# Patient Record
Sex: MALE | Race: WHITE | NOT HISPANIC OR LATINO | Employment: FULL TIME | URBAN - METROPOLITAN AREA
[De-identification: names, ages, dates, MRNs, and addresses within clinical notes are randomized per-mention and may not be internally consistent; named-entity substitution may affect disease eponyms.]

---

## 2017-07-26 RX ORDER — MESALAMINE 375 MG/1
CAPSULE, EXTENDED RELEASE ORAL
Qty: 120 CAPSULE | Refills: 10 | OUTPATIENT
Start: 2017-07-26

## 2017-08-16 ENCOUNTER — OFFICE VISIT (OUTPATIENT)
Dept: GASTROENTEROLOGY | Facility: CLINIC | Age: 42
End: 2017-08-16

## 2017-08-16 VITALS
BODY MASS INDEX: 33.18 KG/M2 | SYSTOLIC BLOOD PRESSURE: 130 MMHG | HEIGHT: 70 IN | WEIGHT: 231.8 LBS | DIASTOLIC BLOOD PRESSURE: 76 MMHG

## 2017-08-16 DIAGNOSIS — K51.90 ULCERATIVE COLITIS WITHOUT COMPLICATIONS, UNSPECIFIED LOCATION (HCC): Primary | ICD-10-CM

## 2017-08-16 PROCEDURE — 99214 OFFICE O/P EST MOD 30 MIN: CPT | Performed by: INTERNAL MEDICINE

## 2017-08-16 RX ORDER — MESALAMINE 0.38 G/1
1.5 CAPSULE, EXTENDED RELEASE ORAL DAILY
Qty: 120 CAPSULE | Refills: 11 | Status: ON HOLD | OUTPATIENT
Start: 2017-08-16 | End: 2017-10-11

## 2017-08-16 NOTE — PROGRESS NOTES
PATIENT INFORMATION  Arun SINGH [place]       - 1975    CHIEF COMPLAINT  Chief Complaint   Patient presents with   • Ulcerative Colitis       HISTORY OF PRESENT ILLNESS  Ulcerative Colitis     43 yo diagnosed with UC for 13 years maintained on Apriso 2 daily.  BM are 2-3 times daily. No blood in the stool. Weight has been stable. He has been off Apriso for 3 weeks because his previous GI office had ?troubles.  Last CLS was in . He was scheduled for a cls last year but did not get bowel prep.      REVIEW OF SYSTEMS  Review of Systems   All other systems reviewed and are negative.        ACTIVE PROBLEMS  There are no active problems to display for this patient.        PAST MEDICAL HISTORY  Past Medical History:   Diagnosis Date   • Esophagitis    • Gastritis    • GERD (gastroesophageal reflux disease)    • Hiatal hernia    • Hypertension    • Ulcerative colitis          SURGICAL HISTORY  Past Surgical History:   Procedure Laterality Date   • COLONOSCOPY  2013    stool, mild inflammation, mild-moderate chronic colitis w/crypt abscess    • UPPER GASTROINTESTINAL ENDOSCOPY  2013    z line irregular, 38 cm from incisors, HH, gastritis, active esophagitis w/changes suggestive of an adjacent erosion, no h-pylorino Curran's         FAMILY HISTORY  History reviewed. No pertinent family history.      SOCIAL HISTORY  Social History     Occupational History   • Not on file.     Social History Main Topics   • Smoking status: Never Smoker   • Smokeless tobacco: Not on file   • Alcohol use 3.6 oz/week     6 Cans of beer per week      Comment: weekly   • Drug use: No   • Sexual activity: Not on file         CURRENT MEDICATIONS    Current Outpatient Prescriptions:   •  amLODIPine (NORVASC) 5 MG tablet, Take 5 mg by mouth daily., Disp: , Rfl:   •  hydrocortisone (ANUSOL-HC) 2.5 % rectal cream, Insert  into the rectum 2 (two) times a day., Disp: 30 g, Rfl: 5  •  mesalamine (APRISO) 0.375 g 24 hr capsule, Take  "4 capsules by mouth Daily for 120 days., Disp: 120 capsule, Rfl: 11  •  omeprazole (PriLOSEC) 40 MG capsule, Take 40 mg by mouth daily., Disp: , Rfl:     ALLERGIES  Asacol [mesalamine]    VITALS  Vitals:    08/16/17 1518   BP: 130/76   Weight: 231 lb 12.8 oz (105 kg)   Height: 70\" (177.8 cm)       LAST RESULTS   Admission on 07/27/2015, Discharged on 07/27/2015   Component Date Value Ref Range Status   • WBC 07/27/2015 9.99  4.80 - 10.80 K/Cumm Final   • RBC 07/27/2015 5.45  4.70 - 6.10 Million Final   • Hemoglobin 07/27/2015 16.1  14.0 - 18.0 g/dL Final   • Hematocrit 07/27/2015 45.6  42.0 - 52.0 % Final   • MCV 07/27/2015 83.7  80.0 - 94.0 fL Final   • MCH 07/27/2015 29.5  27.0 - 31.0 pg Final   • MCHC 07/27/2015 35.3  31.0 - 37.0 g/dL Final   • RDW-CV 07/27/2015 12.3  11.5 - 14.5 % Final   • Platelets 07/27/2015 283  140 - 500 K/Cumm Final   • MPV 07/27/2015 10.5* 7.4 - 10.4 fL Final   • Neutrophil Rel % 07/27/2015 68  45 - 70 % Final   • Lymphocyte Rel % 07/27/2015 17* 20 - 45 % Final   • Monocyte Rel % 07/27/2015 14* 3 - 8 % Final   • Eosinophil Rel % 07/27/2015 1  0 - 4 % Final   • Basophil Rel % 07/27/2015 1  0 - 2 % Final   • Neutrophils Absolute 07/27/2015 6.77  1.50 - 8.30 K/Cumm Final   • Lymphocytes Absolute 07/27/2015 1.67  0.60 - 4.80 K/Cumm Final   • Monocytes Absolute 07/27/2015 1.44* 0.00 - 1.00 K/Cumm Final   • Eosinophils Absolute 07/27/2015 0.05* 0.10 - 0.30 K/Cumm Final   • Basophils Absolute 07/27/2015 0.06  0.00 - 0.20 K/Cumm Final   • Glucose 07/27/2015 102* 65 - 99 mg/dL Final   • BUN 07/27/2015 15  6 - 20 mg/dL Final   • Creatinine 07/27/2015 1.40* 0.76 - 1.27 mg/dL Final   • Sodium 07/27/2015 133* 136 - 145 mmol/L Final   • Potassium 07/27/2015 3.5  3.5 - 5.2 mmol/L Final   • Chloride 07/27/2015 96* 98 - 107 mmol/L Final   • CO2 07/27/2015 18* 22 - 29 mmol/L Final   • Total Protein 07/27/2015 8.9* 6.0 - 8.5 g/dL Final   • Albumin 07/27/2015 4.8  3.5 - 5.2 g/dL Final   • AST (SGOT) " 07/27/2015 28  5 - 40 U/L Final   • Calcium 07/27/2015 8.9  8.6 - 10.5 mg/dL Final   • Total Bilirubin 07/27/2015 0.5  0.1 - 1.2 mg/dL Final   • Alkaline Phosphatase 07/27/2015 93  40 - 129 U/L Final   • ALT (SGPT) 07/27/2015 34  5 - 41 U/L Final   • eGFR 07/27/2015 56  ml/min/1.732 Final    Comment: DF by IF @ 07/27/2015 15:30  GFR Normal                            >60  Chronic Kidney Disease          <60  Kidney Failure                         <15     • Lipase 07/27/2015 17.00  13.00 - 60.00 Units/L Final   • Amylase 07/27/2015 27* 28 - 100 Units/L Final   • Color, UA 07/27/2015 Yellow  YELLOW Final   • Appearance, UA 07/27/2015 Sl Hazy* CLEAR Final   • Glucose, UA 07/27/2015 Negative  NEGATIVE mg/dL Final   • Bilirubin, UA 07/27/2015 Negative  NEGATIVE Final   • Ketones, UA 07/27/2015 Negative  NEGATIVE mg/dL Final   • Specific Gravity, UA 07/27/2015 1.025  1.003 - 1.030 Final   • pH, UA 07/27/2015 6.0  4.5 - 8.0 Final   • Protein, UA 07/27/2015 30* NEGATIVE,TRACE mg/dL Final   • Urobilinogen, UA 07/27/2015 0.2  0.2 - 1.0 E.U./dL Final   • Nitrite, UA 07/27/2015 Negative  NEGATIVE Final   • Blood, UA 07/27/2015 Small* NEGATIVE Final   • Leukocytes, UA 07/27/2015 Negative  NEGATIVE Final   • WBC, UA 07/27/2015 13-20* NONE SEEN,0-2,3-6 /hpf Final   • RBC, UA 07/27/2015 7-12* NONE SEEN,0-2,3-6 /hpf Final   • Epithelial Cells, UA 07/27/2015 0-2  NONE SEEN,0-2,3-6 /hpf Final   • Bacteria, UA 07/27/2015 1+* NONE SEEN /hpf Final   • Mucus, UA 07/27/2015 Moderate/2+* NONE SEEN /hpf Final   • Casts 07/27/2015 3-6   Final     No results found.    PHYSICAL EXAM  Physical Exam   Constitutional: He is oriented to person, place, and time. He appears well-developed and well-nourished. No distress.   HENT:   Head: Normocephalic and atraumatic.   Eyes: EOM are normal. Pupils are equal, round, and reactive to light.   Neck: Neck supple. No tracheal deviation present.   Cardiovascular: Normal rate, regular rhythm, normal heart  sounds and intact distal pulses.  Exam reveals no gallop and no friction rub.    No murmur heard.  Pulmonary/Chest: Effort normal and breath sounds normal. No respiratory distress. He has no wheezes. He has no rales. He exhibits no tenderness.   Abdominal: Soft. Bowel sounds are normal. He exhibits no distension. There is no tenderness. There is no rebound and no guarding.   Musculoskeletal: He exhibits no edema.   Lymphadenopathy:     He has no cervical adenopathy.   Neurological: He is alert and oriented to person, place, and time.   Skin: Skin is warm. He is not diaphoretic. No erythema.   Psychiatric: He has a normal mood and affect. His behavior is normal. Judgment and thought content normal.   Nursing note and vitals reviewed.      ASSESSMENT  Diagnoses and all orders for this visit:    Ulcerative colitis without complications, unspecified location  -     Case Request; Standing  -     Case Request    Other orders  -     Implement Anesthesia orders day of procedure.; Standing  -     Obtain informed consent; Standing  -     mesalamine (APRISO) 0.375 g 24 hr capsule; Take 4 capsules by mouth Daily for 120 days.          PLAN  No Follow-up on file.      Risks, benefits and alternatives discussed including but not limited to the complications of bleeding, perforation and sedation related problems.      Needs routine 1-2 year cls, discussed this with him. Risk of CRC discussed.

## 2017-10-10 ENCOUNTER — ANESTHESIA EVENT (OUTPATIENT)
Dept: PERIOP | Facility: HOSPITAL | Age: 42
End: 2017-10-10

## 2017-10-11 ENCOUNTER — ANESTHESIA (OUTPATIENT)
Dept: PERIOP | Facility: HOSPITAL | Age: 42
End: 2017-10-11

## 2017-10-11 ENCOUNTER — HOSPITAL ENCOUNTER (OUTPATIENT)
Facility: HOSPITAL | Age: 42
Setting detail: HOSPITAL OUTPATIENT SURGERY
Discharge: HOME | End: 2017-10-11
Attending: INTERNAL MEDICINE | Admitting: INTERNAL MEDICINE

## 2017-10-11 VITALS
HEART RATE: 60 BPM | DIASTOLIC BLOOD PRESSURE: 82 MMHG | WEIGHT: 222.4 LBS | RESPIRATION RATE: 14 BRPM | BODY MASS INDEX: 31.91 KG/M2 | OXYGEN SATURATION: 99 % | TEMPERATURE: 98.8 F | SYSTOLIC BLOOD PRESSURE: 120 MMHG

## 2017-10-11 DIAGNOSIS — K51.90 ULCERATIVE COLITIS WITHOUT COMPLICATIONS, UNSPECIFIED LOCATION (HCC): ICD-10-CM

## 2017-10-11 PROCEDURE — 25010000002 PROPOFOL 10 MG/ML EMULSION: Performed by: NURSE ANESTHETIST, CERTIFIED REGISTERED

## 2017-10-11 PROCEDURE — 45380 COLONOSCOPY AND BIOPSY: CPT | Performed by: INTERNAL MEDICINE

## 2017-10-11 RX ORDER — PROPOFOL 10 MG/ML
VIAL (ML) INTRAVENOUS AS NEEDED
Status: DISCONTINUED | OUTPATIENT
Start: 2017-10-11 | End: 2017-10-11 | Stop reason: SURG

## 2017-10-11 RX ORDER — LIDOCAINE HYDROCHLORIDE 20 MG/ML
INJECTION, SOLUTION INFILTRATION; PERINEURAL AS NEEDED
Status: DISCONTINUED | OUTPATIENT
Start: 2017-10-11 | End: 2017-10-11 | Stop reason: SURG

## 2017-10-11 RX ORDER — SODIUM CHLORIDE 9 MG/ML
40 INJECTION, SOLUTION INTRAVENOUS AS NEEDED
Status: DISCONTINUED | OUTPATIENT
Start: 2017-10-11 | End: 2017-10-11 | Stop reason: HOSPADM

## 2017-10-11 RX ORDER — MESALAMINE 0.38 G/1
375 CAPSULE, EXTENDED RELEASE ORAL 4 TIMES DAILY
Qty: 120 CAPSULE | Refills: 6 | Status: SHIPPED | OUTPATIENT
Start: 2017-10-11 | End: 2018-10-04 | Stop reason: SDUPTHER

## 2017-10-11 RX ORDER — SODIUM CHLORIDE 0.9 % (FLUSH) 0.9 %
1-10 SYRINGE (ML) INJECTION AS NEEDED
Status: DISCONTINUED | OUTPATIENT
Start: 2017-10-11 | End: 2017-10-11 | Stop reason: HOSPADM

## 2017-10-11 RX ORDER — LIDOCAINE HYDROCHLORIDE 10 MG/ML
INJECTION, SOLUTION EPIDURAL; INFILTRATION; INTRACAUDAL; PERINEURAL
Status: COMPLETED
Start: 2017-10-11 | End: 2017-10-11

## 2017-10-11 RX ORDER — SODIUM CHLORIDE, SODIUM LACTATE, POTASSIUM CHLORIDE, CALCIUM CHLORIDE 600; 310; 30; 20 MG/100ML; MG/100ML; MG/100ML; MG/100ML
9 INJECTION, SOLUTION INTRAVENOUS CONTINUOUS
Status: DISCONTINUED | OUTPATIENT
Start: 2017-10-11 | End: 2017-10-11 | Stop reason: HOSPADM

## 2017-10-11 RX ORDER — LIDOCAINE HYDROCHLORIDE 10 MG/ML
0.5 INJECTION, SOLUTION EPIDURAL; INFILTRATION; INTRACAUDAL; PERINEURAL ONCE AS NEEDED
Status: COMPLETED | OUTPATIENT
Start: 2017-10-11 | End: 2017-10-11

## 2017-10-11 RX ORDER — MAGNESIUM HYDROXIDE 1200 MG/15ML
LIQUID ORAL AS NEEDED
Status: DISCONTINUED | OUTPATIENT
Start: 2017-10-11 | End: 2017-10-11 | Stop reason: HOSPADM

## 2017-10-11 RX ADMIN — PROPOFOL 50 MG: 10 INJECTION, EMULSION INTRAVENOUS at 10:56

## 2017-10-11 RX ADMIN — PROPOFOL 100 MG: 10 INJECTION, EMULSION INTRAVENOUS at 10:33

## 2017-10-11 RX ADMIN — LIDOCAINE HYDROCHLORIDE 100 MG: 20 INJECTION, SOLUTION INFILTRATION; PERINEURAL at 10:30

## 2017-10-11 RX ADMIN — PROPOFOL 50 MG: 10 INJECTION, EMULSION INTRAVENOUS at 10:41

## 2017-10-11 RX ADMIN — PROPOFOL 50 MG: 10 INJECTION, EMULSION INTRAVENOUS at 11:04

## 2017-10-11 RX ADMIN — PROPOFOL 50 MG: 10 INJECTION, EMULSION INTRAVENOUS at 10:35

## 2017-10-11 RX ADMIN — PROPOFOL 100 MG: 10 INJECTION, EMULSION INTRAVENOUS at 10:37

## 2017-10-11 RX ADMIN — PROPOFOL 50 MG: 10 INJECTION, EMULSION INTRAVENOUS at 10:53

## 2017-10-11 RX ADMIN — PROPOFOL 50 MG: 10 INJECTION, EMULSION INTRAVENOUS at 10:59

## 2017-10-11 RX ADMIN — LIDOCAINE HYDROCHLORIDE 0.1 ML: 10 INJECTION, SOLUTION EPIDURAL; INFILTRATION; INTRACAUDAL; PERINEURAL at 10:05

## 2017-10-11 RX ADMIN — SODIUM CHLORIDE, POTASSIUM CHLORIDE, SODIUM LACTATE AND CALCIUM CHLORIDE 9 ML/HR: 600; 310; 30; 20 INJECTION, SOLUTION INTRAVENOUS at 10:05

## 2017-10-11 RX ADMIN — PROPOFOL 50 MG: 10 INJECTION, EMULSION INTRAVENOUS at 10:45

## 2017-10-11 RX ADMIN — PROPOFOL 50 MG: 10 INJECTION, EMULSION INTRAVENOUS at 10:49

## 2017-10-11 NOTE — ANESTHESIA PREPROCEDURE EVALUATION
Anesthesia Evaluation     Patient summary reviewed   NPO Solid Status: > 8 hours  NPO Liquid Status: > 4 hours     Airway   Mallampati: II  TM distance: >3 FB  Neck ROM: full  no difficulty expected  Dental - normal exam     Pulmonary - normal exam    breath sounds clear to auscultation  (+) a smoker (occas cigar),   Cardiovascular - normal exam  Exercise tolerance: good (4-7 METS)    Rhythm: regular  Rate: normal    (+) hypertension well controlled,       Neuro/Psych- negative ROS  GI/Hepatic/Renal/Endo    (+)  GERD well controlled,     ROS Comment: H/o ulcerative colitis      Musculoskeletal (-) negative ROS    Abdominal  - normal exam   Substance History - negative use     OB/GYN negative ob/gyn ROS         Other - negative ROS                                     Anesthesia Plan    ASA 2     MAC     intravenous induction   Anesthetic plan and risks discussed with patient.

## 2017-10-11 NOTE — OP NOTE
Colonoscopy Note:    Indication: Long-standing history for ulcerative colitis, surveillance colonoscopy      Consent: Procedure colonoscopy was explained to the patient and detail including but not limited to the, patient of bleeding perforation and possible reactions to sedation.    Sedation: Sedation was provided by anesthesia.    Procedure:  After excellent sedation a digital rectal examinations performed and a flexible colonoscope was inserted into the rectum passed to the cecum.  The cecum was identified by both the ileocecal valve and the appendiceal orifice.  The overall bowel preparation was fair.  Terminal ileum was entered this appeared normal.  Upon withdrawal scope careful examination mucosa was made.  Terminal ileum overall appeared normal.  Random biopsies of the terminal ileum right colon transverse colon left colon and rectum were all obtained.  2 polyps were noted both were about 2-3 mm each sessile removed with forceps.  One was in the sigmoid colon and the other one was in the descending colon.  Both removed with forceps.  There is evidence of mild diffuse colitis extending from the rectum to about 45 cm.  The mucosa proximal to this grossly appeared normal.  Scope was withdrawn to the rectum and retroflex were internal hemorrhoids are noted.  Scope was then straightened and withdrawn out of the patient with no immediate complications and he tolerated the procedure well.    Impression/Plan:  Colitis mild extending up to about 45 cm.  We'll await his biopsy results.  Will increase his pre-so to 4 tablets daily.  Colon polyp removed with forceps  Repeat colon likely in 2 years but based on pathology results.

## 2017-10-11 NOTE — ANESTHESIA POSTPROCEDURE EVALUATION
Patient: Arun Painting    Procedure Summary     Date Anesthesia Start Anesthesia Stop Room / Location    10/11/17 1029 1109 [facility] / [facility]       Procedure Diagnosis Surgeon Provider    COLONOSCOPY  w/ biopsies; polypectomy (N/A ) Ulcerative colitis without complications, unspecified location  (Ulcerative colitis without complications, unspecified location [K51.90]) MD Cherrie Meyer CRNA          Anesthesia Type: MAC  Last vitals  BP   109/81 (10/11/17 1120)    Temp        Pulse   60 (10/11/17 1120)   Resp   16 (10/11/17 1120)    SpO2   99 % (10/11/17 1120)      Post Anesthesia Care and Evaluation    Patient location during evaluation: bedside  Patient participation: complete - patient participated  Level of consciousness: awake and alert  Pain score: 0  Pain management: adequate  Airway patency: patent  Anesthetic complications: No anesthetic complications    Cardiovascular status: acceptable  Respiratory status: acceptable  Hydration status: acceptable

## 2017-10-11 NOTE — PLAN OF CARE
Problem: Patient Care Overview (Adult)  Goal: Plan of Care Review  Outcome: Ongoing (interventions implemented as appropriate)    10/11/17 1015   Coping/Psychosocial Response Interventions   Plan Of Care Reviewed With patient;spouse   Patient Care Overview   Progress no change   Outcome Evaluation   Outcome Summary/Follow up Plan VSS, NO C/O, AWAITING ANESTHESIA THEN READY FOR PROCEDURE       Goal: Adult Individualization and Mutuality  Outcome: Ongoing (interventions implemented as appropriate)    Problem: GI Endoscopy (Adult)  Goal: Signs and Symptoms of Listed Potential Problems Will be Absent or Manageable (GI Endoscopy)  Outcome: Ongoing (interventions implemented as appropriate)

## 2017-10-11 NOTE — PLAN OF CARE
Problem: GI Endoscopy (Adult)  Goal: Signs and Symptoms of Listed Potential Problems Will be Absent or Manageable (GI Endoscopy)  Outcome: Ongoing (interventions implemented as appropriate)    10/11/17 1033   GI Endoscopy   Problems Assessed (GI Endoscopy) all   Problems Present (GI Endoscopy) none

## 2017-10-11 NOTE — H&P
PATIENT INFORMATION  Arun SINGH [place]         - 1975     CHIEF COMPLAINT      Chief Complaint   Patient presents with   • Ulcerative Colitis         HISTORY OF PRESENT ILLNESS  Ulcerative Colitis   43 yo diagnosed with UC for 13 years maintained on Apriso 2 daily.  BM are 2-3 times daily. No blood in the stool. Weight has been stable. He has been off Apriso for 3 weeks because his previous GI office had ?troubles.  Last CLS was in . He was scheduled for a cls last year but did not get bowel prep.        REVIEW OF SYSTEMS  Review of Systems   All other systems reviewed and are negative.           ACTIVE PROBLEMS  There are no active problems to display for this patient.           PAST MEDICAL HISTORY   Medical History         Past Medical History:   Diagnosis Date   • Esophagitis     • Gastritis     • GERD (gastroesophageal reflux disease)     • Hiatal hernia     • Hypertension     • Ulcerative colitis                 SURGICAL HISTORY   Surgical History          Past Surgical History:   Procedure Laterality Date   • COLONOSCOPY   2013     stool, mild inflammation, mild-moderate chronic colitis w/crypt abscess    • UPPER GASTROINTESTINAL ENDOSCOPY   2013     z line irregular, 38 cm from incisors, HH, gastritis, active esophagitis w/changes suggestive of an adjacent erosion, no h-pylorino Curran's               FAMILY HISTORY  History reviewed. No pertinent family history.        SOCIAL HISTORY  Social History          Occupational History   • Not on file.              Social History Main Topics    • Smoking status: Never Smoker    • Smokeless tobacco: Not on file    • Alcohol use 3.6 oz/week       6 Cans of beer per week         Comment: weekly    • Drug use: No    • Sexual activity: Not on file             CURRENT MEDICATIONS     Current Outpatient Prescriptions:   •  amLODIPine (NORVASC) 5 MG tablet, Take 5 mg by mouth daily., Disp: , Rfl:   •  hydrocortisone (ANUSOL-HC) 2.5 % rectal cream,  "Insert  into the rectum 2 (two) times a day., Disp: 30 g, Rfl: 5  •  mesalamine (APRISO) 0.375 g 24 hr capsule, Take 4 capsules by mouth Daily for 120 days., Disp: 120 capsule, Rfl: 11  •  omeprazole (PriLOSEC) 40 MG capsule, Take 40 mg by mouth daily., Disp: , Rfl:      ALLERGIES  Asacol [mesalamine]     VITALS   Vitals        Vitals:     08/16/17 1518   BP: 130/76   Weight: 231 lb 12.8 oz (105 kg)   Height: 70\" (177.8 cm)            LAST RESULTS                             Admission on 07/27/2015, Discharged on 07/27/2015   Component Date Value Ref Range Status   • WBC 07/27/2015 9.99  4.80 - 10.80 K/Cumm Final   • RBC 07/27/2015 5.45  4.70 - 6.10 Million Final   • Hemoglobin 07/27/2015 16.1  14.0 - 18.0 g/dL Final   • Hematocrit 07/27/2015 45.6  42.0 - 52.0 % Final   • MCV 07/27/2015 83.7  80.0 - 94.0 fL Final   • MCH 07/27/2015 29.5  27.0 - 31.0 pg Final   • MCHC 07/27/2015 35.3  31.0 - 37.0 g/dL Final   • RDW-CV 07/27/2015 12.3  11.5 - 14.5 % Final   • Platelets 07/27/2015 283  140 - 500 K/Cumm Final   • MPV 07/27/2015 10.5* 7.4 - 10.4 fL Final   • Neutrophil Rel % 07/27/2015 68  45 - 70 % Final   • Lymphocyte Rel % 07/27/2015 17* 20 - 45 % Final   • Monocyte Rel % 07/27/2015 14* 3 - 8 % Final   • Eosinophil Rel % 07/27/2015 1  0 - 4 % Final   • Basophil Rel % 07/27/2015 1  0 - 2 % Final   • Neutrophils Absolute 07/27/2015 6.77  1.50 - 8.30 K/Cumm Final   • Lymphocytes Absolute 07/27/2015 1.67  0.60 - 4.80 K/Cumm Final   • Monocytes Absolute 07/27/2015 1.44* 0.00 - 1.00 K/Cumm Final   • Eosinophils Absolute 07/27/2015 0.05* 0.10 - 0.30 K/Cumm Final   • Basophils Absolute 07/27/2015 0.06  0.00 - 0.20 K/Cumm Final   • Glucose 07/27/2015 102* 65 - 99 mg/dL Final   • BUN 07/27/2015 15  6 - 20 mg/dL Final   • Creatinine 07/27/2015 1.40* 0.76 - 1.27 mg/dL Final   • Sodium 07/27/2015 133* 136 - 145 mmol/L Final   • Potassium 07/27/2015 3.5  3.5 - 5.2 mmol/L Final   • Chloride 07/27/2015 96* 98 - 107 mmol/L Final   • " CO2 07/27/2015 18* 22 - 29 mmol/L Final   • Total Protein 07/27/2015 8.9* 6.0 - 8.5 g/dL Final   • Albumin 07/27/2015 4.8  3.5 - 5.2 g/dL Final   • AST (SGOT) 07/27/2015 28  5 - 40 U/L Final   • Calcium 07/27/2015 8.9  8.6 - 10.5 mg/dL Final   • Total Bilirubin 07/27/2015 0.5  0.1 - 1.2 mg/dL Final   • Alkaline Phosphatase 07/27/2015 93  40 - 129 U/L Final   • ALT (SGPT) 07/27/2015 34  5 - 41 U/L Final   • eGFR 07/27/2015 56  ml/min/1.732 Final     Comment: DF by IF @ 07/27/2015 15:30  GFR Normal                            >60  Chronic Kidney Disease          <60  Kidney Failure                         <15   • Lipase 07/27/2015 17.00  13.00 - 60.00 Units/L Final   • Amylase 07/27/2015 27* 28 - 100 Units/L Final   • Color, UA 07/27/2015 Yellow  YELLOW Final   • Appearance, UA 07/27/2015 Sl Hazy* CLEAR Final   • Glucose, UA 07/27/2015 Negative  NEGATIVE mg/dL Final   • Bilirubin, UA 07/27/2015 Negative  NEGATIVE Final   • Ketones, UA 07/27/2015 Negative  NEGATIVE mg/dL Final   • Specific Gravity, UA 07/27/2015 1.025  1.003 - 1.030 Final   • pH, UA 07/27/2015 6.0  4.5 - 8.0 Final   • Protein, UA 07/27/2015 30* NEGATIVE,TRACE mg/dL Final   • Urobilinogen, UA 07/27/2015 0.2  0.2 - 1.0 E.U./dL Final   • Nitrite, UA 07/27/2015 Negative  NEGATIVE Final   • Blood, UA 07/27/2015 Small* NEGATIVE Final   • Leukocytes, UA 07/27/2015 Negative  NEGATIVE Final   • WBC, UA 07/27/2015 13-20* NONE SEEN,0-2,3-6 /hpf Final   • RBC, UA 07/27/2015 7-12* NONE SEEN,0-2,3-6 /hpf Final   • Epithelial Cells, UA 07/27/2015 0-2  NONE SEEN,0-2,3-6 /hpf Final   • Bacteria, UA 07/27/2015 1+* NONE SEEN /hpf Final   • Mucus, UA 07/27/2015 Moderate/2+* NONE SEEN /hpf Final   • Casts 07/27/2015 3-6    Final      No results found.     PHYSICAL EXAM  Physical Exam   Constitutional: He is oriented to person, place, and time. He appears well-developed and well-nourished. No distress.   HENT:   Head: Normocephalic and atraumatic.   Eyes: EOM are normal.  Pupils are equal, round, and reactive to light.   Neck: Neck supple. No tracheal deviation present.   Cardiovascular: Normal rate, regular rhythm, normal heart sounds and intact distal pulses.  Exam reveals no gallop and no friction rub.    No murmur heard.  Pulmonary/Chest: Effort normal and breath sounds normal. No respiratory distress. He has no wheezes. He has no rales. He exhibits no tenderness.   Abdominal: Soft. Bowel sounds are normal. He exhibits no distension. There is no tenderness. There is no rebound and no guarding.   Musculoskeletal: He exhibits no edema.   Lymphadenopathy:     He has no cervical adenopathy.   Neurological: He is alert and oriented to person, place, and time.   Skin: Skin is warm. He is not diaphoretic. No erythema.   Psychiatric: He has a normal mood and affect. His behavior is normal. Judgment and thought content normal.   Nursing note and vitals reviewed.        ASSESSMENT  Diagnoses and all orders for this visit:     Ulcerative colitis without complications, unspecified location  -     Case Request; Standing  -     Case Request     Other orders  -     Implement Anesthesia orders day of procedure.; Standing  -     Obtain informed consent; Standing  -     mesalamine (APRISO) 0.375 g 24 hr capsule; Take 4 capsules by mouth Daily for 120 days.              PLAN  No Follow-up on file.        Risks, benefits and alternatives discussed including but not limited to the complications of bleeding, perforation and sedation related problems.        Needs routine 1-2 year cls, discussed this with him. Risk of CRC discussed.

## 2017-10-12 LAB
LAB AP CASE REPORT: NORMAL
Lab: NORMAL
PATH REPORT.FINAL DX SPEC: NORMAL

## 2018-10-08 RX ORDER — MESALAMINE 375 MG/1
CAPSULE, EXTENDED RELEASE ORAL
Qty: 120 CAPSULE | Refills: 5 | Status: SHIPPED | OUTPATIENT
Start: 2018-10-08 | End: 2019-11-08 | Stop reason: SDUPTHER

## 2018-11-14 ENCOUNTER — OFFICE VISIT (OUTPATIENT)
Dept: GASTROENTEROLOGY | Facility: CLINIC | Age: 43
End: 2018-11-14

## 2018-11-14 VITALS
BODY MASS INDEX: 34.13 KG/M2 | SYSTOLIC BLOOD PRESSURE: 128 MMHG | WEIGHT: 238.4 LBS | DIASTOLIC BLOOD PRESSURE: 76 MMHG | HEIGHT: 70 IN

## 2018-11-14 DIAGNOSIS — K51.90 ULCERATIVE COLITIS WITHOUT COMPLICATIONS, UNSPECIFIED LOCATION (HCC): Primary | ICD-10-CM

## 2018-11-14 DIAGNOSIS — R10.13 EPIGASTRIC PAIN: ICD-10-CM

## 2018-11-14 DIAGNOSIS — K21.9 GASTROESOPHAGEAL REFLUX DISEASE, ESOPHAGITIS PRESENCE NOT SPECIFIED: ICD-10-CM

## 2018-11-14 PROCEDURE — 99214 OFFICE O/P EST MOD 30 MIN: CPT | Performed by: INTERNAL MEDICINE

## 2018-11-14 RX ORDER — OMEPRAZOLE 20 MG/1
40 CAPSULE, DELAYED RELEASE ORAL DAILY
Qty: 60 CAPSULE | Refills: 3 | Status: SHIPPED | OUTPATIENT
Start: 2018-11-14 | End: 2019-04-05 | Stop reason: SDUPTHER

## 2018-11-14 NOTE — PROGRESS NOTES
PATIENT INFORMATION  Arun SINGH Cheshire       - 1975    CHIEF COMPLAINT  Chief Complaint   Patient presents with   • Ulcerative Colitis     yearly follow up       HISTORY OF PRESENT ILLNESS  HPI    He is here with 4 months of intermittent epigastric abdominal, sharp, usually after eating about 30 minutes, daily/constant ache also present. No odynophagia. No dysphagia. No melena or hematochezia.  He had previous episodes of pancreatitis related to a medication, ?imuran.  He had cls last year with inflammation from rectum up to 45cm. He was advised to increase dose to 4 daily but he decreased dose to once daily. He has 4-6 bm daily. No blood. He felt taking 4 daily caused sensation of incomplete evacuation.  He has gerd but has been off omeprazole for at least 2 years. He otc zantac as needed.  REVIEW OF SYSTEMS  Review of Systems   Respiratory: Positive for chest tightness and shortness of breath.    Gastrointestinal: Positive for constipation.   All other systems reviewed and are negative.        ACTIVE PROBLEMS  Patient Active Problem List    Diagnosis   • Ulcerative colitis without complications (CMS/HCC) [K51.90]         PAST MEDICAL HISTORY  Past Medical History:   Diagnosis Date   • Esophagitis    • Gastritis    • GERD (gastroesophageal reflux disease)    • Hiatal hernia    • Hypertension    • Ulcerative colitis (CMS/HCC)          SURGICAL HISTORY  Past Surgical History:   Procedure Laterality Date   • COLONOSCOPY  2013    stool, mild inflammation, mild-moderate chronic colitis w/crypt abscess    • UPPER GASTROINTESTINAL ENDOSCOPY  2013    z line irregular, 38 cm from incisors, HH, gastritis, active esophagitis w/changes suggestive of an adjacent erosion, no h-pylorino Curran's         FAMILY HISTORY  Family History   Problem Relation Age of Onset   • Colon polyps Mother    • Colon cancer Neg Hx          SOCIAL HISTORY  Social History     Occupational History   • Not on file   Tobacco Use  "  • Smoking status: Never Smoker   • Smokeless tobacco: Never Used   Substance and Sexual Activity   • Alcohol use: Yes     Alcohol/week: 3.6 oz     Types: 6 Cans of beer per week     Comment: weekly   • Drug use: No   • Sexual activity: Defer       Debilities/Disabilities Identified: None    Emotional Behavior: Appropriate    CURRENT MEDICATIONS    Current Outpatient Medications:   •  amLODIPine (NORVASC) 5 MG tablet, Take 5 mg by mouth daily., Disp: , Rfl:   •  APRISO 0.375 g 24 hr capsule, TAKE ONE CAPSULE BY MOUTH FOUR TIMES A DAY, Disp: 120 capsule, Rfl: 5  •  hydrocortisone (ANUSOL-HC) 2.5 % rectal cream, Insert  into the rectum 2 (two) times a day., Disp: 30 g, Rfl: 5  •  omeprazole (priLOSEC) 20 MG capsule, Take 2 capsules by mouth Daily., Disp: 60 capsule, Rfl: 3    ALLERGIES  Asacol [mesalamine]    VITALS  Vitals:    11/14/18 1319   BP: 128/76   Weight: 108 kg (238 lb 6.4 oz)   Height: 177.8 cm (70\")       LAST RESULTS   Admission on 10/11/2017, Discharged on 10/11/2017   Component Date Value Ref Range Status   • Case Report 10/11/2017    Final                    Value:Surgical Pathology Report                         Case: [id number]                                  Authorizing Provider:  Arlyn Cam MD          Collected:           10/11/2017 10:43 AM          Ordering Location:     [facility]   Received:            10/11/2017 11:22 AM                                 OR                                                                           Pathologist:           Jamey Neri MD                                                          Specimens:   1) - Small Intestine, Ileum, terminal ileum biopsy                                                  2) - Large Intestine, Right / Ascending Colon, ascending colon biopsy                               3) - Large Intestine, Transverse Colon, transverse biopsy                                           4) - Large Intestine, Left / Descending " Colon, descending polyp                                     5) - Large Intestine, Left / Descending Colon, descending colon biopsy                                                        6) - Large Intestine, Sigmoid Colon, sigmoid polyp                                                  7) - Large Intestine, Rectum, rectal biopsy                                               • Final Diagnosis 10/11/2017    Final                    Value:This result contains rich text formatting which cannot be displayed here.     No results found.    PHYSICAL EXAM  Physical Exam   Constitutional: He is oriented to person, place, and time. He appears well-developed and well-nourished. No distress.   HENT:   Head: Normocephalic and atraumatic.   Eyes: EOM are normal. Pupils are equal, round, and reactive to light.   Neck: Neck supple. No tracheal deviation present.   Cardiovascular: Normal rate, regular rhythm, normal heart sounds and intact distal pulses. Exam reveals no gallop and no friction rub.   No murmur heard.  Pulmonary/Chest: Effort normal and breath sounds normal. No respiratory distress. He has no wheezes. He has no rales. He exhibits no tenderness.   Abdominal: Soft. Bowel sounds are normal. He exhibits no distension. There is tenderness. There is no rebound and no guarding.   Epigastric pain   Musculoskeletal: He exhibits no edema.   Lymphadenopathy:     He has no cervical adenopathy.   Neurological: He is alert and oriented to person, place, and time.   Skin: Skin is warm. He is not diaphoretic. No erythema.   Psychiatric: He has a normal mood and affect. His behavior is normal. Judgment and thought content normal.   Nursing note and vitals reviewed.      ASSESSMENT  Diagnoses and all orders for this visit:    Ulcerative colitis without complications, unspecified location (CMS/HCC)  -     Hepatic Function Panel  -     Lipase  -     Basic Metabolic Panel    Gastroesophageal reflux disease, esophagitis presence not  specified  -     Hepatic Function Panel  -     Lipase  -     Basic Metabolic Panel    Epigastric pain  -     Hepatic Function Panel  -     Lipase  -     Basic Metabolic Panel  -     US Gallbladder; Future    Other orders  -     omeprazole (priLOSEC) 20 MG capsule; Take 2 capsules by mouth Daily.          PLAN  No Follow-up on file.    Advised to restart ppi  Antireflux measures and dietary modifications reviewed. Low acid diet reviewed. Keep head of bed elevated. Stop eating/drinking at least 3 hours prior to bedtime. Eliminate caffeine and carbonated beverages.  Weight loss encouraged if BMI over 25.        Increase apriso    Get labs and us     See back in 4-6 weeks. If not better plan egd. Go to ed if symptoms worsen

## 2018-11-21 ENCOUNTER — HOSPITAL ENCOUNTER (OUTPATIENT)
Dept: ULTRASOUND IMAGING | Facility: HOSPITAL | Age: 43
End: 2018-11-21
Attending: INTERNAL MEDICINE

## 2018-11-28 ENCOUNTER — APPOINTMENT (OUTPATIENT)
Dept: LAB | Facility: HOSPITAL | Age: 43
End: 2018-11-28
Attending: INTERNAL MEDICINE

## 2018-11-28 ENCOUNTER — HOSPITAL ENCOUNTER (OUTPATIENT)
Dept: ULTRASOUND IMAGING | Facility: HOSPITAL | Age: 43
Discharge: HOME | End: 2018-11-28
Attending: INTERNAL MEDICINE | Admitting: INTERNAL MEDICINE

## 2018-11-28 DIAGNOSIS — R10.13 EPIGASTRIC PAIN: ICD-10-CM

## 2018-11-28 LAB
ALBUMIN SERPL-MCNC: 4.2 G/DL (ref 3.5–5.2)
ALP SERPL-CCNC: 55 U/L (ref 40–129)
ALT SERPL W P-5'-P-CCNC: 26 U/L (ref 5–41)
ANION GAP SERPL CALCULATED.3IONS-SCNC: 11.1 MMOL/L
AST SERPL-CCNC: 21 U/L (ref 5–40)
BILIRUB CONJ SERPL-MCNC: <0.2 MG/DL (ref 0.2–0.3)
BILIRUB INDIRECT SERPL-MCNC: ABNORMAL MG/DL
BILIRUB SERPL-MCNC: 0.5 MG/DL (ref 0.2–1.2)
BUN BLD-MCNC: 11 MG/DL (ref 6–20)
BUN/CREAT SERPL: 14.5 (ref 7–25)
CALCIUM SPEC-SCNC: 9.2 MG/DL (ref 8.6–10.5)
CHLORIDE SERPL-SCNC: 103 MMOL/L (ref 98–107)
CO2 SERPL-SCNC: 25.9 MMOL/L (ref 22–29)
CREAT BLD-MCNC: 0.76 MG/DL (ref 0.76–1.27)
GFR SERPL CREATININE-BSD FRML MDRD: 111 ML/MIN/1.73
GLUCOSE BLD-MCNC: 95 MG/DL (ref 65–99)
LIPASE SERPL-CCNC: 25 U/L (ref 13–60)
POTASSIUM BLD-SCNC: 4.2 MMOL/L (ref 3.5–5.2)
PROT SERPL-MCNC: 7.3 G/DL (ref 6–8.5)
SODIUM BLD-SCNC: 140 MMOL/L (ref 136–145)

## 2018-11-28 PROCEDURE — 80048 BASIC METABOLIC PNL TOTAL CA: CPT | Performed by: INTERNAL MEDICINE

## 2018-11-28 PROCEDURE — 36415 COLL VENOUS BLD VENIPUNCTURE: CPT | Performed by: INTERNAL MEDICINE

## 2018-11-28 PROCEDURE — 76705 ECHO EXAM OF ABDOMEN: CPT

## 2018-11-28 PROCEDURE — 80076 HEPATIC FUNCTION PANEL: CPT | Performed by: INTERNAL MEDICINE

## 2018-11-28 PROCEDURE — 83690 ASSAY OF LIPASE: CPT | Performed by: INTERNAL MEDICINE

## 2019-04-08 RX ORDER — OMEPRAZOLE 20 MG/1
CAPSULE, DELAYED RELEASE ORAL
Qty: 180 CAPSULE | Refills: 3 | Status: SHIPPED | OUTPATIENT
Start: 2019-04-08 | End: 2020-05-06 | Stop reason: SDUPTHER

## 2019-04-16 ENCOUNTER — APPOINTMENT (OUTPATIENT)
Dept: GENERAL RADIOLOGY | Facility: HOSPITAL | Age: 44
End: 2019-04-16

## 2019-04-16 ENCOUNTER — HOSPITAL ENCOUNTER (EMERGENCY)
Facility: HOSPITAL | Age: 44
Discharge: HOME | End: 2019-04-16
Attending: EMERGENCY MEDICINE | Admitting: EMERGENCY MEDICINE

## 2019-04-16 VITALS
SYSTOLIC BLOOD PRESSURE: 128 MMHG | WEIGHT: 230 LBS | TEMPERATURE: 98 F | HEART RATE: 65 BPM | RESPIRATION RATE: 16 BRPM | DIASTOLIC BLOOD PRESSURE: 98 MMHG | OXYGEN SATURATION: 96 % | BODY MASS INDEX: 32.93 KG/M2 | HEIGHT: 70 IN

## 2019-04-16 DIAGNOSIS — S20.211A CONTUSION OF RIB ON RIGHT SIDE, INITIAL ENCOUNTER: Primary | ICD-10-CM

## 2019-04-16 PROCEDURE — 99282 EMERGENCY DEPT VISIT SF MDM: CPT

## 2019-04-16 PROCEDURE — 71101 X-RAY EXAM UNILAT RIBS/CHEST: CPT

## 2019-04-16 PROCEDURE — 99282 EMERGENCY DEPT VISIT SF MDM: CPT | Performed by: EMERGENCY MEDICINE

## 2019-04-16 RX ORDER — HYDROCODONE BITARTRATE AND ACETAMINOPHEN 5; 325 MG/1; MG/1
1 TABLET ORAL EVERY 6 HOURS PRN
Qty: 15 TABLET | Refills: 0 | Status: SHIPPED | OUTPATIENT
Start: 2019-04-16 | End: 2019-05-06

## 2019-04-16 RX ORDER — NABUMETONE 750 MG/1
750 TABLET, FILM COATED ORAL 2 TIMES DAILY PRN
Qty: 14 TABLET | Refills: 0 | Status: SHIPPED | OUTPATIENT
Start: 2019-04-16 | End: 2019-04-23

## 2019-05-06 ENCOUNTER — OFFICE VISIT (OUTPATIENT)
Dept: FAMILY MEDICINE CLINIC | Facility: CLINIC | Age: 44
End: 2019-05-06

## 2019-05-06 VITALS
TEMPERATURE: 98.4 F | SYSTOLIC BLOOD PRESSURE: 124 MMHG | BODY MASS INDEX: 33.73 KG/M2 | DIASTOLIC BLOOD PRESSURE: 86 MMHG | OXYGEN SATURATION: 96 % | HEART RATE: 68 BPM | WEIGHT: 235.6 LBS | HEIGHT: 70 IN | RESPIRATION RATE: 18 BRPM

## 2019-05-06 DIAGNOSIS — M25.511 ACUTE PAIN OF RIGHT SHOULDER: ICD-10-CM

## 2019-05-06 DIAGNOSIS — I10 ESSENTIAL HYPERTENSION: Primary | ICD-10-CM

## 2019-05-06 PROCEDURE — 99213 OFFICE O/P EST LOW 20 MIN: CPT | Performed by: INTERNAL MEDICINE

## 2019-05-06 NOTE — PROGRESS NOTES
Subjective   Arun Painting is a 43 y.o. male.     Chief Complaint   Patient presents with   • Arm Pain     right arm has been going on since really bad for about 6 months   • Hypertension       History of Present Illness   In follow-up for hypertension.  Has no chest pain shortness of breath.  Blood pressure under control.  Complains of right shoulder pain for few weeks.  Getting worse.  Having trouble moving around.  No chest pain  The following portions of the patient's history were reviewed and updated as appropriate: allergies, current medications, past family history, past medical history, past social history, past surgical history and problem list.    Review of Systems   Constitutional: Negative for activity change, appetite change, fatigue and fever.   Eyes: Negative for blurred vision and double vision.   Respiratory: Negative for shortness of breath.    Cardiovascular: Negative for chest pain, palpitations and leg swelling.   Gastrointestinal: Negative.    Musculoskeletal: Positive for joint swelling.        Right shoulder pain   Neurological: Negative for dizziness, syncope, light-headedness and headache.       Allergies   Allergen Reactions   • Asacol [Mesalamine] Rash       Current Outpatient Medications on File Prior to Visit   Medication Sig Dispense Refill   • amLODIPine (NORVASC) 5 MG tablet Take 5 mg by mouth daily.     • APRISO 0.375 g 24 hr capsule TAKE ONE CAPSULE BY MOUTH FOUR TIMES A  capsule 5   • omeprazole (priLOSEC) 20 MG capsule TAKE TWO CAPSULES BY MOUTH DAILY 180 capsule 3   • [DISCONTINUED] HYDROcodone-acetaminophen (NORCO) 5-325 MG per tablet Take 1 tablet by mouth Every 6 (Six) Hours As Needed for Moderate Pain  or Severe Pain  (2 tabs if needed) for up to 15 doses. 15 tablet 0   • [DISCONTINUED] hydrocortisone (ANUSOL-HC) 2.5 % rectal cream Insert  into the rectum 2 (two) times a day. 30 g 5     No current facility-administered medications on file prior to visit.         Family History   Problem Relation Age of Onset   • Colon polyps Mother    • Colon cancer Neg Hx        Past Medical History:   Diagnosis Date   • Esophagitis    • Gastritis    • GERD (gastroesophageal reflux disease)    • Hiatal hernia    • Hypertension    • Ulcerative colitis (CMS/HCC)        Past Surgical History:   Procedure Laterality Date   • COLONOSCOPY  12/18/2013    stool, mild inflammation, mild-moderate chronic colitis w/crypt abscess    • COLONOSCOPY N/A 10/11/2017    Procedure: COLONOSCOPY  w/ biopsies; polypectomy;  Surgeon: Arlyn Cam MD;  Location: [facility];  Service:    • UPPER GASTROINTESTINAL ENDOSCOPY  08/07/2013    z line irregular, 38 cm from incisors, HH, gastritis, active esophagitis w/changes suggestive of an adjacent erosion, no h-pylorino Curran's       Social History     Socioeconomic History   • Marital status:      Spouse name: Not on file   • Number of children: Not on file   • Years of education: Not on file   • Highest education level: Not on file   Tobacco Use   • Smoking status: Never Smoker   • Smokeless tobacco: Never Used   Substance and Sexual Activity   • Alcohol use: Yes     Alcohol/week: 3.6 oz     Types: 6 Cans of beer per week     Comment: weekly   • Drug use: No   • Sexual activity: Defer       Patient Active Problem List   Diagnosis   • Ulcerative colitis without complications (CMS/HCC)       Vitals:    05/06/19 1204   BP: 124/86   Pulse: 68   Resp: 18   Temp: 98.4 °F (36.9 °C)   SpO2: 96%       Objective   Physical Exam   Constitutional: He is oriented to person, place, and time. He appears well-developed.   Eyes: Pupils are equal, round, and reactive to light.   Neck: Normal range of motion. Neck supple. No JVD present. No tracheal deviation present. No thyromegaly present.   Cardiovascular: Normal rate, regular rhythm and normal heart sounds. Exam reveals no gallop and no friction rub.   No murmur heard.  Pulmonary/Chest: Effort normal and breath  sounds normal. No respiratory distress. He has no wheezes.   Abdominal: Soft. Bowel sounds are normal. He exhibits no distension and no mass. There is no tenderness. There is no rebound and no guarding. No hernia.   Musculoskeletal:   Right shoulder with decreased range of motion and tenderness.   Lymphadenopathy:     He has no cervical adenopathy.   Neurological: He is alert and oriented to person, place, and time.   Psychiatric: He has a normal mood and affect. His behavior is normal.         Assessment/Plan   Arun was seen today for arm pain and hypertension.    Diagnoses and all orders for this visit:    Essential hypertension  -     Comprehensive Metabolic Panel    Acute pain of right shoulder  -     Ambulatory Referral to Orthopedic Surgery    Continue medication.  Check blood pressure.  Referral made for orthopedics.  Return in 3 months time.  Diet and exercise.  Keep follow-up with GI

## 2019-05-07 LAB
ALBUMIN SERPL-MCNC: 4.5 G/DL (ref 3.5–5.2)
ALBUMIN/GLOB SERPL: 1.5 G/DL
ALP SERPL-CCNC: 67 U/L (ref 39–117)
ALT SERPL-CCNC: 32 U/L (ref 1–41)
AST SERPL-CCNC: 23 U/L (ref 1–40)
BILIRUB SERPL-MCNC: 0.4 MG/DL (ref 0.2–1.2)
BUN SERPL-MCNC: 14 MG/DL (ref 6–20)
BUN/CREAT SERPL: 16.9 (ref 7–25)
CALCIUM SERPL-MCNC: 9.6 MG/DL (ref 8.6–10.5)
CHLORIDE SERPL-SCNC: 106 MMOL/L (ref 98–107)
CO2 SERPL-SCNC: 24.7 MMOL/L (ref 22–29)
CREAT SERPL-MCNC: 0.83 MG/DL (ref 0.76–1.27)
GLOBULIN SER CALC-MCNC: 3 GM/DL
GLUCOSE SERPL-MCNC: 82 MG/DL (ref 65–99)
POTASSIUM SERPL-SCNC: 3.9 MMOL/L (ref 3.5–5.2)
PROT SERPL-MCNC: 7.5 G/DL (ref 6–8.5)
SODIUM SERPL-SCNC: 143 MMOL/L (ref 136–145)

## 2019-05-10 RX ORDER — AMLODIPINE BESYLATE 5 MG/1
TABLET ORAL
Qty: 30 TABLET | Refills: 2 | Status: SHIPPED | OUTPATIENT
Start: 2019-05-10 | End: 2019-08-28 | Stop reason: SDUPTHER

## 2019-06-05 ENCOUNTER — OFFICE VISIT (OUTPATIENT)
Dept: ORTHOPEDIC SURGERY | Facility: CLINIC | Age: 44
End: 2019-06-05

## 2019-06-05 VITALS — WEIGHT: 230 LBS | TEMPERATURE: 97.9 F | HEIGHT: 70 IN | BODY MASS INDEX: 32.93 KG/M2

## 2019-06-05 DIAGNOSIS — M19.019 ARTHRITIS OF SHOULDER: ICD-10-CM

## 2019-06-05 DIAGNOSIS — M25.511 ACUTE PAIN OF RIGHT SHOULDER: Primary | ICD-10-CM

## 2019-06-05 PROCEDURE — 73030 X-RAY EXAM OF SHOULDER: CPT | Performed by: ORTHOPAEDIC SURGERY

## 2019-06-05 PROCEDURE — 99203 OFFICE O/P NEW LOW 30 MIN: CPT | Performed by: ORTHOPAEDIC SURGERY

## 2019-06-05 RX ORDER — MELOXICAM 15 MG/1
15 TABLET ORAL DAILY PRN
Qty: 30 TABLET | Refills: 2 | Status: SHIPPED | OUTPATIENT
Start: 2019-06-05 | End: 2021-11-04

## 2019-06-05 NOTE — PROGRESS NOTES
Patient: Arun Painting    YOB: 1975    Medical Record Number: [id number]    Chief Complaints:   Right shoulder pain    History of Present Illness:     43 y.o. male patient who presents with right shoulder pain.  His symptoms started about 3 or 4 months ago.  He does not recall any injury.  He works as a  and does quite a bit of heavy lifting.  He thinks that this is what has aggravated the shoulder.  Pain was initially severe.  He tells me his current pain is now more of a mild to moderate dull ache.  He is able to do his job and get through the day.  Aleve does help temporarily.  Localizes his pain to the front and top of the shoulder.    Allergies:   Allergies   Allergen Reactions   • Asacol [Mesalamine] Rash       Home Medications:    Current Outpatient Medications:   •  amLODIPine (NORVASC) 5 MG tablet, TAKE ONE TABLET BY MOUTH DAILY, Disp: 30 tablet, Rfl: 2  •  APRISO 0.375 g 24 hr capsule, TAKE ONE CAPSULE BY MOUTH FOUR TIMES A DAY, Disp: 120 capsule, Rfl: 5  •  omeprazole (priLOSEC) 20 MG capsule, TAKE TWO CAPSULES BY MOUTH DAILY, Disp: 180 capsule, Rfl: 3    Past Medical History:   Diagnosis Date   • Esophagitis    • Gastritis    • GERD (gastroesophageal reflux disease)    • Hiatal hernia    • Hypertension    • Ulcerative colitis (CMS/HCC)        Past Surgical History:   Procedure Laterality Date   • COLONOSCOPY  12/18/2013    stool, mild inflammation, mild-moderate chronic colitis w/crypt abscess    • COLONOSCOPY N/A 10/11/2017    Procedure: COLONOSCOPY  w/ biopsies; polypectomy;  Surgeon: Arlyn Cam MD;  Location: [facility];  Service:    • UPPER GASTROINTESTINAL ENDOSCOPY  08/07/2013    z line irregular, 38 cm from incisors, HH, gastritis, active esophagitis w/changes suggestive of an adjacent erosion, no h-pylorino Curran's       Social History     Occupational History   • Not on file   Tobacco Use   • Smoking status: Never Smoker   • Smokeless tobacco: Never Used  "  Substance and Sexual Activity   • Alcohol use: Yes     Alcohol/week: 3.6 oz     Types: 6 Cans of beer per week     Comment: weekly   • Drug use: No   • Sexual activity: Defer      Social History     Social History Narrative   • Not on file       Family History   Problem Relation Age of Onset   • Colon polyps Mother    • Colon cancer Neg Hx        Review of Systems:      Constitutional: Denies fever, shaking or chills   Eyes: Denies change in visual acuity   HEENT: Denies nasal congestion or sore throat   Respiratory: Denies cough or shortness of breath   Cardiovascular: Denies chest pain or edema  Endocrine: Denies tremors, palpitations, intolerance of heat or cold, polyuria, polydipsia.  GI: Denies abdominal pain, nausea, vomiting, bloody stools or diarrhea  : Denies frequency, urgency, incontinence, retention, or nocturia.  Musculoskeletal: Denies numbness, tingling or loss of motor function except as above  Integument: Denies rash, lesion or ulceration   Neurologic: Denies headache or focal weakness, deficits  Heme: Denies spontaneous or excessive bleeding, epistaxis, hematuria, melena, fatigue, enlarged or tender lymph nodes.      All other pertinent positives and negatives as noted above in HPI.    Physical Exam: 43 y.o. male    Vitals:    06/05/19 0907   Temp: 97.9 °F (36.6 °C)   Weight: 104 kg (230 lb)   Height: 177.8 cm (70\")     General:  Patient is awake and alert.  Appears in no acute distress or discomfort.    Psych:  Affect and demeanor are appropriate.    Eyes:  Conjunctiva and sclera appear grossly normal.  Eyes track well and EOM seem to be intact.    Ears:  No gross abnormalities.  Hearing adequate for the exam.    Cardiovascular:  Regular rate and rhythm.    Lungs:  Good chest expansion.  Breathing unlabored.    Lymph:  No palpable adenopathy about neck or axilla.    Neck:  Supple.  Normal ROM.  Negative Spurling's for shoulder or arm pain.    Right shoulder:  Skin is benign.  No gross " abnormalities on inspection.  No palpable masses or adenopathy.  No effusion.  Mild tenderness over the acromioclavicular joint.  Positive active compression maneuver.  Full symmetric motion.  No instability.  Good strength with forward flexion, abduction, internal and external rotation, elbow flexion and extension as well as resisted forearm pronation and supination.  Sensation is intact.  Brisk capillary refill in the fingers with good skin turgor.     Radiology:   AP, scapular Y, and axillary views of the right shoulder are ordered by myself and reviewed to evaluate the patient's complaint.  No comparison films are immediately available.  The x-rays show moderate acromioclavicular arthritis.  There are no obvious acute abnormalities, lesions, masses, significant glenohumeral degenerative changes, or other concerning findings.  The acromiohumeral interval is normal.      Assessment/Plan:   Right shoulder symptomatic acromioclavicular arthritis    Discussed options in detail including conservative versus surgical options. I have recommended that we start with a conservative approach. With regards to conservative options, we discussed appropriate activity modifications, icing as needed, anti-inflammatories, physical therapy, and injections.  Patient has acknowledged understanding of this information and elected for trial of activity modifications and a prescription strength anti-inflammatory.  He was given a prescription for meloxicam to take as needed.  Risks of the medicine were discussed.  If the symptoms persist, he may be a candidate for an injection in the future.  He will call as needed going forward.    Domenico Hansen MD    06/05/2019    CC to Karen Jaffe MD

## 2019-08-29 RX ORDER — AMLODIPINE BESYLATE 5 MG/1
TABLET ORAL
Qty: 30 TABLET | Refills: 5 | Status: SHIPPED | OUTPATIENT
Start: 2019-08-29 | End: 2020-03-19

## 2019-09-11 ENCOUNTER — OFFICE VISIT (OUTPATIENT)
Dept: FAMILY MEDICINE CLINIC | Facility: CLINIC | Age: 44
End: 2019-09-11

## 2019-09-11 VITALS
WEIGHT: 228 LBS | OXYGEN SATURATION: 98 % | HEIGHT: 70 IN | BODY MASS INDEX: 32.64 KG/M2 | RESPIRATION RATE: 14 BRPM | DIASTOLIC BLOOD PRESSURE: 80 MMHG | TEMPERATURE: 97.9 F | HEART RATE: 67 BPM | SYSTOLIC BLOOD PRESSURE: 124 MMHG

## 2019-09-11 DIAGNOSIS — I10 ESSENTIAL HYPERTENSION: Primary | ICD-10-CM

## 2019-09-11 DIAGNOSIS — R53.83 LETHARGY: ICD-10-CM

## 2019-09-11 DIAGNOSIS — E78.2 MIXED HYPERLIPIDEMIA: ICD-10-CM

## 2019-09-11 PROBLEM — L72.0 ATHEROMA, SKIN: Status: ACTIVE | Noted: 2019-09-11

## 2019-09-11 LAB
ALBUMIN SERPL-MCNC: 4.5 G/DL (ref 3.5–5.2)
ALBUMIN/GLOB SERPL: 1.5 G/DL
ALP SERPL-CCNC: 62 U/L (ref 39–117)
ALT SERPL-CCNC: 27 U/L (ref 1–41)
AST SERPL-CCNC: 22 U/L (ref 1–40)
BASOPHILS # BLD AUTO: 0.04 10*3/MM3 (ref 0–0.2)
BASOPHILS NFR BLD AUTO: 0.5 % (ref 0–1.5)
BILIRUB SERPL-MCNC: 0.7 MG/DL (ref 0.2–1.2)
BUN SERPL-MCNC: 11 MG/DL (ref 6–20)
BUN/CREAT SERPL: 14.1 (ref 7–25)
CALCIUM SERPL-MCNC: 9.4 MG/DL (ref 8.6–10.5)
CHLORIDE SERPL-SCNC: 101 MMOL/L (ref 98–107)
CHOLEST SERPL-MCNC: 203 MG/DL (ref 0–200)
CHOLEST/HDLC SERPL: 4.51 {RATIO}
CO2 SERPL-SCNC: 24.4 MMOL/L (ref 22–29)
CREAT SERPL-MCNC: 0.78 MG/DL (ref 0.76–1.27)
EOSINOPHIL # BLD AUTO: 0.2 10*3/MM3 (ref 0–0.4)
EOSINOPHIL NFR BLD AUTO: 2.6 % (ref 0.3–6.2)
ERYTHROCYTE [DISTWIDTH] IN BLOOD BY AUTOMATED COUNT: 12.3 % (ref 12.3–15.4)
GLOBULIN SER CALC-MCNC: 3 GM/DL
GLUCOSE SERPL-MCNC: 78 MG/DL (ref 65–99)
HCT VFR BLD AUTO: 44.6 % (ref 37.5–51)
HDLC SERPL-MCNC: 45 MG/DL (ref 40–60)
HGB BLD-MCNC: 15 G/DL (ref 13–17.7)
IMM GRANULOCYTES # BLD AUTO: 0.02 10*3/MM3 (ref 0–0.05)
IMM GRANULOCYTES NFR BLD AUTO: 0.3 % (ref 0–0.5)
LDLC SERPL CALC-MCNC: 124 MG/DL (ref 0–100)
LYMPHOCYTES # BLD AUTO: 2.45 10*3/MM3 (ref 0.7–3.1)
LYMPHOCYTES NFR BLD AUTO: 31.9 % (ref 19.6–45.3)
MCH RBC QN AUTO: 30.1 PG (ref 26.6–33)
MCHC RBC AUTO-ENTMCNC: 33.6 G/DL (ref 31.5–35.7)
MCV RBC AUTO: 89.6 FL (ref 79–97)
MONOCYTES # BLD AUTO: 0.65 10*3/MM3 (ref 0.1–0.9)
MONOCYTES NFR BLD AUTO: 8.5 % (ref 5–12)
NEUTROPHILS # BLD AUTO: 4.31 10*3/MM3 (ref 1.7–7)
NEUTROPHILS NFR BLD AUTO: 56.2 % (ref 42.7–76)
NRBC BLD AUTO-RTO: 0 /100 WBC (ref 0–0.2)
PLATELET # BLD AUTO: 213 10*3/MM3 (ref 140–450)
POTASSIUM SERPL-SCNC: 3.7 MMOL/L (ref 3.5–5.2)
PROT SERPL-MCNC: 7.5 G/DL (ref 6–8.5)
RBC # BLD AUTO: 4.98 10*6/MM3 (ref 4.14–5.8)
SODIUM SERPL-SCNC: 141 MMOL/L (ref 136–145)
TRIGL SERPL-MCNC: 170 MG/DL (ref 0–150)
TSH SERPL DL<=0.005 MIU/L-ACNC: 0.95 UIU/ML (ref 0.27–4.2)
VLDLC SERPL CALC-MCNC: 34 MG/DL
WBC # BLD AUTO: 7.67 10*3/MM3 (ref 3.4–10.8)

## 2019-09-11 PROCEDURE — 99213 OFFICE O/P EST LOW 20 MIN: CPT | Performed by: INTERNAL MEDICINE

## 2019-09-11 NOTE — PROGRESS NOTES
LYNDSAYBEGIN@  Arun Painting is a 44 y.o. male.     Chief Complaint   Patient presents with   • Hypertension   • Heartburn   Ulcerative colitis    History of Present Illness   Patient here follow-up for hypertension, mild hyperlipidemia.  Denies any chest pain shortness of breath.  Occasional feels little tired but he thinks because of night shift.  No chest pain or shortness of breath.  Blood pressure control.  He is following with the gastroenterology for ulcerative colitis.  His heartburn is under control.  The following portions of the patient's history were reviewed and updated as appropriate: allergies, current medications, past family history, past medical history, past social history, past surgical history and problem list.    Review of Systems   Constitutional: Negative for activity change, appetite change, fatigue and fever.   Eyes: Negative for blurred vision and double vision.   Respiratory: Negative for shortness of breath.    Cardiovascular: Negative for chest pain, palpitations and leg swelling.   Gastrointestinal: Negative.    Neurological: Negative for dizziness, syncope, light-headedness and headache.   Psychiatric/Behavioral: Negative for dysphoric mood and depressed mood.       Allergies   Allergen Reactions   • Asacol [Mesalamine] Rash       Current Outpatient Medications on File Prior to Visit   Medication Sig Dispense Refill   • amLODIPine (NORVASC) 5 MG tablet TAKE ONE TABLET BY MOUTH DAILY 30 tablet 5   • APRISO 0.375 g 24 hr capsule TAKE ONE CAPSULE BY MOUTH FOUR TIMES A  capsule 5   • omeprazole (priLOSEC) 20 MG capsule TAKE TWO CAPSULES BY MOUTH DAILY 180 capsule 3   • meloxicam (MOBIC) 15 MG tablet Take 1 tablet by mouth Daily As Needed for Moderate Pain . Take as directed with food. 30 tablet 2     No current facility-administered medications on file prior to visit.        Family History   Problem Relation Age of Onset   • Colon polyps Mother    • Colon cancer Neg Hx         Past Medical History:   Diagnosis Date   • Esophagitis    • Gastritis    • GERD (gastroesophageal reflux disease)    • Hiatal hernia    • Hypertension    • Ulcerative colitis (CMS/HCC)        Past Surgical History:   Procedure Laterality Date   • COLONOSCOPY  12/18/2013    stool, mild inflammation, mild-moderate chronic colitis w/crypt abscess    • COLONOSCOPY N/A 10/11/2017    Procedure: COLONOSCOPY  w/ biopsies; polypectomy;  Surgeon: Arlyn Cam MD;  Location: [facility];  Service:    • UPPER GASTROINTESTINAL ENDOSCOPY  08/07/2013    z line irregular, 38 cm from incisors, HH, gastritis, active esophagitis w/changes suggestive of an adjacent erosion, no h-pylorino Curran's       Social History     Socioeconomic History   • Marital status:      Spouse name: Not on file   • Number of children: Not on file   • Years of education: Not on file   • Highest education level: Not on file   Tobacco Use   • Smoking status: Never Smoker   • Smokeless tobacco: Never Used   Substance and Sexual Activity   • Alcohol use: Yes     Alcohol/week: 3.6 oz     Types: 6 Cans of beer per week     Comment: weekly   • Drug use: No   • Sexual activity: Defer       Patient Active Problem List   Diagnosis   • Ulcerative colitis without complications (CMS/HCC)   • Hypertension   • Atheroma, skin   • Mixed hyperlipidemia       Vitals:    09/11/19 1009   BP: 124/80   Pulse: 67   Resp: 14   Temp: 97.9 °F (36.6 °C)   SpO2: 98%       Objective   Physical Exam   Constitutional: He is oriented to person, place, and time. He appears well-developed.   HENT:   Head: Normocephalic and atraumatic.   Neck: Normal range of motion. Neck supple.   Cardiovascular: Normal rate, regular rhythm and normal heart sounds.   Pulmonary/Chest: Effort normal and breath sounds normal.   Abdominal: Soft. Bowel sounds are normal. He exhibits no distension and no mass. There is no tenderness. There is no guarding.   Neurological: He is alert and oriented to  person, place, and time. No cranial nerve deficit.   Psychiatric: He has a normal mood and affect. His behavior is normal.   Nursing note and vitals reviewed.        Assessment/Plan   Arun was seen today for hypertension and heartburn.    Diagnoses and all orders for this visit:    Essential hypertension  -     CBC & Differential  -     Comprehensive Metabolic Panel  -     Lipid Panel With / Chol / HDL Ratio  -     TSH    Mixed hyperlipidemia  -     CBC & Differential  -     Comprehensive Metabolic Panel  -     Lipid Panel With / Chol / HDL Ratio  -     TSH    Lethargy    Continue diet and exercise.  Continue current medications.  Check blood pressure.  Return in 3 months time.  His blood pressure and cholesterol are under control.  He follows with GI for us of the problems

## 2019-09-20 ENCOUNTER — TELEPHONE (OUTPATIENT)
Dept: GASTROENTEROLOGY | Facility: CLINIC | Age: 44
End: 2019-09-20

## 2019-09-20 DIAGNOSIS — Z83.71 FAMILY HISTORY OF POLYPS IN THE COLON: ICD-10-CM

## 2019-09-20 DIAGNOSIS — Z86.010 PERSONAL HISTORY OF COLONIC POLYPS: Primary | ICD-10-CM

## 2019-09-23 PROBLEM — Z86.010 PERSONAL HISTORY OF COLONIC POLYPS: Status: ACTIVE | Noted: 2019-09-23

## 2019-09-23 PROBLEM — Z86.0100 PERSONAL HISTORY OF COLONIC POLYPS: Status: ACTIVE | Noted: 2019-09-23

## 2019-09-23 PROBLEM — Z83.719 FAMILY HISTORY OF POLYPS IN THE COLON: Status: ACTIVE | Noted: 2019-09-23

## 2019-09-23 PROBLEM — Z83.71 FAMILY HISTORY OF POLYPS IN THE COLON: Status: ACTIVE | Noted: 2019-09-23

## 2019-09-23 NOTE — TELEPHONE ENCOUNTER
emailed instructions    Dr. Arlyn Cam   Date: _____10/30/19_________     Arrival Time: __8:15am_______    Hospital:  [facility] ([address] [place] [address] [place] [address]) (back of hospital at the emergency room)         Please call the office as soon as possible if you need to reschedule or cancel your procedure please give two weeks’ notice.  If you do not show up or frequently reschedule your procedure, your provider has the option of not rescheduling.    Stop the following medications 5 days prior to your procedure- check with the prescribing doctor prior to holding.  No Aspirin, Advil, Aleve, Motrin, IBU or anything listed on the back of this form.    If you are taking any medications on the attached list and/or pills that contain iron please stop them one week prior. Insulin will be addressed separately.    1.  your prescription AND a 10 oz. bottle of Magnesium Citrate (over the counter) as soon as possible. Do not wait until the day before in case of issues with cost or etc.    The day before your procedure  It is very important that you follow the instructions on this form and not on the prep you were prescribed.    You will be on a clear liquid diet only which may include coffee , tea, soft drinks, broth(chicken beef or vegetable), popsicles, Jell-O, Gatorade, juice (no orange, grapefruit or V-8).  ®No red or purple dyes and no dairy or non-dairy.    2. At 8:00 am drink the bottle of magnesium citrate.  Drink plenty of fluids in between    3. At    2:00 pm drink first dose or ½ of prep, mix as directed on container/box    Drink plenty of fluids between    4. At   10:00 pm      drink second dose or ½ of prep, mix as directed on container/box    5. If you start to get nauseated while drinking your prep try stepping away for 15-20 min. then resume again at a slower pace.    You may have clear liquids only up to 4 hours before your procedure.  No gum or candy!     No smoking or tobacco  products!    You may only take your morning prescription blood pressure (no diuretic combinations), breathing, seizure, psych or heart medications.     You will need a  to accompany you for your exam. Bus or uber not allowed The average time spent in our facility is around 2-3 hours.  You are not to drive, operate machinery or make legal decisions the remainder of the day following you procedure.    Please call [email or web address]@ [phone] if you have questions about any of this information.  If it is after hours or the weekend and you need to reach the doctor or have questions for the office please call [phone].     It is your responsibility to check with your insurance company to determine benefits and out of pocket costs.      Avoid these medications 5-7 days prior to surgery  Please check with your prescribing doctor before stopping any medications  NSAIDS- Advil, Aleve, Motrin, Ibuprofen, Midol, Excedrin, Fiorinal, Pol[place]  (Some cold medications may have these in them)    All herbal medications- iron, vitamin E, fish oil, decongestants (phenylephrine, pseudoephedrine), ginkgo, garlic, ginseng, Roberto’s wart    Mobic (meloxicam), Celebrex, Diclofenac (Voltaren), Nambumetone (Relafen), Daypro, Naproxen, Sulindac, Indomethacin, Toradol, Feldine, Salsalate, Etodolac (Lodine),     Viagra, Cialis, Levitra    Aspirin, Plavix (clopidogrel), Effient, Pletal, Coumadin, Pradaxa, Brilinta, Ticlide, Eliquis, (Xaralto- 3 days)      Diet pills -Adipex (phentermine) -2 weeks prior

## 2019-10-11 ENCOUNTER — PREP FOR SURGERY (OUTPATIENT)
Dept: OTHER | Facility: HOSPITAL | Age: 44
End: 2019-10-11

## 2019-10-29 ENCOUNTER — ANESTHESIA EVENT (OUTPATIENT)
Dept: PERIOP | Facility: HOSPITAL | Age: 44
End: 2019-10-29

## 2019-10-30 ENCOUNTER — ANESTHESIA (OUTPATIENT)
Dept: PERIOP | Facility: HOSPITAL | Age: 44
End: 2019-10-30

## 2019-10-30 ENCOUNTER — HOSPITAL ENCOUNTER (OUTPATIENT)
Facility: HOSPITAL | Age: 44
Setting detail: HOSPITAL OUTPATIENT SURGERY
Discharge: HOME | End: 2019-10-30
Attending: INTERNAL MEDICINE | Admitting: INTERNAL MEDICINE

## 2019-10-30 VITALS
SYSTOLIC BLOOD PRESSURE: 111 MMHG | HEART RATE: 59 BPM | TEMPERATURE: 97.8 F | RESPIRATION RATE: 16 BRPM | BODY MASS INDEX: 33.12 KG/M2 | HEIGHT: 70 IN | DIASTOLIC BLOOD PRESSURE: 91 MMHG | WEIGHT: 231.38 LBS | OXYGEN SATURATION: 97 %

## 2019-10-30 DIAGNOSIS — Z86.010 PERSONAL HISTORY OF COLONIC POLYPS: ICD-10-CM

## 2019-10-30 DIAGNOSIS — Z83.71 FAMILY HISTORY OF POLYPS IN THE COLON: ICD-10-CM

## 2019-10-30 PROCEDURE — 45380 COLONOSCOPY AND BIOPSY: CPT | Performed by: INTERNAL MEDICINE

## 2019-10-30 PROCEDURE — 88305 TISSUE EXAM BY PATHOLOGIST: CPT | Performed by: INTERNAL MEDICINE

## 2019-10-30 PROCEDURE — 25010000002 PROPOFOL 10 MG/ML EMULSION: Performed by: NURSE ANESTHETIST, CERTIFIED REGISTERED

## 2019-10-30 RX ORDER — ONDANSETRON 2 MG/ML
4 INJECTION INTRAMUSCULAR; INTRAVENOUS ONCE AS NEEDED
Status: DISCONTINUED | OUTPATIENT
Start: 2019-10-30 | End: 2019-10-30 | Stop reason: HOSPADM

## 2019-10-30 RX ORDER — LIDOCAINE HYDROCHLORIDE 20 MG/ML
INJECTION, SOLUTION INFILTRATION; PERINEURAL AS NEEDED
Status: DISCONTINUED | OUTPATIENT
Start: 2019-10-30 | End: 2019-10-30 | Stop reason: SURG

## 2019-10-30 RX ORDER — LIDOCAINE HYDROCHLORIDE 10 MG/ML
0.5 INJECTION, SOLUTION EPIDURAL; INFILTRATION; INTRACAUDAL; PERINEURAL ONCE AS NEEDED
Status: COMPLETED | OUTPATIENT
Start: 2019-10-30 | End: 2019-10-30

## 2019-10-30 RX ORDER — SODIUM CHLORIDE 0.9 % (FLUSH) 0.9 %
3 SYRINGE (ML) INJECTION EVERY 12 HOURS SCHEDULED
Status: DISCONTINUED | OUTPATIENT
Start: 2019-10-30 | End: 2019-10-30 | Stop reason: HOSPADM

## 2019-10-30 RX ORDER — SODIUM CHLORIDE, SODIUM LACTATE, POTASSIUM CHLORIDE, CALCIUM CHLORIDE 600; 310; 30; 20 MG/100ML; MG/100ML; MG/100ML; MG/100ML
100 INJECTION, SOLUTION INTRAVENOUS CONTINUOUS
Status: DISCONTINUED | OUTPATIENT
Start: 2019-10-30 | End: 2019-10-30 | Stop reason: HOSPADM

## 2019-10-30 RX ORDER — SODIUM CHLORIDE 9 MG/ML
40 INJECTION, SOLUTION INTRAVENOUS AS NEEDED
Status: DISCONTINUED | OUTPATIENT
Start: 2019-10-30 | End: 2019-10-30 | Stop reason: HOSPADM

## 2019-10-30 RX ORDER — SODIUM CHLORIDE 0.9 % (FLUSH) 0.9 %
1-10 SYRINGE (ML) INJECTION AS NEEDED
Status: DISCONTINUED | OUTPATIENT
Start: 2019-10-30 | End: 2019-10-30 | Stop reason: HOSPADM

## 2019-10-30 RX ORDER — MAGNESIUM HYDROXIDE 1200 MG/15ML
LIQUID ORAL AS NEEDED
Status: DISCONTINUED | OUTPATIENT
Start: 2019-10-30 | End: 2019-10-30 | Stop reason: HOSPADM

## 2019-10-30 RX ORDER — PROPOFOL 10 MG/ML
VIAL (ML) INTRAVENOUS AS NEEDED
Status: DISCONTINUED | OUTPATIENT
Start: 2019-10-30 | End: 2019-10-30 | Stop reason: SURG

## 2019-10-30 RX ORDER — MEPERIDINE HYDROCHLORIDE 25 MG/ML
12.5 INJECTION INTRAMUSCULAR; INTRAVENOUS; SUBCUTANEOUS
Status: DISCONTINUED | OUTPATIENT
Start: 2019-10-30 | End: 2019-10-30 | Stop reason: HOSPADM

## 2019-10-30 RX ORDER — SODIUM CHLORIDE, SODIUM LACTATE, POTASSIUM CHLORIDE, CALCIUM CHLORIDE 600; 310; 30; 20 MG/100ML; MG/100ML; MG/100ML; MG/100ML
9 INJECTION, SOLUTION INTRAVENOUS CONTINUOUS
Status: DISCONTINUED | OUTPATIENT
Start: 2019-10-30 | End: 2019-10-30 | Stop reason: HOSPADM

## 2019-10-30 RX ADMIN — SODIUM CHLORIDE, POTASSIUM CHLORIDE, SODIUM LACTATE AND CALCIUM CHLORIDE 9 ML/HR: 600; 310; 30; 20 INJECTION, SOLUTION INTRAVENOUS at 08:15

## 2019-10-30 RX ADMIN — LIDOCAINE HYDROCHLORIDE 0.5 ML: 10 INJECTION, SOLUTION EPIDURAL; INFILTRATION; INTRACAUDAL; PERINEURAL at 08:15

## 2019-10-30 RX ADMIN — PROPOFOL 50 MG: 10 INJECTION, EMULSION INTRAVENOUS at 09:25

## 2019-10-30 RX ADMIN — PROPOFOL 50 MG: 10 INJECTION, EMULSION INTRAVENOUS at 09:46

## 2019-10-30 RX ADMIN — PROPOFOL 50 MG: 10 INJECTION, EMULSION INTRAVENOUS at 09:28

## 2019-10-30 RX ADMIN — PROPOFOL 100 MG: 10 INJECTION, EMULSION INTRAVENOUS at 09:32

## 2019-10-30 RX ADMIN — LIDOCAINE HYDROCHLORIDE 100 MG: 20 INJECTION, SOLUTION INFILTRATION; PERINEURAL at 09:25

## 2019-10-30 RX ADMIN — PROPOFOL 50 MG: 10 INJECTION, EMULSION INTRAVENOUS at 09:34

## 2019-10-30 RX ADMIN — PROPOFOL 50 MG: 10 INJECTION, EMULSION INTRAVENOUS at 09:51

## 2019-10-30 RX ADMIN — PROPOFOL 50 MG: 10 INJECTION, EMULSION INTRAVENOUS at 09:39

## 2019-10-30 NOTE — ANESTHESIA PREPROCEDURE EVALUATION
Anesthesia Evaluation     Patient summary reviewed and Nursing notes reviewed   no history of anesthetic complications:  NPO Solid Status: > 8 hours  NPO Liquid Status: > 8 hours           Airway   Mallampati: II  TM distance: >3 FB  Neck ROM: full  Possible difficult intubation  Dental - normal exam     Pulmonary - negative pulmonary ROS and normal exam    breath sounds clear to auscultation  Cardiovascular - normal exam    Rhythm: regular  Rate: normal    (+) hypertension poorly controlled less than 2 medications,       Neuro/Psych- negative ROS  GI/Hepatic/Renal/Endo    (+) obesity,  hiatal hernia, GERD well controlled,      Musculoskeletal     Abdominal   (+) obese,    Substance History   (+) alcohol use (6 pack per week),      OB/GYN          Other - negative ROS                       Anesthesia Plan    ASA 2     MAC     intravenous induction   Anesthetic plan, all risks, benefits, and alternatives have been provided, discussed and informed consent has been obtained with: patient.  Use of blood products discussed with patient  Consented to blood products.

## 2019-10-30 NOTE — ANESTHESIA POSTPROCEDURE EVALUATION
Patient: Arun Painting    Procedure Summary     Date:  10/30/19 Room / Location:  [facility] ENDOSCOPY 2 /  [facility]    Anesthesia Start:  0920 Anesthesia Stop:  0955    Procedure:  COLONOSCOPY with biopsies (N/A ) Diagnosis:       Personal history of colonic polyps      Family history of polyps in the colon      (Personal history of colonic polyps [Z86.010])      (Family history of polyps in the colon [Z83.71])    Surgeon:  Arlyn Cam MD Provider:  Kb Brunson CRNA    Anesthesia Type:  MAC ASA Status:  2          Anesthesia Type: MAC  Last vitals  BP   116/81 (10/30/19 1000)   Temp   97.8 °F (36.6 °C) (10/30/19 0957)   Pulse   61 (10/30/19 1000)   Resp   16 (10/30/19 1000)     SpO2   98 % (10/30/19 1000)     Post Anesthesia Care and Evaluation    Patient location during evaluation: bedside  Patient participation: complete - patient participated  Level of consciousness: awake and alert  Pain score: 0  Pain management: adequate  Airway patency: patent  Anesthetic complications: No anesthetic complications    Cardiovascular status: acceptable  Respiratory status: acceptable  Hydration status: acceptable

## 2019-11-01 LAB
CYTO UR: NORMAL
LAB AP CASE REPORT: NORMAL
PATH REPORT.FINAL DX SPEC: NORMAL
PATH REPORT.GROSS SPEC: NORMAL

## 2019-11-08 RX ORDER — MESALAMINE 375 MG/1
CAPSULE, EXTENDED RELEASE ORAL
Qty: 120 CAPSULE | Refills: 4 | Status: SHIPPED | OUTPATIENT
Start: 2019-11-08 | End: 2020-11-11 | Stop reason: SDUPTHER

## 2019-12-04 ENCOUNTER — OFFICE VISIT (OUTPATIENT)
Dept: FAMILY MEDICINE CLINIC | Facility: CLINIC | Age: 44
End: 2019-12-04

## 2019-12-04 VITALS
WEIGHT: 240 LBS | BODY MASS INDEX: 34.36 KG/M2 | OXYGEN SATURATION: 98 % | SYSTOLIC BLOOD PRESSURE: 128 MMHG | TEMPERATURE: 97.7 F | DIASTOLIC BLOOD PRESSURE: 72 MMHG | RESPIRATION RATE: 14 BRPM | HEART RATE: 76 BPM | HEIGHT: 70 IN

## 2019-12-04 DIAGNOSIS — I10 ESSENTIAL HYPERTENSION: Primary | ICD-10-CM

## 2019-12-04 DIAGNOSIS — M77.8 LEFT ELBOW TENDINITIS: ICD-10-CM

## 2019-12-04 DIAGNOSIS — E78.2 MIXED HYPERLIPIDEMIA: ICD-10-CM

## 2019-12-04 DIAGNOSIS — K51.90 ULCERATIVE COLITIS WITHOUT COMPLICATIONS, UNSPECIFIED LOCATION (HCC): ICD-10-CM

## 2019-12-04 PROCEDURE — 99213 OFFICE O/P EST LOW 20 MIN: CPT | Performed by: INTERNAL MEDICINE

## 2019-12-04 NOTE — PROGRESS NOTES
GIOVANI@  Arun Painting is a 44 y.o. male.     Chief Complaint   Patient presents with   • Hypertension   • Heartburn   Ulcerative colitis, left elbow pain    History of Present Illness   Patient here follow-up for hypertension, heartburn.  She also has ulcerative colitis.  Recent colonoscopy was okay.  Prilosec helping his heartburn.  Taking his blood pressure medication.  Blood pressure has been stable.  Complains of left elbow pain off and on for several months.  No numbness, tingling.  The following portions of the patient's history were reviewed and updated as appropriate: allergies, current medications, past family history, past medical history, past social history, past surgical history and problem list.    Review of Systems   Constitutional: Negative for activity change, appetite change, fatigue and fever.   Eyes: Negative for blurred vision and double vision.   Respiratory: Negative.  Negative for shortness of breath.    Cardiovascular: Negative for chest pain, palpitations and leg swelling.   Gastrointestinal: Negative.    Musculoskeletal:        Left elbow pain   Neurological: Negative for dizziness, syncope, light-headedness and headache.       Allergies   Allergen Reactions   • Asacol [Mesalamine] Rash       Current Outpatient Medications on File Prior to Visit   Medication Sig Dispense Refill   • amLODIPine (NORVASC) 5 MG tablet TAKE ONE TABLET BY MOUTH DAILY 30 tablet 5   • APRISO 0.375 g 24 hr capsule TAKE ONE CAPSULE BY MOUTH FOUR TIMES A  capsule 4   • omeprazole (priLOSEC) 20 MG capsule TAKE TWO CAPSULES BY MOUTH DAILY 180 capsule 3   • meloxicam (MOBIC) 15 MG tablet Take 1 tablet by mouth Daily As Needed for Moderate Pain . Take as directed with food. 30 tablet 2     No current facility-administered medications on file prior to visit.        Family History   Problem Relation Age of Onset   • Colon polyps Mother    • Colon cancer Neg Hx        Past Medical History:   Diagnosis  "Date   • Arthritis     right shoulder   • Esophagitis    • Gastritis    • GERD (gastroesophageal reflux disease)    • Hiatal hernia    • Hypertension    • Ulcerative colitis (CMS/HCC)        Past Surgical History:   Procedure Laterality Date   • COLONOSCOPY  12/18/2013    stool, mild inflammation, mild-moderate chronic colitis w/crypt abscess    • COLONOSCOPY N/A 10/11/2017    Procedure: COLONOSCOPY  w/ biopsies; polypectomy;  Surgeon: Arlyn Cam MD;  Location: [facility] OR;  Service:    • COLONOSCOPY N/A 10/30/2019    Procedure: COLONOSCOPY with biopsies;  Surgeon: Arlyn Cam MD;  Location: [facility] OR;  Service: Gastroenterology   • UPPER GASTROINTESTINAL ENDOSCOPY  08/07/2013    z line irregular, 38 cm from incisors, HH, gastritis, active esophagitis w/changes suggestive of an adjacent erosion, no h-pylorino Curran's       Social History     Socioeconomic History   • Marital status:      Spouse name: Not on file   • Number of children: Not on file   • Years of education: Not on file   • Highest education level: Not on file   Tobacco Use   • Smoking status: Never Smoker   • Smokeless tobacco: Never Used   Substance and Sexual Activity   • Alcohol use: Yes     Alcohol/week: 3.6 oz     Types: 6 Cans of beer per week     Comment: weekly   • Drug use: No   • Sexual activity: Defer       Patient Active Problem List   Diagnosis   • Ulcerative colitis without complications (CMS/HCC)   • Hypertension   • Atheroma, skin   • Mixed hyperlipidemia   • Personal history of colonic polyps   • Family history of polyps in the colon       /72 (BP Location: Left arm, Patient Position: Sitting, Cuff Size: Adult)   Pulse 76   Temp 97.7 °F (36.5 °C) (Oral)   Resp 14   Ht 177.8 cm (70\")   Wt 109 kg (240 lb)   SpO2 98%   BMI 34.44 kg/m²   Body mass index is 34.44 kg/m².    Objective   Physical Exam   Constitutional: He appears well-developed and well-nourished.   HENT:   Head: Normocephalic and atraumatic. "   Eyes: EOM are normal. Pupils are equal, round, and reactive to light.   Neck: Normal range of motion. Neck supple. No JVD present. No tracheal deviation present. No thyromegaly present.   Cardiovascular: Normal rate, regular rhythm, normal heart sounds and intact distal pulses.   No murmur heard.  Pulmonary/Chest: Effort normal and breath sounds normal. No stridor. No respiratory distress. He has no wheezes.   Abdominal: Soft. Bowel sounds are normal. He exhibits no distension and no mass. There is no tenderness. There is no guarding.   Musculoskeletal: Normal range of motion. He exhibits tenderness. He exhibits no edema or deformity.   Left elbow positive tender laterally to palpation.  Range of motion intact.   Lymphadenopathy:     He has no cervical adenopathy.   Nursing note and vitals reviewed.        Assessment/Plan   Arun was seen today for hypertension and heartburn.    Diagnoses and all orders for this visit:    Essential hypertension  -     Comprehensive Metabolic Panel  -     Lipid Panel With / Chol / HDL Ratio    Mixed hyperlipidemia  -     Comprehensive Metabolic Panel  -     Lipid Panel With / Chol / HDL Ratio    Ulcerative colitis without complications, unspecified location (CMS/HCC)    Left elbow tendinitis    Continue current medication.  Diet and exercise.  Return in 6 months time.  All his problems are chronic and stable.  For the left elbow tendinitis is recommended elbow brace or ring.  Patient states orthopedic is going to call make an appointment.  May need cortisone injection.

## 2019-12-05 LAB
ALBUMIN SERPL-MCNC: 4.4 G/DL (ref 3.5–5.2)
ALBUMIN/GLOB SERPL: 1.5 G/DL
ALP SERPL-CCNC: 68 U/L (ref 39–117)
ALT SERPL-CCNC: 22 U/L (ref 1–41)
AST SERPL-CCNC: 18 U/L (ref 1–40)
BILIRUB SERPL-MCNC: 0.4 MG/DL (ref 0.2–1.2)
BUN SERPL-MCNC: 9 MG/DL (ref 6–20)
BUN/CREAT SERPL: 11.5 (ref 7–25)
CALCIUM SERPL-MCNC: 9.2 MG/DL (ref 8.6–10.5)
CHLORIDE SERPL-SCNC: 105 MMOL/L (ref 98–107)
CHOLEST SERPL-MCNC: 209 MG/DL (ref 0–200)
CHOLEST/HDLC SERPL: 4.64 {RATIO}
CO2 SERPL-SCNC: 25.3 MMOL/L (ref 22–29)
CREAT SERPL-MCNC: 0.78 MG/DL (ref 0.76–1.27)
GLOBULIN SER CALC-MCNC: 2.9 GM/DL
GLUCOSE SERPL-MCNC: 85 MG/DL (ref 65–99)
HDLC SERPL-MCNC: 45 MG/DL (ref 40–60)
LDLC SERPL CALC-MCNC: 110 MG/DL (ref 0–100)
POTASSIUM SERPL-SCNC: 3.9 MMOL/L (ref 3.5–5.2)
PROT SERPL-MCNC: 7.3 G/DL (ref 6–8.5)
SODIUM SERPL-SCNC: 142 MMOL/L (ref 136–145)
TRIGL SERPL-MCNC: 271 MG/DL (ref 0–150)
VLDLC SERPL CALC-MCNC: 54.2 MG/DL

## 2019-12-16 RX ORDER — COLCHICINE 0.6 MG/1
0.6 TABLET ORAL 2 TIMES DAILY
Qty: 20 TABLET | Refills: 0 | Status: SHIPPED | OUTPATIENT
Start: 2019-12-16 | End: 2020-02-24

## 2020-02-24 RX ORDER — COLCHICINE 0.6 MG/1
TABLET ORAL
Qty: 20 TABLET | Refills: 2 | Status: SHIPPED | OUTPATIENT
Start: 2020-02-24 | End: 2020-09-08

## 2020-03-19 RX ORDER — AMLODIPINE BESYLATE 5 MG/1
TABLET ORAL
Qty: 30 TABLET | Refills: 5 | Status: SHIPPED | OUTPATIENT
Start: 2020-03-19 | End: 2020-10-29 | Stop reason: SDUPTHER

## 2020-05-06 RX ORDER — OMEPRAZOLE 40 MG/1
40 CAPSULE, DELAYED RELEASE ORAL DAILY
Qty: 90 CAPSULE | Refills: 3 | Status: SHIPPED | OUTPATIENT
Start: 2020-05-06 | End: 2021-07-27

## 2020-06-03 ENCOUNTER — OFFICE VISIT (OUTPATIENT)
Dept: FAMILY MEDICINE CLINIC | Facility: CLINIC | Age: 45
End: 2020-06-03

## 2020-06-03 VITALS
HEIGHT: 70 IN | OXYGEN SATURATION: 98 % | WEIGHT: 236 LBS | HEART RATE: 72 BPM | SYSTOLIC BLOOD PRESSURE: 128 MMHG | BODY MASS INDEX: 33.79 KG/M2 | DIASTOLIC BLOOD PRESSURE: 82 MMHG | TEMPERATURE: 98.2 F

## 2020-06-03 DIAGNOSIS — K51.90 ULCERATIVE COLITIS WITHOUT COMPLICATIONS, UNSPECIFIED LOCATION (HCC): ICD-10-CM

## 2020-06-03 DIAGNOSIS — R07.89 ATYPICAL CHEST PAIN: ICD-10-CM

## 2020-06-03 DIAGNOSIS — E78.2 MIXED HYPERLIPIDEMIA: ICD-10-CM

## 2020-06-03 DIAGNOSIS — I10 ESSENTIAL HYPERTENSION: Primary | ICD-10-CM

## 2020-06-03 PROCEDURE — 99214 OFFICE O/P EST MOD 30 MIN: CPT | Performed by: INTERNAL MEDICINE

## 2020-06-03 NOTE — PROGRESS NOTES
SUBJECTIVEBEGIN@  Arun Painting is a 44 y.o. male.     Chief Complaint   Patient presents with   • Arthritis   • Heartburn   • Hypertension   Atypical chest pain    History of Present Illness   Patient here for follow-up on the hypertension, hyperlipidemia.  Taking Norvasc without any problems.  He is taking meloxicam on and off.  He has ulcerative colitis seen by GI.  He is on medications.  Diet and exercise.  Reports he had episode of chest pain 2 months ago had some shortness of breath.  Did not seek any medical attention.  His daughter who is a EMT did not EKG it was okay.  Pain resolved on his own.  No further symptoms since.  His mother had history of CAD.  GERD symptoms are under control with Prilosec.  The following portions of the patient's history were reviewed and updated as appropriate: allergies, current medications, past family history, past medical history, past social history, past surgical history and problem list.    Review of Systems   Constitutional: Negative for activity change, appetite change, fatigue and fever.   Eyes: Negative for blurred vision and double vision.   Respiratory: Negative.  Negative for shortness of breath.    Cardiovascular: Positive for chest pain. Negative for palpitations and leg swelling.   Gastrointestinal: Negative.    Genitourinary: Negative for hematuria.   Neurological: Negative.  Negative for dizziness, syncope and light-headedness.       Allergies   Allergen Reactions   • Asacol [Mesalamine] Rash       Current Outpatient Medications on File Prior to Visit   Medication Sig Dispense Refill   • amLODIPine (NORVASC) 5 MG tablet TAKE ONE TABLET BY MOUTH DAILY 30 tablet 5   • APRISO 0.375 g 24 hr capsule TAKE ONE CAPSULE BY MOUTH FOUR TIMES A  capsule 4   • omeprazole (priLOSEC) 40 MG capsule Take 1 capsule by mouth Daily. 90 capsule 3   • colchicine 0.6 MG tablet TAKE ONE TABLET BY MOUTH TWICE A DAY 20 tablet 2   • meloxicam (MOBIC) 15 MG tablet Take 1  tablet by mouth Daily As Needed for Moderate Pain . Take as directed with food. 30 tablet 2     No current facility-administered medications on file prior to visit.        Family History   Problem Relation Age of Onset   • Colon polyps Mother    • Colon cancer Neg Hx        Past Medical History:   Diagnosis Date   • Arthritis     right shoulder   • Esophagitis    • Gastritis    • GERD (gastroesophageal reflux disease)    • Hiatal hernia    • Hypertension    • Ulcerative colitis (CMS/HCC)        Past Surgical History:   Procedure Laterality Date   • COLONOSCOPY  12/18/2013    stool, mild inflammation, mild-moderate chronic colitis w/crypt abscess    • COLONOSCOPY N/A 10/11/2017    Procedure: COLONOSCOPY  w/ biopsies; polypectomy;  Surgeon: Arlyn Cam MD;  Location: [facility];  Service:    • COLONOSCOPY N/A 10/30/2019    Procedure: COLONOSCOPY with biopsies;  Surgeon: Arlyn Cam MD;  Location: [facility];  Service: Gastroenterology   • UPPER GASTROINTESTINAL ENDOSCOPY  08/07/2013    z line irregular, 38 cm from incisors, HH, gastritis, active esophagitis w/changes suggestive of an adjacent erosion, no h-pylorino Curran's       Social History     Socioeconomic History   • Marital status:      Spouse name: Not on file   • Number of children: Not on file   • Years of education: Not on file   • Highest education level: Not on file   Tobacco Use   • Smoking status: Never Smoker   • Smokeless tobacco: Never Used   Substance and Sexual Activity   • Alcohol use: Yes     Alcohol/week: 6.0 standard drinks     Types: 6 Cans of beer per week     Comment: weekly   • Drug use: No   • Sexual activity: Defer       Patient Active Problem List   Diagnosis   • Ulcerative colitis without complications (CMS/HCC)   • Hypertension   • Atheroma, skin   • Mixed hyperlipidemia   • Personal history of colonic polyps   • Family history of polyps in the colon       /82 (BP Location: Right arm, Patient Position: Sitting, Cuff  "Size: Adult)   Pulse 72   Temp 98.2 °F (36.8 °C)   Ht 177.8 cm (70\")   Wt 107 kg (236 lb)   SpO2 98%   BMI 33.86 kg/m²   Body mass index is 33.86 kg/m².    Objective   Physical Exam   Constitutional: He is oriented to person, place, and time. He appears well-developed.   Eyes: Pupils are equal, round, and reactive to light.   Neck: Normal range of motion. Neck supple. No JVD present. No tracheal deviation present. No thyromegaly present.   Cardiovascular: Normal rate, regular rhythm and normal heart sounds. Exam reveals no gallop and no friction rub.   No murmur heard.  Pulmonary/Chest: Effort normal and breath sounds normal. No respiratory distress. He has no wheezes.   Abdominal: Soft. Bowel sounds are normal. He exhibits no distension and no mass. There is no tenderness. There is no rebound and no guarding. No hernia.   Musculoskeletal: Normal range of motion.   Lymphadenopathy:     He has no cervical adenopathy.   Neurological: He is alert and oriented to person, place, and time. He displays normal reflexes. No cranial nerve deficit or sensory deficit. He exhibits normal muscle tone. Coordination normal.   Psychiatric: He has a normal mood and affect. His behavior is normal.   Nursing note and vitals reviewed.        Assessment/Plan   Arun was seen today for arthritis, heartburn and hypertension.    Diagnoses and all orders for this visit:    Essential hypertension  -     CBC & Differential  -     Comprehensive Metabolic Panel  -     Lipid Panel With / Chol / HDL Ratio  -     XR Chest 2 View; Future  -     ECG 12 Lead    Mixed hyperlipidemia  -     CBC & Differential  -     Comprehensive Metabolic Panel  -     Lipid Panel With / Chol / HDL Ratio    Ulcerative colitis without complications, unspecified location (CMS/HCC)  -     CBC & Differential  -     Comprehensive Metabolic Panel  -     Lipid Panel With / Chol / HDL Ratio    Atypical chest pain  -     XR Chest 2 View; Future  -     ECG 12 " Lead    Discussed with patient if he has another episode of pain like that he needs to proceed to the ER.  There could be several reasons he could have had chest pain.  Continue diet and exercise.  Continue Norvasc.  Check blood pressure at home.  Except for the chest pain rest of the problems are chronic and stable.  Chest pain has not reoccurred.  Continue Prilosec helping his GERD symptoms.

## 2020-09-08 RX ORDER — COLCHICINE 0.6 MG/1
TABLET ORAL
Qty: 20 TABLET | Refills: 0 | Status: SHIPPED | OUTPATIENT
Start: 2020-09-08 | End: 2022-04-12 | Stop reason: SDUPTHER

## 2020-10-29 RX ORDER — AMLODIPINE BESYLATE 5 MG/1
5 TABLET ORAL DAILY
Qty: 30 TABLET | Refills: 5 | Status: SHIPPED | OUTPATIENT
Start: 2020-10-29 | End: 2021-06-24

## 2020-11-11 RX ORDER — MESALAMINE 0.38 G/1
0.38 CAPSULE, EXTENDED RELEASE ORAL 4 TIMES DAILY
Qty: 360 CAPSULE | Refills: 3 | Status: SHIPPED | OUTPATIENT
Start: 2020-11-11 | End: 2022-02-24

## 2020-12-16 ENCOUNTER — OFFICE VISIT (OUTPATIENT)
Dept: FAMILY MEDICINE CLINIC | Facility: CLINIC | Age: 45
End: 2020-12-16

## 2020-12-16 VITALS
BODY MASS INDEX: 34.22 KG/M2 | HEART RATE: 80 BPM | DIASTOLIC BLOOD PRESSURE: 82 MMHG | TEMPERATURE: 96.9 F | OXYGEN SATURATION: 98 % | WEIGHT: 239 LBS | SYSTOLIC BLOOD PRESSURE: 128 MMHG | HEIGHT: 70 IN

## 2020-12-16 DIAGNOSIS — E78.2 MIXED HYPERLIPIDEMIA: ICD-10-CM

## 2020-12-16 DIAGNOSIS — K51.90 ULCERATIVE COLITIS WITHOUT COMPLICATIONS, UNSPECIFIED LOCATION (HCC): ICD-10-CM

## 2020-12-16 DIAGNOSIS — I10 ESSENTIAL HYPERTENSION: Primary | ICD-10-CM

## 2020-12-16 PROCEDURE — 99213 OFFICE O/P EST LOW 20 MIN: CPT | Performed by: INTERNAL MEDICINE

## 2020-12-16 NOTE — PROGRESS NOTES
GIOVANI@  Arun Painting is a 45 y.o. male.     Chief Complaint   Patient presents with   • Hypertension     6 MONTH CHECK-UP   • Hyperlipidemia   • ULCERATIVE COLITIS       History of Present Illness   Here follow-up for hypertension, hyperlipidemia.  He is taking his Norvasc checking blood pressure at home is been okay.  He is also on Prilosec for GERD symptoms.  He has ulcerative colitis, he had a colonoscopy last year.  He follows with Dr. Moore now with Dr. Sinclair.  Reports he scheduled for a colonoscopy next year.  He is dieting and exercising.  The following portions of the patient's history were reviewed and updated as appropriate: allergies, current medications, past family history, past medical history, past social history, past surgical history and problem list.    Review of Systems   Constitutional: Negative for activity change, appetite change, fatigue and fever.   Eyes: Negative for blurred vision and double vision.   Respiratory: Negative.  Negative for shortness of breath.    Cardiovascular: Negative for chest pain, palpitations and leg swelling.   Gastrointestinal: Negative.    Neurological: Negative for dizziness, syncope, light-headedness and headache.       Allergies   Allergen Reactions   • Asacol [Mesalamine] Rash       Current Outpatient Medications on File Prior to Visit   Medication Sig Dispense Refill   • amLODIPine (NORVASC) 5 MG tablet Take 1 tablet by mouth Daily. 30 tablet 5   • colchicine 0.6 MG tablet TAKE ONE TABLET BY MOUTH TWICE A DAY 20 tablet 0   • meloxicam (MOBIC) 15 MG tablet Take 1 tablet by mouth Daily As Needed for Moderate Pain . Take as directed with food. 30 tablet 2   • mesalamine (Apriso) 0.375 g 24 hr capsule Take 1 capsule by mouth 4 (Four) Times a Day. 360 capsule 3   • omeprazole (priLOSEC) 40 MG capsule Take 1 capsule by mouth Daily. 90 capsule 3     No current facility-administered medications on file prior to visit.        Family History  "  Problem Relation Age of Onset   • Colon polyps Mother    • Colon cancer Neg Hx        Past Medical History:   Diagnosis Date   • Arthritis     right shoulder   • Esophagitis    • Gastritis    • GERD (gastroesophageal reflux disease)    • Hiatal hernia    • Hypertension    • Ulcerative colitis (CMS/HCC)        Past Surgical History:   Procedure Laterality Date   • COLONOSCOPY  12/18/2013    stool, mild inflammation, mild-moderate chronic colitis w/crypt abscess    • COLONOSCOPY N/A 10/11/2017    Procedure: COLONOSCOPY  w/ biopsies; polypectomy;  Surgeon: Arlyn Cam MD;  Location: [facility] OR;  Service:    • COLONOSCOPY N/A 10/30/2019    Procedure: COLONOSCOPY with biopsies;  Surgeon: Arlyn Cam MD;  Location: [facility];  Service: Gastroenterology   • UPPER GASTROINTESTINAL ENDOSCOPY  08/07/2013    z line irregular, 38 cm from incisors, HH, gastritis, active esophagitis w/changes suggestive of an adjacent erosion, no h-pylorino Curran's       Social History     Socioeconomic History   • Marital status:      Spouse name: Not on file   • Number of children: Not on file   • Years of education: Not on file   • Highest education level: Not on file   Tobacco Use   • Smoking status: Never Smoker   • Smokeless tobacco: Never Used   Substance and Sexual Activity   • Alcohol use: Yes     Alcohol/week: 6.0 standard drinks     Types: 6 Cans of beer per week     Comment: weekly   • Drug use: No   • Sexual activity: Defer       Patient Active Problem List   Diagnosis   • Ulcerative colitis without complications (CMS/HCC)   • Hypertension   • Atheroma, skin   • Mixed hyperlipidemia   • Personal history of colonic polyps   • Family history of polyps in the colon       /82 (BP Location: Right arm, Patient Position: Sitting, Cuff Size: Adult)   Pulse 80   Temp 96.9 °F (36.1 °C) (Temporal)   Ht 177.8 cm (70\")   Wt 108 kg (239 lb)   SpO2 98%   BMI 34.29 kg/m²   Body mass index is 34.29 kg/m².    Objective "   Physical Exam  Constitutional:       Appearance: He is well-developed.   Eyes:      Pupils: Pupils are equal, round, and reactive to light.   Neck:      Musculoskeletal: Normal range of motion and neck supple.      Thyroid: No thyromegaly.      Vascular: No JVD.      Trachea: No tracheal deviation.   Cardiovascular:      Rate and Rhythm: Normal rate and regular rhythm.      Heart sounds: Normal heart sounds. No murmur. No friction rub. No gallop.    Pulmonary:      Effort: Pulmonary effort is normal. No respiratory distress.      Breath sounds: Normal breath sounds. No wheezing.   Abdominal:      General: Bowel sounds are normal. There is no distension.      Palpations: Abdomen is soft. There is no mass.      Tenderness: There is no abdominal tenderness. There is no guarding or rebound.      Hernia: No hernia is present.   Musculoskeletal: Normal range of motion.   Lymphadenopathy:      Cervical: No cervical adenopathy.   Neurological:      Mental Status: He is alert and oriented to person, place, and time.   Psychiatric:         Behavior: Behavior normal.           Assessment/Plan   Diagnoses and all orders for this visit:    1. Essential hypertension (Primary)  -     CBC & Differential  -     Comprehensive Metabolic Panel  -     Lipid Panel With / Chol / HDL Ratio  -     TSH    2. Mixed hyperlipidemia  -     CBC & Differential  -     Comprehensive Metabolic Panel  -     Lipid Panel With / Chol / HDL Ratio  -     TSH    3. Ulcerative colitis without complications, unspecified location (CMS/HCC)  -     CBC & Differential  -     Comprehensive Metabolic Panel  -     Lipid Panel With / Chol / HDL Ratio  -     TSH    Continue diet and exercise.  Continue all current medication.  Check blood pressure at home.  Discussed with him to stop taking meloxicam daily, he reports that she takes very occasionally.  Keep follow-up with GI.  All his problems are chronic and stable.  Return in 6 months

## 2020-12-17 LAB
ALBUMIN SERPL-MCNC: 4.3 G/DL (ref 3.5–5.2)
ALBUMIN/GLOB SERPL: 1.5 G/DL
ALP SERPL-CCNC: 99 U/L (ref 39–117)
ALT SERPL-CCNC: 26 U/L (ref 1–41)
AST SERPL-CCNC: 18 U/L (ref 1–40)
BASOPHILS # BLD AUTO: 0.03 10*3/MM3 (ref 0–0.2)
BASOPHILS NFR BLD AUTO: 0.5 % (ref 0–1.5)
BILIRUB SERPL-MCNC: 0.6 MG/DL (ref 0–1.2)
BUN SERPL-MCNC: 9 MG/DL (ref 6–20)
BUN/CREAT SERPL: 11.1 (ref 7–25)
CALCIUM SERPL-MCNC: 9.5 MG/DL (ref 8.6–10.5)
CHLORIDE SERPL-SCNC: 104 MMOL/L (ref 98–107)
CHOLEST SERPL-MCNC: 170 MG/DL (ref 0–200)
CHOLEST/HDLC SERPL: 4.15 {RATIO}
CO2 SERPL-SCNC: 26.1 MMOL/L (ref 22–29)
CREAT SERPL-MCNC: 0.81 MG/DL (ref 0.76–1.27)
EOSINOPHIL # BLD AUTO: 0.13 10*3/MM3 (ref 0–0.4)
EOSINOPHIL NFR BLD AUTO: 2.1 % (ref 0.3–6.2)
ERYTHROCYTE [DISTWIDTH] IN BLOOD BY AUTOMATED COUNT: 12.1 % (ref 12.3–15.4)
GLOBULIN SER CALC-MCNC: 2.9 GM/DL
GLUCOSE SERPL-MCNC: 84 MG/DL (ref 65–99)
HCT VFR BLD AUTO: 41.7 % (ref 37.5–51)
HDLC SERPL-MCNC: 41 MG/DL (ref 40–60)
HGB BLD-MCNC: 14.4 G/DL (ref 13–17.7)
IMM GRANULOCYTES # BLD AUTO: 0.01 10*3/MM3 (ref 0–0.05)
IMM GRANULOCYTES NFR BLD AUTO: 0.2 % (ref 0–0.5)
LDLC SERPL CALC-MCNC: 109 MG/DL (ref 0–100)
LYMPHOCYTES # BLD AUTO: 2.16 10*3/MM3 (ref 0.7–3.1)
LYMPHOCYTES NFR BLD AUTO: 34.4 % (ref 19.6–45.3)
MCH RBC QN AUTO: 29.7 PG (ref 26.6–33)
MCHC RBC AUTO-ENTMCNC: 34.5 G/DL (ref 31.5–35.7)
MCV RBC AUTO: 86 FL (ref 79–97)
MONOCYTES # BLD AUTO: 0.56 10*3/MM3 (ref 0.1–0.9)
MONOCYTES NFR BLD AUTO: 8.9 % (ref 5–12)
NEUTROPHILS # BLD AUTO: 3.39 10*3/MM3 (ref 1.7–7)
NEUTROPHILS NFR BLD AUTO: 53.9 % (ref 42.7–76)
NRBC BLD AUTO-RTO: 0 /100 WBC (ref 0–0.2)
PLATELET # BLD AUTO: 218 10*3/MM3 (ref 140–450)
POTASSIUM SERPL-SCNC: 3.9 MMOL/L (ref 3.5–5.2)
PROT SERPL-MCNC: 7.2 G/DL (ref 6–8.5)
RBC # BLD AUTO: 4.85 10*6/MM3 (ref 4.14–5.8)
SODIUM SERPL-SCNC: 139 MMOL/L (ref 136–145)
TRIGL SERPL-MCNC: 109 MG/DL (ref 0–150)
TSH SERPL DL<=0.005 MIU/L-ACNC: 0.68 UIU/ML (ref 0.27–4.2)
VLDLC SERPL CALC-MCNC: 20 MG/DL (ref 5–40)
WBC # BLD AUTO: 6.28 10*3/MM3 (ref 3.4–10.8)

## 2021-06-24 RX ORDER — AMLODIPINE BESYLATE 5 MG/1
TABLET ORAL
Qty: 30 TABLET | Refills: 1 | Status: SHIPPED | OUTPATIENT
Start: 2021-06-24 | End: 2021-06-28

## 2021-06-28 RX ORDER — AMLODIPINE BESYLATE 5 MG/1
TABLET ORAL
Qty: 30 TABLET | Refills: 0 | Status: SHIPPED | OUTPATIENT
Start: 2021-06-28 | End: 2021-06-29 | Stop reason: SDUPTHER

## 2021-06-29 RX ORDER — AMLODIPINE BESYLATE 5 MG/1
5 TABLET ORAL DAILY
Qty: 30 TABLET | Refills: 0 | Status: SHIPPED | OUTPATIENT
Start: 2021-06-29 | End: 2021-12-08

## 2021-06-30 ENCOUNTER — OFFICE VISIT (OUTPATIENT)
Dept: FAMILY MEDICINE CLINIC | Facility: CLINIC | Age: 46
End: 2021-06-30

## 2021-06-30 VITALS
DIASTOLIC BLOOD PRESSURE: 80 MMHG | WEIGHT: 232 LBS | HEART RATE: 79 BPM | HEIGHT: 70 IN | TEMPERATURE: 98 F | OXYGEN SATURATION: 98 % | SYSTOLIC BLOOD PRESSURE: 138 MMHG | RESPIRATION RATE: 16 BRPM | BODY MASS INDEX: 33.21 KG/M2

## 2021-06-30 DIAGNOSIS — I10 ESSENTIAL HYPERTENSION: Primary | ICD-10-CM

## 2021-06-30 DIAGNOSIS — K51.90 ULCERATIVE COLITIS WITHOUT COMPLICATIONS, UNSPECIFIED LOCATION (HCC): ICD-10-CM

## 2021-06-30 DIAGNOSIS — M1A.0710 IDIOPATHIC CHRONIC GOUT OF RIGHT FOOT WITHOUT TOPHUS: ICD-10-CM

## 2021-06-30 PROCEDURE — 99213 OFFICE O/P EST LOW 20 MIN: CPT | Performed by: INTERNAL MEDICINE

## 2021-06-30 NOTE — PROGRESS NOTES
Subjective   Arun Painting is a 45 y.o. male.     Chief Complaint   Patient presents with   • Hypertension     6 month f/u        History of Present Illness   Patient here follow-up for hypertension.  He is taking Norvasc regularly.  He states gastroenterology for ulcerative colitis.  Is scheduled for colonoscopy no chest pain shortness breath nausea vomiting abdominal pain.  No further gout attacks  The following portions of the patient's history were reviewed and updated as appropriate: allergies, current medications, past family history, past medical history, past social history, past surgical history and problem list.    Review of Systems   Constitutional: Negative for activity change, appetite change, fatigue and fever.   Eyes: Negative for blurred vision and double vision.   Respiratory: Negative.  Negative for shortness of breath.    Cardiovascular: Negative for chest pain, palpitations and leg swelling.   Gastrointestinal: Negative.    Neurological: Negative for dizziness, syncope, light-headedness and headache.       Allergies   Allergen Reactions   • Asacol [Mesalamine] Rash       Current Outpatient Medications on File Prior to Visit   Medication Sig Dispense Refill   • amLODIPine (NORVASC) 5 MG tablet Take 1 tablet by mouth Daily. 30 tablet 0   • mesalamine (Apriso) 0.375 g 24 hr capsule Take 1 capsule by mouth 4 (Four) Times a Day. 360 capsule 3   • omeprazole (priLOSEC) 40 MG capsule Take 1 capsule by mouth Daily. 90 capsule 3   • colchicine 0.6 MG tablet TAKE ONE TABLET BY MOUTH TWICE A DAY 20 tablet 0   • meloxicam (MOBIC) 15 MG tablet Take 1 tablet by mouth Daily As Needed for Moderate Pain . Take as directed with food. 30 tablet 2     No current facility-administered medications on file prior to visit.       Family History   Problem Relation Age of Onset   • Colon polyps Mother    • Colon cancer Neg Hx        Past Medical History:   Diagnosis Date   • Arthritis     right shoulder   • Esophagitis  "   • Gastritis    • GERD (gastroesophageal reflux disease)    • Hiatal hernia    • Hypertension    • Ulcerative colitis (CMS/HCC)        Past Surgical History:   Procedure Laterality Date   • COLONOSCOPY  12/18/2013    stool, mild inflammation, mild-moderate chronic colitis w/crypt abscess    • COLONOSCOPY N/A 10/11/2017    Procedure: COLONOSCOPY  w/ biopsies; polypectomy;  Surgeon: Arlyn Cam MD;  Location: [facility];  Service:    • COLONOSCOPY N/A 10/30/2019    Procedure: COLONOSCOPY with biopsies;  Surgeon: Arlyn Cam MD;  Location: [facility] OR;  Service: Gastroenterology   • UPPER GASTROINTESTINAL ENDOSCOPY  08/07/2013    z line irregular, 38 cm from incisors, HH, gastritis, active esophagitis w/changes suggestive of an adjacent erosion, no h-pylorino Curran's       Social History     Socioeconomic History   • Marital status:      Spouse name: Not on file   • Number of children: Not on file   • Years of education: Not on file   • Highest education level: Not on file   Tobacco Use   • Smoking status: Never Smoker   • Smokeless tobacco: Never Used   Substance and Sexual Activity   • Alcohol use: Yes     Alcohol/week: 6.0 standard drinks     Types: 6 Cans of beer per week     Comment: weekly   • Drug use: No   • Sexual activity: Defer       Patient Active Problem List   Diagnosis   • Ulcerative colitis without complications (CMS/HCC)   • Hypertension   • Atheroma, skin   • Mixed hyperlipidemia   • Personal history of colonic polyps   • Family history of polyps in the colon   • Idiopathic chronic gout of right foot       /80   Pulse 79   Temp 98 °F (36.7 °C)   Resp 16   Ht 177.8 cm (70\")   Wt 105 kg (232 lb)   SpO2 98%   BMI 33.29 kg/m²   Body mass index is 33.29 kg/m².    Objective   Physical Exam  Vitals and nursing note reviewed.   Constitutional:       Appearance: He is well-developed.   Eyes:      Pupils: Pupils are equal, round, and reactive to light.   Neck:      Thyroid: No " thyromegaly.      Vascular: No JVD.      Trachea: No tracheal deviation.   Cardiovascular:      Rate and Rhythm: Normal rate and regular rhythm.      Heart sounds: Normal heart sounds. No murmur heard.   No friction rub. No gallop.    Pulmonary:      Effort: Pulmonary effort is normal. No respiratory distress.      Breath sounds: Normal breath sounds. No wheezing.   Abdominal:      General: Bowel sounds are normal. There is no distension.      Palpations: Abdomen is soft. There is no mass.      Tenderness: There is no abdominal tenderness. There is no guarding or rebound.      Hernia: No hernia is present.   Musculoskeletal:         General: Normal range of motion.      Cervical back: Normal range of motion and neck supple.   Lymphadenopathy:      Cervical: No cervical adenopathy.   Neurological:      General: No focal deficit present.      Mental Status: He is alert and oriented to person, place, and time. Mental status is at baseline.   Psychiatric:         Behavior: Behavior normal.           Assessment/Plan   Diagnoses and all orders for this visit:    1. Essential hypertension (Primary)  -     CBC & Differential  -     Comprehensive Metabolic Panel  -     Lipid Panel With / Chol / HDL Ratio  -     Uric Acid    2. Ulcerative colitis without complications, unspecified location (CMS/HCC)  -     CBC & Differential  -     Comprehensive Metabolic Panel  -     Lipid Panel With / Chol / HDL Ratio  -     Uric Acid    3. Idiopathic chronic gout of right foot without tophus  -     CBC & Differential  -     Comprehensive Metabolic Panel  -     Lipid Panel With / Chol / HDL Ratio  -     Uric Acid    Continue diet and exercise.  Continue Norvasc.  Keep follow-up with gastroenterology.  Return in 6 months time.  All his problems are chronic and stable.  No problems with gout recently.  Return in 6 months time.

## 2021-07-01 LAB
ALBUMIN SERPL-MCNC: 4.3 G/DL (ref 3.5–5.2)
ALBUMIN/GLOB SERPL: 1.6 G/DL
ALP SERPL-CCNC: 94 U/L (ref 39–117)
ALT SERPL-CCNC: 18 U/L (ref 1–41)
AST SERPL-CCNC: 18 U/L (ref 1–40)
BASOPHILS # BLD AUTO: 0.04 10*3/MM3 (ref 0–0.2)
BASOPHILS NFR BLD AUTO: 0.5 % (ref 0–1.5)
BILIRUB SERPL-MCNC: 0.9 MG/DL (ref 0–1.2)
BUN SERPL-MCNC: 9 MG/DL (ref 6–20)
BUN/CREAT SERPL: 11 (ref 7–25)
CALCIUM SERPL-MCNC: 9.2 MG/DL (ref 8.6–10.5)
CHLORIDE SERPL-SCNC: 103 MMOL/L (ref 98–107)
CHOLEST SERPL-MCNC: 168 MG/DL (ref 0–200)
CHOLEST/HDLC SERPL: 4.8 {RATIO}
CO2 SERPL-SCNC: 23.2 MMOL/L (ref 22–29)
CREAT SERPL-MCNC: 0.82 MG/DL (ref 0.76–1.27)
EOSINOPHIL # BLD AUTO: 0.16 10*3/MM3 (ref 0–0.4)
EOSINOPHIL NFR BLD AUTO: 1.9 % (ref 0.3–6.2)
ERYTHROCYTE [DISTWIDTH] IN BLOOD BY AUTOMATED COUNT: 12 % (ref 12.3–15.4)
GLOBULIN SER CALC-MCNC: 2.7 GM/DL
GLUCOSE SERPL-MCNC: 85 MG/DL (ref 65–99)
HCT VFR BLD AUTO: 38.6 % (ref 37.5–51)
HDLC SERPL-MCNC: 35 MG/DL (ref 40–60)
HGB BLD-MCNC: 13.1 G/DL (ref 13–17.7)
IMM GRANULOCYTES # BLD AUTO: 0.03 10*3/MM3 (ref 0–0.05)
IMM GRANULOCYTES NFR BLD AUTO: 0.4 % (ref 0–0.5)
LDLC SERPL CALC-MCNC: 104 MG/DL (ref 0–100)
LYMPHOCYTES # BLD AUTO: 2.18 10*3/MM3 (ref 0.7–3.1)
LYMPHOCYTES NFR BLD AUTO: 25.8 % (ref 19.6–45.3)
MCH RBC QN AUTO: 29.5 PG (ref 26.6–33)
MCHC RBC AUTO-ENTMCNC: 33.9 G/DL (ref 31.5–35.7)
MCV RBC AUTO: 86.9 FL (ref 79–97)
MONOCYTES # BLD AUTO: 0.95 10*3/MM3 (ref 0.1–0.9)
MONOCYTES NFR BLD AUTO: 11.2 % (ref 5–12)
NEUTROPHILS # BLD AUTO: 5.1 10*3/MM3 (ref 1.7–7)
NEUTROPHILS NFR BLD AUTO: 60.2 % (ref 42.7–76)
NRBC BLD AUTO-RTO: 0 /100 WBC (ref 0–0.2)
PLATELET # BLD AUTO: 228 10*3/MM3 (ref 140–450)
POTASSIUM SERPL-SCNC: 3.7 MMOL/L (ref 3.5–5.2)
PROT SERPL-MCNC: 7 G/DL (ref 6–8.5)
RBC # BLD AUTO: 4.44 10*6/MM3 (ref 4.14–5.8)
SODIUM SERPL-SCNC: 138 MMOL/L (ref 136–145)
TRIGL SERPL-MCNC: 164 MG/DL (ref 0–150)
URATE SERPL-MCNC: 9.5 MG/DL (ref 3.4–7)
VLDLC SERPL CALC-MCNC: 29 MG/DL (ref 5–40)
WBC # BLD AUTO: 8.46 10*3/MM3 (ref 3.4–10.8)

## 2021-07-27 RX ORDER — OMEPRAZOLE 40 MG/1
CAPSULE, DELAYED RELEASE ORAL
Qty: 90 CAPSULE | Refills: 3 | Status: SHIPPED | OUTPATIENT
Start: 2021-07-27 | End: 2022-10-13

## 2021-09-10 ENCOUNTER — TELEPHONE (OUTPATIENT)
Dept: GASTROENTEROLOGY | Facility: CLINIC | Age: 46
End: 2021-09-10

## 2021-09-10 NOTE — TELEPHONE ENCOUNTER
FAST TRACK - 2 YEAR RECALL - 10/2021 - LAST COLONOSCOPY 10/30/2019, DR. CESAR - PERSONAL HISTORY POLYPS - SCHEDULE  AT [place].

## 2021-10-20 ENCOUNTER — TELEPHONE (OUTPATIENT)
Dept: FAMILY MEDICINE CLINIC | Facility: CLINIC | Age: 46
End: 2021-10-20

## 2021-10-20 ENCOUNTER — OFFICE VISIT (OUTPATIENT)
Dept: FAMILY MEDICINE CLINIC | Facility: CLINIC | Age: 46
End: 2021-10-20

## 2021-10-20 ENCOUNTER — LAB (OUTPATIENT)
Dept: LAB | Facility: HOSPITAL | Age: 46
End: 2021-10-20

## 2021-10-20 VITALS
HEART RATE: 94 BPM | OXYGEN SATURATION: 97 % | BODY MASS INDEX: 34.07 KG/M2 | TEMPERATURE: 97.1 F | HEIGHT: 70 IN | WEIGHT: 238 LBS | DIASTOLIC BLOOD PRESSURE: 80 MMHG | SYSTOLIC BLOOD PRESSURE: 122 MMHG

## 2021-10-20 DIAGNOSIS — I10 PRIMARY HYPERTENSION: ICD-10-CM

## 2021-10-20 DIAGNOSIS — E78.2 MIXED HYPERLIPIDEMIA: ICD-10-CM

## 2021-10-20 DIAGNOSIS — R07.89 ATYPICAL CHEST PAIN: ICD-10-CM

## 2021-10-20 DIAGNOSIS — I10 PRIMARY HYPERTENSION: Primary | ICD-10-CM

## 2021-10-20 DIAGNOSIS — R06.00 DYSPNEA, UNSPECIFIED TYPE: ICD-10-CM

## 2021-10-20 LAB
ALBUMIN SERPL-MCNC: 4.1 G/DL (ref 3.5–5.2)
ALBUMIN/GLOB SERPL: 1.3 G/DL
ALP SERPL-CCNC: 137 U/L (ref 39–117)
ALT SERPL W P-5'-P-CCNC: 23 U/L (ref 1–41)
ANION GAP SERPL CALCULATED.3IONS-SCNC: 7.7 MMOL/L (ref 5–15)
AST SERPL-CCNC: 19 U/L (ref 1–40)
BASOPHILS # BLD AUTO: 0.03 10*3/MM3 (ref 0–0.2)
BASOPHILS NFR BLD AUTO: 0.4 % (ref 0–1.5)
BILIRUB SERPL-MCNC: 0.3 MG/DL (ref 0–1.2)
BUN SERPL-MCNC: 11 MG/DL (ref 6–20)
BUN/CREAT SERPL: 14.7 (ref 7–25)
CALCIUM SPEC-SCNC: 9.3 MG/DL (ref 8.6–10.5)
CHLORIDE SERPL-SCNC: 106 MMOL/L (ref 98–107)
CO2 SERPL-SCNC: 25.3 MMOL/L (ref 22–29)
CREAT SERPL-MCNC: 0.75 MG/DL (ref 0.76–1.27)
DEPRECATED RDW RBC AUTO: 40.3 FL (ref 37–54)
EOSINOPHIL # BLD AUTO: 0.15 10*3/MM3 (ref 0–0.4)
EOSINOPHIL NFR BLD AUTO: 1.8 % (ref 0.3–6.2)
ERYTHROCYTE [DISTWIDTH] IN BLOOD BY AUTOMATED COUNT: 12.2 % (ref 12.3–15.4)
GFR SERPL CREATININE-BSD FRML MDRD: 112 ML/MIN/1.73
GLOBULIN UR ELPH-MCNC: 3.1 GM/DL
GLUCOSE SERPL-MCNC: 98 MG/DL (ref 65–99)
HCT VFR BLD AUTO: 41.5 % (ref 37.5–51)
HGB BLD-MCNC: 13.7 G/DL (ref 13–17.7)
IMM GRANULOCYTES # BLD AUTO: 0.01 10*3/MM3 (ref 0–0.05)
IMM GRANULOCYTES NFR BLD AUTO: 0.1 % (ref 0–0.5)
LYMPHOCYTES # BLD AUTO: 2.25 10*3/MM3 (ref 0.7–3.1)
LYMPHOCYTES NFR BLD AUTO: 26.3 % (ref 19.6–45.3)
MCH RBC QN AUTO: 29.6 PG (ref 26.6–33)
MCHC RBC AUTO-ENTMCNC: 33 G/DL (ref 31.5–35.7)
MCV RBC AUTO: 89.6 FL (ref 79–97)
MONOCYTES # BLD AUTO: 0.78 10*3/MM3 (ref 0.1–0.9)
MONOCYTES NFR BLD AUTO: 9.1 % (ref 5–12)
NEUTROPHILS NFR BLD AUTO: 5.32 10*3/MM3 (ref 1.7–7)
NEUTROPHILS NFR BLD AUTO: 62.3 % (ref 42.7–76)
NRBC BLD AUTO-RTO: 0 /100 WBC (ref 0–0.2)
PLATELET # BLD AUTO: 203 10*3/MM3 (ref 140–450)
PMV BLD AUTO: 10.2 FL (ref 6–12)
POTASSIUM SERPL-SCNC: 3.8 MMOL/L (ref 3.5–5.2)
PROT SERPL-MCNC: 7.2 G/DL (ref 6–8.5)
RBC # BLD AUTO: 4.63 10*6/MM3 (ref 4.14–5.8)
SODIUM SERPL-SCNC: 139 MMOL/L (ref 136–145)
TROPONIN T SERPL-MCNC: <0.01 NG/ML (ref 0–0.03)
WBC # BLD AUTO: 8.54 10*3/MM3 (ref 3.4–10.8)

## 2021-10-20 PROCEDURE — 99214 OFFICE O/P EST MOD 30 MIN: CPT | Performed by: INTERNAL MEDICINE

## 2021-10-20 PROCEDURE — 80053 COMPREHEN METABOLIC PANEL: CPT

## 2021-10-20 PROCEDURE — 85025 COMPLETE CBC W/AUTO DIFF WBC: CPT

## 2021-10-20 PROCEDURE — 36415 COLL VENOUS BLD VENIPUNCTURE: CPT

## 2021-10-20 PROCEDURE — 84484 ASSAY OF TROPONIN QUANT: CPT

## 2021-10-20 NOTE — TELEPHONE ENCOUNTER
"PT WAS CALLED FOR HIS STAT CTA AND ECHO. PT CAN'T DO TODAY. GAVE HIM 10:15 TOMORROW FOR ECHO AND CTA \" I CAN'T DO THAT I WAS TOLD IT WOULD BE DONE BY TOMORROW\". I  ASK THE PT HOW I WAS TO GET THE TEST DONE BY TOMORROW IF HE CAN'T DO IT TODAY. I CAN DO IT IN THE AM. PT LET ME KNOW HE NEEDS TO WORK. I GAVE PT # TO SCHEDULING TO CALL AND RESCHEDULE APPT  TO WORK INTO HIS. SO, HE THEN LET ME KNOW HE HAS NOTHING TO WRITE ON AND HE NEEDS TO SCHEDULE HIS OWN APPTS. I ASSURED HIM THAT NOT KNOWING IT WOULD BE EASIER NOT KNOWING HIS SCHEDULE AND THE HOSPITAL AVAILABLY. PER PT \" JUST FOR GET IT I'LL DO IT MYSELF I DON'T KNOW WHY YOU CAN'T DO THIS I NEED FRIDAY AFTERNOON\" . AT THAT POINT I TOLD THE PT I WOULD [facility] APPT. I DON'T KNOW WHAT THEY'LL HAVE.   "

## 2021-10-20 NOTE — PROGRESS NOTES
Subjective   Arun Painting is a 46 y.o. male.     Chief Complaint   Patient presents with   • Chest Pain   • Neck Stiffness       History of Present Illness   Patient with a history of hypertension, hyperlipidemia, ulcerative colitis.  Complains of chest pain left side of the chest off-and-on with no radiation for several months.  He had similar pain last year did not get EKG or chest x-ray at the hospital.  He never came back to the office with similar complaints.  At the time reports the pain resolved.  Some of the times the pain comes with shortness of breath.  No diaphoresis.  Reports couple times blood pressure was high but when checked manually it was normal  He also ordered reports neck stiffness off-and-on.  No radiation of pain from the neck to to his arms, or numbness.  The following portions of the patient's history were reviewed and updated as appropriate: allergies, current medications, past family history, past medical history, past social history, past surgical history and problem list.    Review of Systems   Constitutional: Negative for activity change, appetite change, fatigue and fever.   Eyes: Negative for blurred vision and double vision.   Respiratory: Positive for shortness of breath. Negative for apnea, cough, choking, chest tightness, wheezing and stridor.    Cardiovascular: Positive for chest pain. Negative for palpitations and leg swelling.   Gastrointestinal: Negative.    Genitourinary: Negative for hematuria.   Musculoskeletal: Negative for arthralgias and back pain.   Neurological: Negative for dizziness, syncope, light-headedness and headache.       Allergies   Allergen Reactions   • Asacol [Mesalamine] Rash       Current Outpatient Medications on File Prior to Visit   Medication Sig Dispense Refill   • amLODIPine (NORVASC) 5 MG tablet Take 1 tablet by mouth Daily. 30 tablet 0   • colchicine 0.6 MG tablet TAKE ONE TABLET BY MOUTH TWICE A DAY 20 tablet 0   • meloxicam (MOBIC) 15 MG  tablet Take 1 tablet by mouth Daily As Needed for Moderate Pain . Take as directed with food. 30 tablet 2   • mesalamine (Apriso) 0.375 g 24 hr capsule Take 1 capsule by mouth 4 (Four) Times a Day. 360 capsule 3   • omeprazole (priLOSEC) 40 MG capsule TAKE ONE CAPSULE BY MOUTH DAILY 90 capsule 3     No current facility-administered medications on file prior to visit.       Family History   Problem Relation Age of Onset   • Colon polyps Mother    • Colon cancer Neg Hx        Past Medical History:   Diagnosis Date   • Arthritis     right shoulder   • Esophagitis    • Gastritis    • GERD (gastroesophageal reflux disease)    • Hiatal hernia    • Hypertension    • Ulcerative colitis (HCC)        Past Surgical History:   Procedure Laterality Date   • COLONOSCOPY  12/18/2013    stool, mild inflammation, mild-moderate chronic colitis w/crypt abscess    • COLONOSCOPY N/A 10/11/2017    Procedure: COLONOSCOPY  w/ biopsies; polypectomy;  Surgeon: Arlyn Cam MD;  Location: [facility];  Service:    • COLONOSCOPY N/A 10/30/2019    Procedure: COLONOSCOPY with biopsies;  Surgeon: Arlyn Cam MD;  Location: [facility] OR;  Service: Gastroenterology   • UPPER GASTROINTESTINAL ENDOSCOPY  08/07/2013    z line irregular, 38 cm from incisors, HH, gastritis, active esophagitis w/changes suggestive of an adjacent erosion, no h-pylorino Curran's       Social History     Socioeconomic History   • Marital status:    Tobacco Use   • Smoking status: Never Smoker   • Smokeless tobacco: Never Used   Substance and Sexual Activity   • Alcohol use: Yes     Alcohol/week: 6.0 standard drinks     Types: 6 Cans of beer per week     Comment: weekly   • Drug use: No   • Sexual activity: Defer       Patient Active Problem List   Diagnosis   • Ulcerative colitis without complications (HCC)   • Hypertension   • Atheroma, skin   • Mixed hyperlipidemia   • Personal history of colonic polyps   • Family history of polyps in the colon   • Idiopathic  "chronic gout of right foot       /80 (BP Location: Left arm, Patient Position: Sitting, Cuff Size: Large Adult)   Pulse 94   Temp 97.1 °F (36.2 °C) (Temporal)   Ht 177.8 cm (70\")   Wt 108 kg (238 lb)   SpO2 97%   BMI 34.15 kg/m²   Body mass index is 34.15 kg/m².    Objective   Physical Exam  Vitals and nursing note reviewed.   Constitutional:       Appearance: He is well-developed.   Eyes:      Pupils: Pupils are equal, round, and reactive to light.   Neck:      Thyroid: No thyromegaly.      Vascular: No JVD.      Trachea: No tracheal deviation.   Cardiovascular:      Rate and Rhythm: Normal rate and regular rhythm.      Heart sounds: Normal heart sounds. No murmur heard.  No friction rub. No gallop.    Pulmonary:      Effort: Pulmonary effort is normal. No respiratory distress.      Breath sounds: Normal breath sounds. No wheezing.   Abdominal:      General: Bowel sounds are normal. There is no distension.      Palpations: Abdomen is soft. There is no mass.      Tenderness: There is no abdominal tenderness. There is no guarding or rebound.      Hernia: No hernia is present.   Musculoskeletal:         General: Normal range of motion.      Cervical back: Normal range of motion and neck supple.   Lymphadenopathy:      Cervical: No cervical adenopathy.   Neurological:      General: No focal deficit present.      Mental Status: He is alert and oriented to person, place, and time. Mental status is at baseline.      Cranial Nerves: No cranial nerve deficit.      Sensory: No sensory deficit.      Motor: No weakness.      Coordination: Coordination normal.      Gait: Gait normal.      Deep Tendon Reflexes: Reflexes normal.   Psychiatric:         Mood and Affect: Mood normal.         Behavior: Behavior normal.           Assessment/Plan   Diagnoses and all orders for this visit:    1. Primary hypertension (Primary)  -     CBC & Differential; Future  -     Comprehensive Metabolic Panel; Future  -     Troponin T " ([facility]); Future    2. Mixed hyperlipidemia  -     CBC & Differential; Future  -     Comprehensive Metabolic Panel; Future  -     Troponin T ([facility]); Future    3. Atypical chest pain  -     CT Angiogram Chest; Future  -     CBC & Differential; Future  -     Comprehensive Metabolic Panel; Future  -     Troponin T ([facility]); Future  -     Adult Transthoracic Echo Complete W/ Cont if Necessary Per Protocol; Future  -     Ambulatory Referral to Cardiology    4. Dyspnea, unspecified type  -     CT Angiogram Chest; Future  -     CBC & Differential; Future  -     Comprehensive Metabolic Panel; Future  -     Troponin T ([facility]); Future  -     Adult Transthoracic Echo Complete W/ Cont if Necessary Per Protocol; Future  -     Ambulatory Referral to Cardiology    Continues a home medication of Norvasc and Prilosec.  Discussed with patient who is very important that he gets his CAT scan of the chest done, echo done.  I ordered labs for the hospital.  Patient does not want a CAT scan done today.  Hopefully will get it done by tomorrow.  At least he needs to go to the hospital get labs today.  He is aware that if he has excruciating pain he needs to go to the ER.  Currently has no chest pain or shortness of breath.  Regarding his neck stiffness have told him to put a heating pad, Tylenol.  Observe at this time.  I will see him back in 2 weeks time.  He is referred to cardiology.  Check his blood pressure at home.  Discussed with patient detail different causes for the chest pain.  I doubt is coming from his heart.  EKG showed normal sinus rhythm, has incomplete right bundle branch block.  No previous EKG to compare.  No ST elevation depression.  EHR dragon/transcription disclaimer:  Part of this note are created by electronic transcription/translation of spoken language to printed text and thus may lead to erroneous, or at times, nonsensical words or phrases inadvertently transcribed.  Although I have reviewed for such  errors, some may still exist.

## 2021-10-21 ENCOUNTER — APPOINTMENT (OUTPATIENT)
Dept: CT IMAGING | Facility: HOSPITAL | Age: 46
End: 2021-10-21

## 2021-10-21 ENCOUNTER — APPOINTMENT (OUTPATIENT)
Dept: CARDIOLOGY | Facility: HOSPITAL | Age: 46
End: 2021-10-21

## 2021-10-25 ENCOUNTER — HOSPITAL ENCOUNTER (OUTPATIENT)
Dept: CT IMAGING | Facility: HOSPITAL | Age: 46
Discharge: HOME | End: 2021-10-25

## 2021-10-25 ENCOUNTER — HOSPITAL ENCOUNTER (OUTPATIENT)
Dept: CARDIOLOGY | Facility: HOSPITAL | Age: 46
Setting detail: HOSPITAL OUTPATIENT SURGERY
Discharge: HOME | End: 2021-10-25

## 2021-10-25 VITALS
WEIGHT: 238 LBS | DIASTOLIC BLOOD PRESSURE: 82 MMHG | BODY MASS INDEX: 34.07 KG/M2 | HEIGHT: 70 IN | SYSTOLIC BLOOD PRESSURE: 124 MMHG

## 2021-10-25 DIAGNOSIS — R06.00 DYSPNEA, UNSPECIFIED TYPE: ICD-10-CM

## 2021-10-25 DIAGNOSIS — R07.89 ATYPICAL CHEST PAIN: ICD-10-CM

## 2021-10-25 LAB
AORTIC DIMENSIONLESS INDEX: 1 (DI)
BH CV ECHO MEAS - ACS: 2.3 CM
BH CV ECHO MEAS - AO MAX PG: 5 MMHG
BH CV ECHO MEAS - AO MEAN PG (FULL): 0 MMHG
BH CV ECHO MEAS - AO MEAN PG: 3 MMHG
BH CV ECHO MEAS - AO ROOT AREA (BSA CORRECTED): 1.3
BH CV ECHO MEAS - AO ROOT AREA: 7.1 CM^2
BH CV ECHO MEAS - AO ROOT DIAM: 3 CM
BH CV ECHO MEAS - AO V2 MAX: 121 CM/SEC
BH CV ECHO MEAS - AO V2 MEAN: 78.4 CM/SEC
BH CV ECHO MEAS - AO V2 VTI: 21.5 CM
BH CV ECHO MEAS - AVA(I,A): 3.7 CM^2
BH CV ECHO MEAS - AVA(I,D): 3.7 CM^2
BH CV ECHO MEAS - BSA(HAYCOCK): 2.3 M^2
BH CV ECHO MEAS - BSA: 2.2 M^2
BH CV ECHO MEAS - BZI_BMI: 34.1 KILOGRAMS/M^2
BH CV ECHO MEAS - BZI_METRIC_HEIGHT: 177.8 CM
BH CV ECHO MEAS - BZI_METRIC_WEIGHT: 108 KG
BH CV ECHO MEAS - EDV(CUBED): 81.2 ML
BH CV ECHO MEAS - EDV(MOD-SP2): 143 ML
BH CV ECHO MEAS - EDV(MOD-SP4): 187 ML
BH CV ECHO MEAS - EDV(TEICH): 84.4 ML
BH CV ECHO MEAS - EF(CUBED): 66.1 %
BH CV ECHO MEAS - EF(MOD-BP): 67 %
BH CV ECHO MEAS - EF(MOD-SP2): 66.4 %
BH CV ECHO MEAS - EF(MOD-SP4): 66.2 %
BH CV ECHO MEAS - EF(TEICH): 57.9 %
BH CV ECHO MEAS - ESV(CUBED): 27.5 ML
BH CV ECHO MEAS - ESV(MOD-SP2): 48.1 ML
BH CV ECHO MEAS - ESV(MOD-SP4): 63.2 ML
BH CV ECHO MEAS - ESV(TEICH): 35.6 ML
BH CV ECHO MEAS - FS: 30.3 %
BH CV ECHO MEAS - IVS/LVPW: 0.66
BH CV ECHO MEAS - IVSD: 1 CM
BH CV ECHO MEAS - LAT PEAK E' VEL: 10 CM/SEC
BH CV ECHO MEAS - LV DIASTOLIC VOL/BSA (35-75): 83.2 ML/M^2
BH CV ECHO MEAS - LV MASS(C)D: 212.4 GRAMS
BH CV ECHO MEAS - LV MASS(C)DI: 94.5 GRAMS/M^2
BH CV ECHO MEAS - LV MAX PG: 5.7 MMHG
BH CV ECHO MEAS - LV MEAN PG: 3 MMHG
BH CV ECHO MEAS - LV SYSTOLIC VOL/BSA (12-30): 28.1 ML/M^2
BH CV ECHO MEAS - LV V1 MAX: 119 CM/SEC
BH CV ECHO MEAS - LV V1 MEAN: 74.8 CM/SEC
BH CV ECHO MEAS - LV V1 VTI: 23 CM
BH CV ECHO MEAS - LVIDD: 4.3 CM
BH CV ECHO MEAS - LVIDS: 3 CM
BH CV ECHO MEAS - LVLD AP2: 9.3 CM
BH CV ECHO MEAS - LVLD AP4: 9.6 CM
BH CV ECHO MEAS - LVLS AP2: 7.7 CM
BH CV ECHO MEAS - LVLS AP4: 7.8 CM
BH CV ECHO MEAS - LVOT AREA (M): 3.5 CM^2
BH CV ECHO MEAS - LVOT AREA: 3.5 CM^2
BH CV ECHO MEAS - LVOT DIAM: 2.1 CM
BH CV ECHO MEAS - LVPWD: 1.6 CM
BH CV ECHO MEAS - MED PEAK E' VEL: 7.9 CM/SEC
BH CV ECHO MEAS - MV A MAX VEL: 63.8 CM/SEC
BH CV ECHO MEAS - MV DEC SLOPE: 299 CM/SEC^2
BH CV ECHO MEAS - MV DEC TIME: 306 SEC
BH CV ECHO MEAS - MV E MAX VEL: 70.3 CM/SEC
BH CV ECHO MEAS - MV E/A: 1.1
BH CV ECHO MEAS - MV MEAN PG: 2 MMHG
BH CV ECHO MEAS - MV P1/2T MAX VEL: 92.3 CM/SEC
BH CV ECHO MEAS - MV P1/2T: 90.4 MSEC
BH CV ECHO MEAS - MV V2 MEAN: 62.3 CM/SEC
BH CV ECHO MEAS - MV V2 VTI: 28.7 CM
BH CV ECHO MEAS - MVA P1/2T LCG: 2.4 CM^2
BH CV ECHO MEAS - MVA(P1/2T): 2.4 CM^2
BH CV ECHO MEAS - MVA(VTI): 2.8 CM^2
BH CV ECHO MEAS - RAP SYSTOLE: 3 MMHG
BH CV ECHO MEAS - SI(AO): 67.6 ML/M^2
BH CV ECHO MEAS - SI(CUBED): 23.9 ML/M^2
BH CV ECHO MEAS - SI(LVOT): 35.4 ML/M^2
BH CV ECHO MEAS - SI(MOD-SP2): 42.2 ML/M^2
BH CV ECHO MEAS - SI(MOD-SP4): 55.1 ML/M^2
BH CV ECHO MEAS - SI(TEICH): 21.7 ML/M^2
BH CV ECHO MEAS - SV(AO): 152 ML
BH CV ECHO MEAS - SV(CUBED): 53.6 ML
BH CV ECHO MEAS - SV(LVOT): 79.7 ML
BH CV ECHO MEAS - SV(MOD-SP2): 94.9 ML
BH CV ECHO MEAS - SV(MOD-SP4): 123.8 ML
BH CV ECHO MEAS - SV(TEICH): 48.9 ML
BH CV ECHO MEAS - TAPSE (>1.6): 2.6 CM
BH CV ECHO MEASUREMENTS AVERAGE E/E' RATIO: 7.85
BH CV XLRA - TDI S': 11.4 CM/SEC
LEFT ATRIUM VOLUME INDEX: 16 ML/M2
SINUS: 3 CM

## 2021-10-25 PROCEDURE — 93306 TTE W/DOPPLER COMPLETE: CPT | Performed by: INTERNAL MEDICINE

## 2021-10-25 PROCEDURE — 93306 TTE W/DOPPLER COMPLETE: CPT

## 2021-10-25 PROCEDURE — 0 IOPAMIDOL PER 1 ML: Performed by: INTERNAL MEDICINE

## 2021-10-25 PROCEDURE — 71275 CT ANGIOGRAPHY CHEST: CPT

## 2021-10-25 RX ADMIN — IOPAMIDOL 100 ML: 755 INJECTION, SOLUTION INTRAVENOUS at 17:25

## 2021-11-03 ENCOUNTER — OFFICE VISIT (OUTPATIENT)
Dept: FAMILY MEDICINE CLINIC | Facility: CLINIC | Age: 46
End: 2021-11-03

## 2021-11-03 VITALS
WEIGHT: 240 LBS | TEMPERATURE: 97.1 F | HEART RATE: 84 BPM | BODY MASS INDEX: 34.36 KG/M2 | HEIGHT: 70 IN | DIASTOLIC BLOOD PRESSURE: 74 MMHG | OXYGEN SATURATION: 97 % | SYSTOLIC BLOOD PRESSURE: 110 MMHG

## 2021-11-03 DIAGNOSIS — R07.89 ATYPICAL CHEST PAIN: ICD-10-CM

## 2021-11-03 DIAGNOSIS — R06.02 SHORTNESS OF BREATH: ICD-10-CM

## 2021-11-03 DIAGNOSIS — M54.2 NECK PAIN: Primary | ICD-10-CM

## 2021-11-03 PROCEDURE — 99213 OFFICE O/P EST LOW 20 MIN: CPT | Performed by: INTERNAL MEDICINE

## 2021-11-03 NOTE — PROGRESS NOTES
Subjective   Arun Painting is a 46 y.o. male.     Chief Complaint   Patient presents with   • 2 wk f/u   Follow-up for dyspnea, chest pain, neck pain.    History of Present Illness   Reports breathing and chest pain is lipid better.  He had a 2D echo that was normal, CT chest PE protocol was negative.  Troponin was negative.  He has appointment with Dr. Casper tomorrow.  He still concerned about his heart.  Regarding his family history his mother had angina at the age of 50  Continues to have neck soreness but mostly pain is bilateral trapezius.  This has been going on for few months.  No radiation to his arms, no numbness.  No injuries  The following portions of the patient's history were reviewed and updated as appropriate: allergies, current medications, past family history, past medical history, past social history, past surgical history and problem list.    Review of Systems   Constitutional: Negative for activity change, appetite change, fatigue and fever.   Eyes: Negative for blurred vision and double vision.   Respiratory: Negative.  Negative for shortness of breath.    Cardiovascular: Negative for chest pain, palpitations and leg swelling.   Gastrointestinal: Negative.    Neurological: Negative for dizziness, syncope, light-headedness and headache.       Allergies   Allergen Reactions   • Asacol [Mesalamine] Rash       Current Outpatient Medications on File Prior to Visit   Medication Sig Dispense Refill   • amLODIPine (NORVASC) 5 MG tablet Take 1 tablet by mouth Daily. 30 tablet 0   • colchicine 0.6 MG tablet TAKE ONE TABLET BY MOUTH TWICE A DAY 20 tablet 0   • mesalamine (Apriso) 0.375 g 24 hr capsule Take 1 capsule by mouth 4 (Four) Times a Day. 360 capsule 3   • omeprazole (priLOSEC) 40 MG capsule TAKE ONE CAPSULE BY MOUTH DAILY 90 capsule 3   • meloxicam (MOBIC) 15 MG tablet Take 1 tablet by mouth Daily As Needed for Moderate Pain . Take as directed with food. 30 tablet 2     No current  "facility-administered medications on file prior to visit.       Family History   Problem Relation Age of Onset   • Colon polyps Mother    • Colon cancer Neg Hx        Past Medical History:   Diagnosis Date   • Arthritis     right shoulder   • Esophagitis    • Gastritis    • GERD (gastroesophageal reflux disease)    • Hiatal hernia    • Hypertension    • Ulcerative colitis (HCC)        Past Surgical History:   Procedure Laterality Date   • COLONOSCOPY  12/18/2013    stool, mild inflammation, mild-moderate chronic colitis w/crypt abscess    • COLONOSCOPY N/A 10/11/2017    Procedure: COLONOSCOPY  w/ biopsies; polypectomy;  Surgeon: Arlyn Cam MD;  Location: [facility] OR;  Service:    • COLONOSCOPY N/A 10/30/2019    Procedure: COLONOSCOPY with biopsies;  Surgeon: Arlyn Cam MD;  Location: [facility] OR;  Service: Gastroenterology   • UPPER GASTROINTESTINAL ENDOSCOPY  08/07/2013    z line irregular, 38 cm from incisors, HH, gastritis, active esophagitis w/changes suggestive of an adjacent erosion, no h-pylorino Curran's       Social History     Socioeconomic History   • Marital status:    Tobacco Use   • Smoking status: Never Smoker   • Smokeless tobacco: Never Used   Substance and Sexual Activity   • Alcohol use: Yes     Alcohol/week: 6.0 standard drinks     Types: 6 Cans of beer per week     Comment: weekly   • Drug use: No   • Sexual activity: Defer       Patient Active Problem List   Diagnosis   • Ulcerative colitis without complications (HCC)   • Hypertension   • Atheroma, skin   • Mixed hyperlipidemia   • Personal history of colonic polyps   • Family history of polyps in the colon   • Idiopathic chronic gout of right foot       /74 (BP Location: Left arm, Patient Position: Sitting, Cuff Size: Large Adult)   Pulse 84   Temp 97.1 °F (36.2 °C) (Temporal)   Ht 177.8 cm (70\")   Wt 109 kg (240 lb)   SpO2 97%   BMI 34.44 kg/m²   Body mass index is 34.44 kg/m².    Objective   Physical Exam  Vitals and " nursing note reviewed.   Constitutional:       Appearance: He is well-developed.   Eyes:      Pupils: Pupils are equal, round, and reactive to light.   Neck:      Thyroid: No thyromegaly.      Vascular: No JVD.      Trachea: No tracheal deviation.   Cardiovascular:      Rate and Rhythm: Normal rate and regular rhythm.      Heart sounds: Normal heart sounds. No murmur heard.  No friction rub. No gallop.    Pulmonary:      Effort: Pulmonary effort is normal. No respiratory distress.      Breath sounds: Normal breath sounds. No wheezing.   Abdominal:      General: Bowel sounds are normal. There is no distension.      Palpations: Abdomen is soft. There is no mass.      Tenderness: There is no abdominal tenderness. There is no guarding or rebound.      Hernia: No hernia is present.   Musculoskeletal:         General: Normal range of motion.      Cervical back: Normal range of motion and neck supple.   Lymphadenopathy:      Cervical: No cervical adenopathy.   Neurological:      Mental Status: He is alert and oriented to person, place, and time.   Psychiatric:         Mood and Affect: Mood normal.         Behavior: Behavior normal.           Assessment/Plan   Diagnoses and all orders for this visit:    1. Neck pain (Primary)  -     Ambulatory Referral to Physical Therapy Evaluate and treat    2. Shortness of breath    3. Atypical chest pain    Keep appointment with Dr. Casper tomorrow.  Continue taking current medications.  Discussed with patient the different causes for chest pains.  Referred him for physical therapy.  I will see him back in 4 weeks time for his neck.  If pain gets worse earlier.  Otherwise 3 months fasting.  EHR dragon/transcription disclaimer:  Part of this note are created by electronic transcription/translation of spoken language to printed text and thus may lead to erroneous, or at times, nonsensical words or phrases inadvertently transcribed.  Although I have reviewed for such errors, some may still  exist.

## 2021-11-04 ENCOUNTER — OFFICE VISIT (OUTPATIENT)
Dept: CARDIOLOGY | Facility: CLINIC | Age: 46
End: 2021-11-04

## 2021-11-04 VITALS
BODY MASS INDEX: 34.36 KG/M2 | HEART RATE: 72 BPM | HEIGHT: 70 IN | DIASTOLIC BLOOD PRESSURE: 86 MMHG | WEIGHT: 240 LBS | SYSTOLIC BLOOD PRESSURE: 146 MMHG

## 2021-11-04 DIAGNOSIS — K51.90 ULCERATIVE COLITIS WITHOUT COMPLICATIONS, UNSPECIFIED LOCATION (HCC): ICD-10-CM

## 2021-11-04 DIAGNOSIS — R07.89 OTHER CHEST PAIN: Primary | ICD-10-CM

## 2021-11-04 DIAGNOSIS — K21.9 GASTROESOPHAGEAL REFLUX DISEASE, UNSPECIFIED WHETHER ESOPHAGITIS PRESENT: ICD-10-CM

## 2021-11-04 DIAGNOSIS — I10 PRIMARY HYPERTENSION: ICD-10-CM

## 2021-11-04 DIAGNOSIS — K44.9 HIATAL HERNIA: ICD-10-CM

## 2021-11-04 PROCEDURE — 93000 ELECTROCARDIOGRAM COMPLETE: CPT | Performed by: INTERNAL MEDICINE

## 2021-11-04 PROCEDURE — 99204 OFFICE O/P NEW MOD 45 MIN: CPT | Performed by: INTERNAL MEDICINE

## 2021-11-04 NOTE — PROGRESS NOTES
Subjective:     Encounter Date:11/04/21      Patient ID: Arun Painting is a 46 y.o. male.    Chief Complaint:  History of Present Illness    Dear Dr. Ascencio,    I had the pleasure of seeing this patient in the office today for initial evaluation and consultation.  I appreciate that you sent him in to see us.  They come in today to be seen for epigastric and chest discomfort.    This patient has no known cardiac history.  This patient has no history of coronary artery disease, congestive heart failure, rheumatic fever, rheumatic heart disease, congenital heart disease or heart murmur.  This patient has never required invasive cardiovascular evaluation.    He has a history of hiatal hernia, GERD, ulcerative colitis.  He has been having more frequent reflux symptoms when he is laying down recently.  He is noted that he has been having some discomfort from his epigastrium into his lower precordium.  He will notice this when he is laying down, he also will tend to notice it sometimes in the morning right after he gets out of bed.  No associated nausea, vomiting, diaphoresis, shortness of breath.  No radiation.    He is active throughout the day and he does not have any symptoms at all when he is active.    He recently had an echocardiogram that was unremarkable.    The following portions of the patient's history were reviewed and updated as appropriate: allergies, current medications, past family history, past medical history, past social history, past surgical history and problem list.      ECG 12 Lead    Date/Time: 11/4/2021 2:04 PM  Performed by: Bhavik Casper III, MD  Authorized by: Bhavik Casper III, MD   Comparison: compared with previous ECG   Similar to previous ECG  Rhythm: sinus rhythm  Rate: normal  Conduction: conduction normal  ST Segments: ST segments normal  T Waves: T waves normal  QRS axis: normal  Other: no other findings    Clinical impression: normal ECG               Objective:  "    Vitals:    11/04/21 1350   BP: 146/86   Pulse: 72   Weight: 109 kg (240 lb)   Height: 177.8 cm (70\")     Body mass index is 34.44 kg/m².      Vitals reviewed.   Constitutional:       General: Not in acute distress.     Appearance: Well-developed. Not diaphoretic.   Eyes:      General:         Right eye: No discharge.         Left eye: No discharge.      Conjunctiva/sclera: Conjunctivae normal.      Pupils: Pupils are equal, round, and reactive to light.   HENT:      Head: Normocephalic and atraumatic.      Nose: Nose normal.   Neck:      Thyroid: No thyromegaly.      Trachea: No tracheal deviation.      Lymphadenopathy: No cervical adenopathy.   Pulmonary:      Effort: Pulmonary effort is normal. No respiratory distress.      Breath sounds: Normal breath sounds. No stridor.   Chest:      Chest wall: Not tender to palpatation.   Cardiovascular:      Normal rate. Regular rhythm.      Murmurs: There is no murmur.      . No S3 gallop. No click. No rub.   Pulses:     Intact distal pulses.   Edema:     Peripheral edema absent.   Abdominal:      General: Bowel sounds are normal. There is no distension.      Palpations: Abdomen is soft. There is no abdominal mass.      Tenderness: There is no abdominal tenderness. There is no guarding or rebound.   Musculoskeletal: Normal range of motion.         General: No tenderness or deformity.      Cervical back: Normal range of motion and neck supple. Skin:     General: Skin is warm and dry.      Findings: No erythema or rash.   Neurological:      Mental Status: Alert and oriented to person, place, and time.      Deep Tendon Reflexes: Reflexes are normal and symmetric.   Psychiatric:         Thought Content: Thought content normal.         Data and records reviewed:     Lab Results   Component Value Date    GLUCOSE 98 10/20/2021    BUN 11 10/20/2021    CREATININE 0.75 (L) 10/20/2021    EGFRIFNONA 112 10/20/2021    EGFRIFAFRI 123 06/30/2021    BCR 14.7 10/20/2021    K 3.8 " 10/20/2021    CO2 25.3 10/20/2021    CALCIUM 9.3 10/20/2021    PROTENTOTREF 7.0 06/30/2021    ALBUMIN 4.10 10/20/2021    LABIL2 1.6 06/30/2021    AST 19 10/20/2021    ALT 23 10/20/2021     No results found for: CHOL  Lab Results   Component Value Date    TRIG 164 (H) 06/30/2021    TRIG 109 12/16/2020    TRIG 271 (H) 12/04/2019     Lab Results   Component Value Date    HDL 35 (L) 06/30/2021    HDL 41 12/16/2020    HDL 45 12/04/2019     Lab Results   Component Value Date     (H) 06/30/2021     (H) 12/16/2020     (H) 12/04/2019     Lab Results   Component Value Date    VLDL 29 06/30/2021    VLDL 20 12/16/2020    VLDL 54.2 12/04/2019     No results found for: LDLHDL  CBC    CBC 12/16/20 6/30/21 10/20/21   WBC 6.28 8.46 8.54   RBC 4.85 4.44 4.63   Hemoglobin 14.4 13.1 13.7   Hematocrit 41.7 38.6 41.5   MCV 86.0 86.9 89.6   MCH 29.7 29.5 29.6   MCHC 34.5 33.9 33.0   RDW 12.1 (A) 12.0 (A) 12.2 (A)   Platelets 218 228 203   (A) Abnormal value            CT Angiogram Chest    Result Date: 10/25/2021  1. Negative for aortic dissection and pulmonary embolism. There is incidental mild aneurysmal dilation of the proximal descending aorta. 2. No acute findings in the chest. Signer Name: Ronit Claudio MD  Signed: 10/25/2021 5:40 PM  Workstation Name: [id number]  [facility]    Results for orders placed during the hospital encounter of 10/25/21    Adult Transthoracic Echo Complete W/ Cont if Necessary Per Protocol    Interpretation Summary  · Left ventricular ejection fraction appears to be 61 - 65%. Left ventricular systolic function is normal.  · There is mild asymmetric septal hypertrophy  · Left ventricular diastolic function is consistent with (grade I) impaired relaxation.  · Normal right ventricular cavity size and systolic function noted.  · There is no evidence of pericardial effusion          Assessment:          Diagnosis Plan   1. Other chest pain     2. Hiatal hernia     3.  Gastroesophageal reflux disease, unspecified whether esophagitis present     4. Ulcerative colitis without complications, unspecified location (HCC)            Plan:       1.  Epigastric and chest discomfort, does not sound consistent with an anginal etiology, EKG is unremarkable, echocardiogram is unremarkable.  He does not meet guideline recommendations for any additional cardiac testing at this time  2.  Patient is having more frequent reflux symptoms at night when he is lying down, he has a history of hiatal hernia, GERD, esophagitis, and ulcerative colitis, I suggested that he reach out to Dr. Sinclair for any suggestions or input  3.  Patient has not had Covid but has not had his Covid vaccination.  He is reluctant to do so until he gets clearance from Dr. Sinclair that is okay to get a vaccination given his ulcerative colitis.  He is planning on waiting till January to discuss with  Dr. Sinclair, I suggest that he go ahead and call and ask if he can get the vaccination.    Thank you very much for allowing us to participate in the care of this pleasant patient.  Please don't hesitate to call if I can be of assistance in any way.      Current Outpatient Medications:   •  amLODIPine (NORVASC) 5 MG tablet, Take 1 tablet by mouth Daily., Disp: 30 tablet, Rfl: 0  •  colchicine 0.6 MG tablet, TAKE ONE TABLET BY MOUTH TWICE A DAY (Patient taking differently: Take 0.6 mg by mouth As Needed.), Disp: 20 tablet, Rfl: 0  •  mesalamine (Apriso) 0.375 g 24 hr capsule, Take 1 capsule by mouth 4 (Four) Times a Day., Disp: 360 capsule, Rfl: 3  •  omeprazole (priLOSEC) 40 MG capsule, TAKE ONE CAPSULE BY MOUTH DAILY, Disp: 90 capsule, Rfl: 3         No follow-ups on file.

## 2021-11-17 ENCOUNTER — HOSPITAL ENCOUNTER (OUTPATIENT)
Dept: PHYSICAL THERAPY | Facility: HOSPITAL | Age: 46
Setting detail: THERAPIES SERIES
Discharge: HOME | End: 2021-11-17

## 2021-11-17 DIAGNOSIS — M54.2 NECK PAIN: Primary | ICD-10-CM

## 2021-11-17 PROCEDURE — 97161 PT EVAL LOW COMPLEX 20 MIN: CPT | Performed by: PHYSICAL THERAPIST

## 2021-11-17 NOTE — THERAPY EVALUATION
Outpatient Physical Therapy Ortho Initial Evaluation   Aaliyah Oh     Patient Name: Arun Painting  : 1975  MRN: [id number]  Today's Date: 2021      Visit Date: 2021    Patient Active Problem List   Diagnosis   • Ulcerative colitis without complications (HCC)   • Hypertension   • Atheroma, skin   • Mixed hyperlipidemia   • Personal history of colonic polyps   • Family history of polyps in the colon   • Idiopathic chronic gout of right foot   • GERD (gastroesophageal reflux disease)   • Hiatal hernia        Past Medical History:   Diagnosis Date   • Arthritis     right shoulder   • Esophagitis    • Gastritis    • GERD (gastroesophageal reflux disease)    • Hiatal hernia    • Hypertension    • Ulcerative colitis (HCC)         Past Surgical History:   Procedure Laterality Date   • COLONOSCOPY  2013    stool, mild inflammation, mild-moderate chronic colitis w/crypt abscess    • COLONOSCOPY N/A 10/11/2017    Procedure: COLONOSCOPY  w/ biopsies; polypectomy;  Surgeon: Arlyn Cam MD;  Location: [facility] OR;  Service:    • COLONOSCOPY N/A 10/30/2019    Procedure: COLONOSCOPY with biopsies;  Surgeon: Arlyn Cam MD;  Location: [facility] OR;  Service: Gastroenterology   • UPPER GASTROINTESTINAL ENDOSCOPY  2013    z line irregular, 38 cm from incisors, HH, gastritis, active esophagitis w/changes suggestive of an adjacent erosion, no h-pylorino Curran's       Visit Dx:     ICD-10-CM ICD-9-CM   1. Neck pain  M54.2 723.1          Patient History     Row Name 21 0600             History    Chief Complaint Pain  -GC      Type of Pain Neck pain  -GC      Brief Description of Current Complaint Pt states that he has had some neck stiffness for the past 6 months that he notices when he truns his head. He mentioned it to Dr. Mckeon when he was being seen for an unrelated issue. Dr. Jaffe recommended therapy. The patient states that he does not thnk therapy is necessary.  -GC       Patient/Caregiver Goals Relieve pain  -GC      Patient's Rating of General Health Good  -GC      Hand Dominance right-handed  -GC      Occupation/sports/leisure activities   -GC              Pain     Pain Location Neck  -GC      Pain at Present 0  -GC      Pain at Best 0  -GC      Pain at Worst 1  -GC      Pain Frequency Intermittent  -GC      Pain Description Tightness  -GC      Difficulties at work? Pt has some stiffness when he turns his head while driving  -GC              Daily Activities    Primary Language English  -GC      How does patient learn best? Listening  -GC      Teaching needs identified Home Exercise Program; Management of Condition  -GC      Patient is concerned about/has problems with Flexibility  -GC      Does patient have problems with the following? None  -GC      Barriers to learning None  -GC      Pt Participated in POC and Goals Yes  -GC              Safety    Are you being hurt, hit, or frightened by anyone at home or in your life? No  -GC      Are you being neglected by a caregiver No  -GC            User Key  (r) = Recorded By, (t) = Taken By, (c) = Cosigned By    Initials Name Provider Type    GC Jackson Orta, PT Physical Therapist                 PT Ortho     Row Name 11/17/21 0600       Posture/Observations    Forward Head Mild  -GC    Rounded Shoulders Bilateral:; Mild  -GC    Scapular Elevation Bilateral:; Normal  -GC    Scapular winging Bilateral:; Normal  -GC    Posture/Observations Comments --  -GC       Cervical Palpation    Levator Scapula Bilateral:; Tender; Guarded/taut  -GC    Upper Traps Bilateral:; Tender; Guarded/taut  -GC       Cervical Accessory Motions    Sideglide- C3 WNL  -GC    Sideglide- C4 WNL  -GC    Sideglide- C5 WNL  -GC    Sideglide- C6 WNL  -GC    Sideglide- C7 WNL  -GC       Cervical/Thoracic Special Tests    Cervical Compression (Forarminal Compression vs. Facet Pain) Negative  -GC    Cervical Distraction (Foraminal Compression vs. Facet Pain)  Negative  -GC    Sharp-Joel (AA instability) Negative  -GC    Vertebral Artery Test (VBI Sign) Bilateral:; Negative  -GC       Head/Neck/Trunk    Neck Extension AROM WFL  -GC    Neck Flexion AROM 75% range  -GC    Neck Lt Lateral Flexion AROM 75% range  -GC    Neck Rt Lateral Flexion AROM 75% range  -GC    Neck Lt Rotation AROM WFL with c/o stiffness  -GC    Neck Rt Rotation AROM WFL with c/o stiffness  -GC       MMT (Manual Muscle Testing)    General MMT Comments bilateral UE strength is 5/5  -GC       Sensation    Light Touch No apparent deficits  -GC       Upper Extremity Flexibility    Scalenes Bilateral:; WNL  -GC    SCM Bilateral:; WNL  -GC    Upper Trapezius Bilateral:; Mildly limited  -GC    Levator Scapula Bilateral:; Mildly limited  -GC          User Key  (r) = Recorded By, (t) = Taken By, (c) = Cosigned By    Initials Name Provider Type    GC Jackson Orta, PT Physical Therapist                                   PT OP Goals     Row Name 11/17/21 0600          PT Short Term Goals    STG Date to Achieve 11/17/21  -     STG 1 Pt will be independent with his HEP issued by this therapist.  -GC     STG 1 Progress Met  -            Long Term Goals    LTG Date to Achieve --  -GC            Time Calculation    PT Goal Re-Cert Due Date --  -GC           User Key  (r) = Recorded By, (t) = Taken By, (c) = Cosigned By    Initials Name Provider Type     Jackson Orta, PT Physical Therapist                 PT Assessment/Plan     Row Name 11/17/21 0600          PT Assessment    Functional Limitations --  -GC     Impairments Impaired flexibility; Range of motion  -     Assessment Comments Pt presents with an approximate 6 month history of neck stiffness that he rates only as a 1/10 at its worse. He says the stiffness does not interfere with any daily activity, but he does notice it when he turns his head. He has mild upper trap and levator tightness with mild limitations in cervical flexion and rotation. Feel  he would benefit from an HEP of stretches, but do not feel he requires skilled therapy services.  -GC     Rehab Potential Good  -GC     Patient/caregiver participated in establishment of treatment plan and goals Yes  -GC     Patient would benefit from skilled therapy intervention No  -GC            PT Plan    PT Frequency One time visit  -GC     Predicted Duration of Therapy Intervention (PT) --  -GC     Planned CPT's? PT EVAL LOW COMPLEXITY: 96114  -     PT Plan Comments Pt is to continue his HEP 2x daily  -           User Key  (r) = Recorded By, (t) = Taken By, (c) = Cosigned By    Initials Name Provider Type    Jackson Rocha, PT Physical Therapist                   OP Exercises     Row Name 11/17/21 0600             Exercise 1    Exercise Name 1 Upper trap stretch-bilateral  -GC      Cueing 1 Verbal; Demo  -GC      Reps 1 10  -GC      Time 1 10 secs  -GC              Exercise 2    Exercise Name 2 Levator stretch  -GC      Cueing 2 Verbal; Demo  -GC      Reps 2 10  -GC      Time 2 10 secs  -GC            User Key  (r) = Recorded By, (t) = Taken By, (c) = Cosigned By    Initials Name Provider Type     Jackson Orta, PT Physical Therapist                              Outcome Measure Options: Neck Disability Index (NDI)  Neck Disability Index  Section 1 - Pain Intensity: I have no pain at the moment.  Section 2 - Personal Care: I can look after myself normally without causing extra pain.  Section 3 - Lifting: I can lift heavy weights without causing extra pain  Section 4 - Work: I can do as much work as I want.  Section 5 - Headaches: I have moderate headaches that come infrequently.  Section 6 - Concentration: I can concentrate fully without difficulty.  Section 7 - Sleeping: My sleep is mildly disturbed for up to 1-2 hours.  Section 8 - Driving: I can drive my car without neck pain  Section 9 - Reading: I can read as much as I want with slight neck pain.  Section 10 - Recreation: I have some neck pain  with all recreational activities.  Neck Disability Index Score: 6      Time Calculation:     Start Time: 0600  Stop Time: 0630  Time Calculation (min): 30 min     Therapy Charges for Today     Code Description Service Date Service Provider Modifiers Qty    31235087101 HC PT EVAL LOW COMPLEXITY 2 11/17/2021 Jackson Orta, PT GP 1          PT G-Codes  Outcome Measure Options: Neck Disability Index (NDI)  Neck Disability Index Score: 6         Jackson Orta, PT  11/17/2021

## 2021-12-08 RX ORDER — AMLODIPINE BESYLATE 5 MG/1
TABLET ORAL
Qty: 30 TABLET | Refills: 5 | Status: SHIPPED | OUTPATIENT
Start: 2021-12-08 | End: 2022-08-01 | Stop reason: SDUPTHER

## 2022-01-05 ENCOUNTER — OFFICE VISIT (OUTPATIENT)
Dept: GASTROENTEROLOGY | Facility: CLINIC | Age: 47
End: 2022-01-05

## 2022-01-05 VITALS
SYSTOLIC BLOOD PRESSURE: 138 MMHG | DIASTOLIC BLOOD PRESSURE: 90 MMHG | WEIGHT: 246.2 LBS | BODY MASS INDEX: 35.24 KG/M2 | HEIGHT: 70 IN

## 2022-01-05 DIAGNOSIS — K21.9 GASTROESOPHAGEAL REFLUX DISEASE, UNSPECIFIED WHETHER ESOPHAGITIS PRESENT: ICD-10-CM

## 2022-01-05 DIAGNOSIS — K51.90 ULCERATIVE COLITIS WITHOUT COMPLICATIONS, UNSPECIFIED LOCATION: ICD-10-CM

## 2022-01-05 DIAGNOSIS — M1A.9XX0 CHRONIC GOUT WITHOUT TOPHUS, UNSPECIFIED CAUSE, UNSPECIFIED SITE: ICD-10-CM

## 2022-01-05 DIAGNOSIS — Z86.010 PERSONAL HISTORY OF COLONIC POLYPS: ICD-10-CM

## 2022-01-05 DIAGNOSIS — Z83.71 FAMILY HISTORY OF POLYPS IN THE COLON: Primary | ICD-10-CM

## 2022-01-05 PROCEDURE — 99214 OFFICE O/P EST MOD 30 MIN: CPT | Performed by: INTERNAL MEDICINE

## 2022-01-05 NOTE — PROGRESS NOTES
PATIENT INFORMATION  Arun SINGH Painting       - 1975    CHIEF COMPLAINT  Chief Complaint   Patient presents with   • Heartburn   • Ulcerative Colitis       HISTORY OF PRESENT ILLNESS  UC left sided from  and is now well maintained on 2 apriso a day and his pattern is 2-3 in the AM and rarely another during the Day and no blood / Mucous nor urgency. Has used enemas in the past and has had Prednisone.    Reviewed fiber and Probiotic as well a vitamins      REVIEWED PERTINENT RESULTS/ LABS  Lab Results   Component Value Date    CASEREPORT  10/30/2019     Surgical Pathology Report                         Case: [id number]                                  Authorizing Provider:  Arlyn Cam MD         Collected:           10/30/2019 09:39 AM          Ordering Location:     [facility]   Received:            10/30/2019 10:12 AM                                 OR                                                                           Pathologist:           Lillian Patel MD                                                          Specimens:   1) - Large Intestine, Right / Ascending Colon, right colon  biopsy                                  2) - Large Intestine, Transverse Colon, transverse colon  biopsy                                    3) - Large Intestine, Left / Descending Colon, left colon biopsies                                  4) - Large Intestine, Rectum, rectal biopsies                                              FINALDX  10/30/2019     1. Right Colon, Biopsy:   A. Chronic colitis without activity.   B. Negative for granulomas, viral cytopathic effect and dysplasia.    2. Transverse Colon, Biopsy:   A. Chronic colitis without activity.   B. Negative for granulomas, viral cytopathic effect and dysplasia.    3. Left Colon, Biopsy:   A. Chronic colitis with mild activity (cryptitis).   B. Negative for granulomas, viral cytopathic effect and dysplasia.    4. Rectum, Biopsy:    A.  Chronic proctitis with mild activity (cryptitis).   B. Negative for granulomas, viral cytopathic effect and dysplasia.    jab/jse        Lab Results   Component Value Date    HGB 13.7 10/20/2021    MCV 89.6 10/20/2021     10/20/2021    ALT 23 10/20/2021    AST 19 10/20/2021    TRIG 164 (H) 06/30/2021      No results found.    REVIEW OF SYSTEMS  Review of Systems   Constitutional: Negative for activity change, chills, fever and unexpected weight change.   HENT: Negative for congestion.    Eyes: Negative for visual disturbance.   Respiratory: Negative for shortness of breath.    Cardiovascular: Negative for chest pain and palpitations.   Gastrointestinal: Positive for constipation and diarrhea. Negative for abdominal pain and blood in stool.   Endocrine: Negative for cold intolerance and heat intolerance.   Genitourinary: Negative for hematuria.   Musculoskeletal: Negative for gait problem.   Skin: Negative for color change.   Allergic/Immunologic: Negative for immunocompromised state.   Neurological: Negative for weakness and light-headedness.   Hematological: Negative for adenopathy.   Psychiatric/Behavioral: Negative for sleep disturbance. The patient is not nervous/anxious.          ACTIVE PROBLEMS  Patient Active Problem List    Diagnosis    • GERD (gastroesophageal reflux disease) [K21.9]    • Hiatal hernia [K44.9]    • Idiopathic chronic gout of right foot [M1A.0710]    • Personal history of colonic polyps [Z86.010]    • Family history of polyps in the colon [Z83.71]    • Hypertension [I10]    • Atheroma, skin [L72.0]    • Mixed hyperlipidemia [E78.2]    • Ulcerative colitis without complications (HCC) [K51.90]          PAST MEDICAL HISTORY  Past Medical History:   Diagnosis Date   • Arthritis     right shoulder   • Esophagitis    • Gastritis    • GERD (gastroesophageal reflux disease)    • Hiatal hernia    • Hypertension    • Ulcerative colitis (HCC)          SURGICAL HISTORY  Past Surgical History:  "  Procedure Laterality Date   • COLONOSCOPY  12/18/2013    stool, mild inflammation, mild-moderate chronic colitis w/crypt abscess    • COLONOSCOPY N/A 10/11/2017    Procedure: COLONOSCOPY  w/ biopsies; polypectomy;  Surgeon: Arlyn Cam MD;  Location: [facility] OR;  Service:    • COLONOSCOPY N/A 10/30/2019    Procedure: COLONOSCOPY with biopsies;  Surgeon: Arlyn Cam MD;  Location: [facility] OR;  Service: Gastroenterology   • UPPER GASTROINTESTINAL ENDOSCOPY  08/07/2013    z line irregular, 38 cm from incisors, HH, gastritis, active esophagitis w/changes suggestive of an adjacent erosion, no h-pylorino Curran's         FAMILY HISTORY  Family History   Problem Relation Age of Onset   • Colon polyps Mother    • Diabetes Father    • Colon cancer Neg Hx          SOCIAL HISTORY  Social History     Occupational History   • Not on file   Tobacco Use   • Smoking status: Never Smoker   • Smokeless tobacco: Never Used   Vaping Use   • Vaping Use: Never used   Substance and Sexual Activity   • Alcohol use: Yes     Alcohol/week: 6.0 standard drinks     Types: 6 Cans of beer per week     Comment: weekly   • Drug use: No   • Sexual activity: Defer         CURRENT MEDICATIONS    Current Outpatient Medications:   •  amLODIPine (NORVASC) 5 MG tablet, TAKE ONE TABLET BY MOUTH DAILY, Disp: 30 tablet, Rfl: 5  •  colchicine 0.6 MG tablet, TAKE ONE TABLET BY MOUTH TWICE A DAY (Patient taking differently: Take 0.6 mg by mouth As Needed.), Disp: 20 tablet, Rfl: 0  •  mesalamine (Apriso) 0.375 g 24 hr capsule, Take 1 capsule by mouth 4 (Four) Times a Day., Disp: 360 capsule, Rfl: 3  •  omeprazole (priLOSEC) 40 MG capsule, TAKE ONE CAPSULE BY MOUTH DAILY, Disp: 90 capsule, Rfl: 3    ALLERGIES  Asacol [mesalamine]    VITALS  Vitals:    01/05/22 0859   BP: 138/90   BP Location: Left arm   Patient Position: Sitting   Cuff Size: Large Adult   Weight: 112 kg (246 lb 3.2 oz)   Height: 177.8 cm (70\")       PHYSICAL EXAM  Debilities/Disabilities " "Identified: None  Emotional Behavior: Appropriate  Wt Readings from Last 3 Encounters:   01/05/22 112 kg (246 lb 3.2 oz)   11/04/21 109 kg (240 lb)   11/03/21 109 kg (240 lb)     Ht Readings from Last 1 Encounters:   01/05/22 177.8 cm (70\")     Body mass index is 35.33 kg/m².  Physical Exam  Constitutional:       Appearance: He is well-developed.   HENT:      Head: Normocephalic and atraumatic.   Eyes:      General: No scleral icterus.     Pupils: Pupils are equal, round, and reactive to light.   Neck:      Thyroid: No thyromegaly.   Cardiovascular:      Rate and Rhythm: Normal rate and regular rhythm.      Heart sounds: Normal heart sounds. No murmur heard.  No gallop.    Pulmonary:      Effort: Pulmonary effort is normal.      Breath sounds: Normal breath sounds. No wheezing or rales.   Abdominal:      General: Bowel sounds are normal. There is no distension or abdominal bruit.      Palpations: Abdomen is soft. Abdomen is not rigid. There is no shifting dullness, fluid wave, hepatomegaly, splenomegaly, mass or pulsatile mass.      Tenderness: There is no abdominal tenderness. There is no guarding or rebound. Negative signs include Shaikh's sign.      Hernia: No hernia is present. There is no hernia in the ventral area.   Musculoskeletal:         General: Normal range of motion.      Cervical back: Normal range of motion and neck supple.   Lymphadenopathy:      Cervical: No cervical adenopathy.   Skin:     General: Skin is warm and dry.      Findings: No erythema or rash.   Neurological:      Mental Status: He is alert and oriented to person, place, and time.   Psychiatric:         Mood and Affect: Mood normal.         Behavior: Behavior normal.         CLINICAL DATA REVIEWED   reviewed previous lab results and integrated with today's visit, reviewed notes from other physicians and/or last GI encounter, reviewed previous endoscopy results and available photos, reviewed surgical pathology results from previous " biopsies    ASSESSMENT  Diagnoses and all orders for this visit:    Family history of polyps in the colon    Ulcerative colitis without complications, unspecified location (HCC)    Personal history of colonic polyps    Gastroesophageal reflux disease, unspecified whether esophagitis present    Chronic gout without tophus, unspecified cause, unspecified site          PLAN  Recall in 10/ 2022 for surveillance - will see as needed or catch up at the end of the year    No follow-ups on file.    I have discussed the above plan with the patient.  They verbalize understanding and are in agreement with the plan.  They have been advised to contact the office for any questions, concerns, or changes related to their health.

## 2022-01-12 ENCOUNTER — OFFICE VISIT (OUTPATIENT)
Dept: FAMILY MEDICINE CLINIC | Facility: CLINIC | Age: 47
End: 2022-01-12

## 2022-01-12 VITALS
WEIGHT: 244 LBS | BODY MASS INDEX: 34.93 KG/M2 | SYSTOLIC BLOOD PRESSURE: 142 MMHG | HEART RATE: 96 BPM | OXYGEN SATURATION: 95 % | TEMPERATURE: 96.9 F | RESPIRATION RATE: 16 BRPM | DIASTOLIC BLOOD PRESSURE: 92 MMHG | HEIGHT: 70 IN

## 2022-01-12 DIAGNOSIS — E78.2 MIXED HYPERLIPIDEMIA: ICD-10-CM

## 2022-01-12 DIAGNOSIS — I10 PRIMARY HYPERTENSION: Primary | ICD-10-CM

## 2022-01-12 DIAGNOSIS — J06.9 VIRAL UPPER RESPIRATORY TRACT INFECTION: ICD-10-CM

## 2022-01-12 DIAGNOSIS — K21.9 GASTROESOPHAGEAL REFLUX DISEASE, UNSPECIFIED WHETHER ESOPHAGITIS PRESENT: ICD-10-CM

## 2022-01-12 PROCEDURE — 99214 OFFICE O/P EST MOD 30 MIN: CPT | Performed by: INTERNAL MEDICINE

## 2022-01-12 NOTE — PROGRESS NOTES
Subjective   Arun Painting is a 46 y.o. male.     Chief Complaint   Patient presents with   • Hypertension     management   • Cough     x3 days   • Chills     x2 days ago   Hyperlipidemia, ulcerative colitis    History of Present Illness   Patient here follow-up for hypertension, hyperlipidemia, ulcerative colitis.  His chest pain has resolved.  He has seen Dr. Casper.  He has also seen Dr. Sinclair recently.  No nausea vomiting shortness of breath.  Complains of dry cough for last 3 days, chills 2 days ago none after that.  No fever.  No nausea vomiting loss of taste.  He is not vaccinated for COVID  The following portions of the patient's history were reviewed and updated as appropriate: allergies, current medications, past family history, past medical history, past social history, past surgical history and problem list.    Review of Systems   Constitutional: Negative for activity change, appetite change, fatigue and fever.   Eyes: Negative for blurred vision and double vision.   Respiratory: Negative.  Negative for shortness of breath.    Cardiovascular: Negative for chest pain, palpitations and leg swelling.   Gastrointestinal: Negative.    Genitourinary: Negative for hematuria.   Neurological: Negative for dizziness, syncope, light-headedness and headache.       Allergies   Allergen Reactions   • Asacol [Mesalamine] Rash       Current Outpatient Medications on File Prior to Visit   Medication Sig Dispense Refill   • amLODIPine (NORVASC) 5 MG tablet TAKE ONE TABLET BY MOUTH DAILY 30 tablet 5   • mesalamine (Apriso) 0.375 g 24 hr capsule Take 1 capsule by mouth 4 (Four) Times a Day. 360 capsule 3   • omeprazole (priLOSEC) 40 MG capsule TAKE ONE CAPSULE BY MOUTH DAILY 90 capsule 3   • colchicine 0.6 MG tablet TAKE ONE TABLET BY MOUTH TWICE A DAY (Patient taking differently: Take 0.6 mg by mouth As Needed.) 20 tablet 0     No current facility-administered medications on file prior to visit.       Family  "History   Problem Relation Age of Onset   • Colon polyps Mother    • Diabetes Father    • Colon cancer Neg Hx        Past Medical History:   Diagnosis Date   • Arthritis     right shoulder   • Esophagitis    • Gastritis    • GERD (gastroesophageal reflux disease)    • Hiatal hernia    • Hypertension    • Ulcerative colitis (HCC)        Past Surgical History:   Procedure Laterality Date   • COLONOSCOPY  12/18/2013    stool, mild inflammation, mild-moderate chronic colitis w/crypt abscess    • COLONOSCOPY N/A 10/11/2017    Procedure: COLONOSCOPY  w/ biopsies; polypectomy;  Surgeon: Arlyn Cam MD;  Location: [facility] OR;  Service:    • COLONOSCOPY N/A 10/30/2019    Procedure: COLONOSCOPY with biopsies;  Surgeon: Arlyn Cam MD;  Location: [facility] OR;  Service: Gastroenterology   • UPPER GASTROINTESTINAL ENDOSCOPY  08/07/2013    z line irregular, 38 cm from incisors, HH, gastritis, active esophagitis w/changes suggestive of an adjacent erosion, no h-pylorino Curran's       Social History     Socioeconomic History   • Marital status:    Tobacco Use   • Smoking status: Never Smoker   • Smokeless tobacco: Never Used   Vaping Use   • Vaping Use: Never used   Substance and Sexual Activity   • Alcohol use: Yes     Alcohol/week: 6.0 standard drinks     Types: 6 Cans of beer per week     Comment: weekly   • Drug use: No   • Sexual activity: Defer       Patient Active Problem List   Diagnosis   • Ulcerative colitis without complications (HCC)   • Hypertension   • Atheroma, skin   • Mixed hyperlipidemia   • Personal history of colonic polyps   • Family history of polyps in the colon   • Idiopathic chronic gout of right foot   • GERD (gastroesophageal reflux disease)   • Hiatal hernia       /92   Pulse 96   Temp 96.9 °F (36.1 °C)   Resp 16   Ht 177.8 cm (70\")   Wt 111 kg (244 lb)   SpO2 95%   BMI 35.01 kg/m²   Body mass index is 35.01 kg/m².    Objective   Physical Exam  Vitals and nursing note reviewed. "   Constitutional:       Appearance: He is well-developed.   Eyes:      Pupils: Pupils are equal, round, and reactive to light.   Neck:      Thyroid: No thyromegaly.      Vascular: No JVD.      Trachea: No tracheal deviation.   Cardiovascular:      Rate and Rhythm: Normal rate and regular rhythm.      Heart sounds: Normal heart sounds. No murmur heard.  No friction rub. No gallop.    Pulmonary:      Effort: Pulmonary effort is normal. No respiratory distress.      Breath sounds: Normal breath sounds. No wheezing.   Abdominal:      General: Bowel sounds are normal. There is no distension.      Palpations: Abdomen is soft. There is no mass.      Tenderness: There is no abdominal tenderness. There is no guarding or rebound.      Hernia: No hernia is present.   Musculoskeletal:         General: Normal range of motion.      Cervical back: Normal range of motion and neck supple.   Lymphadenopathy:      Cervical: No cervical adenopathy.   Neurological:      General: No focal deficit present.      Mental Status: He is alert and oriented to person, place, and time. Mental status is at baseline.   Psychiatric:         Behavior: Behavior normal.           Assessment/Plan   Diagnoses and all orders for this visit:    1. Primary hypertension (Primary)  -     CBC & Differential  -     Comprehensive Metabolic Panel  -     Lipid Panel With / Chol / HDL Ratio  -     Hepatitis C Antibody    2. Mixed hyperlipidemia  -     CBC & Differential  -     Comprehensive Metabolic Panel  -     Lipid Panel With / Chol / HDL Ratio  -     Hepatitis C Antibody    3. Gastroesophageal reflux disease, unspecified whether esophagitis present  -     CBC & Differential  -     Comprehensive Metabolic Panel  -     Lipid Panel With / Chol / HDL Ratio  -     Hepatitis C Antibody    4. Viral upper respiratory tract infection  -     COVID-19,[facility] ROUTINE, NP/OP SWAB IN TRANSPORT MEDIA OR ESWAB 72 HR TAT - Swab, Anterior nasal    Continue diet and exercise.   Continue current medication Norvasc Prilosec and mesalamine.  Continue checking his blood pressure.  Return in 3 months time.  Patient has cough and congestion.  COVID 19 PCR was ordered.  Patient is aware may take 1 to 4 days for the test.  Patient reports he has access to COVID-19 antigen test he will get it done.  At this time he does not need any medication.  I will see him back in 3 months time.  Rest of his problems are chronic and stable. Patient is not vaccinated for COVID-19 does not want the shots  EHR dragon/transcription disclaimer:  Part of this note are created by electronic transcription/translation of spoken language to printed text and thus may lead to erroneous, or at times, nonsensical words or phrases inadvertently transcribed.  Although I have reviewed for such errors, some may still exist.

## 2022-01-13 LAB
ALBUMIN SERPL-MCNC: 4.2 G/DL (ref 4–5)
ALBUMIN/GLOB SERPL: 1.3 {RATIO} (ref 1.2–2.2)
ALP SERPL-CCNC: 126 IU/L (ref 44–121)
ALT SERPL-CCNC: 34 IU/L (ref 0–44)
AST SERPL-CCNC: 29 IU/L (ref 0–40)
BASOPHILS # BLD AUTO: 0 X10E3/UL (ref 0–0.2)
BASOPHILS NFR BLD AUTO: 1 %
BILIRUB SERPL-MCNC: 0.3 MG/DL (ref 0–1.2)
BUN SERPL-MCNC: 7 MG/DL (ref 6–24)
BUN/CREAT SERPL: 8 (ref 9–20)
CALCIUM SERPL-MCNC: 9.1 MG/DL (ref 8.7–10.2)
CHLORIDE SERPL-SCNC: 103 MMOL/L (ref 96–106)
CHOLEST SERPL-MCNC: 185 MG/DL (ref 100–199)
CHOLEST/HDLC SERPL: 5.8 RATIO (ref 0–5)
CO2 SERPL-SCNC: 22 MMOL/L (ref 20–29)
CREAT SERPL-MCNC: 0.84 MG/DL (ref 0.76–1.27)
EOSINOPHIL # BLD AUTO: 0 X10E3/UL (ref 0–0.4)
EOSINOPHIL NFR BLD AUTO: 0 %
ERYTHROCYTE [DISTWIDTH] IN BLOOD BY AUTOMATED COUNT: 11.7 % (ref 11.6–15.4)
GLOBULIN SER CALC-MCNC: 3.2 G/DL (ref 1.5–4.5)
GLUCOSE SERPL-MCNC: 81 MG/DL (ref 65–99)
HCT VFR BLD AUTO: 40.1 % (ref 37.5–51)
HCV AB S/CO SERPL IA: <0.1 S/CO RATIO (ref 0–0.9)
HDLC SERPL-MCNC: 32 MG/DL
HGB BLD-MCNC: 14.1 G/DL (ref 13–17.7)
IMM GRANULOCYTES # BLD AUTO: 0 X10E3/UL (ref 0–0.1)
IMM GRANULOCYTES NFR BLD AUTO: 0 %
LDLC SERPL CALC-MCNC: 131 MG/DL (ref 0–99)
LYMPHOCYTES # BLD AUTO: 1 X10E3/UL (ref 0.7–3.1)
LYMPHOCYTES NFR BLD AUTO: 31 %
MCH RBC QN AUTO: 29.7 PG (ref 26.6–33)
MCHC RBC AUTO-ENTMCNC: 35.2 G/DL (ref 31.5–35.7)
MCV RBC AUTO: 84 FL (ref 79–97)
MONOCYTES # BLD AUTO: 0.7 X10E3/UL (ref 0.1–0.9)
MONOCYTES NFR BLD AUTO: 22 %
MORPHOLOGY BLD-IMP: ABNORMAL
NEUTROPHILS # BLD AUTO: 1.5 X10E3/UL (ref 1.4–7)
NEUTROPHILS NFR BLD AUTO: 46 %
PLATELET # BLD AUTO: 190 X10E3/UL (ref 150–450)
POTASSIUM SERPL-SCNC: 3.8 MMOL/L (ref 3.5–5.2)
PROT SERPL-MCNC: 7.4 G/DL (ref 6–8.5)
RBC # BLD AUTO: 4.75 X10E6/UL (ref 4.14–5.8)
SODIUM SERPL-SCNC: 138 MMOL/L (ref 134–144)
TRIGL SERPL-MCNC: 118 MG/DL (ref 0–149)
VLDLC SERPL CALC-MCNC: 22 MG/DL (ref 5–40)
WBC # BLD AUTO: 3.2 X10E3/UL (ref 3.4–10.8)

## 2022-01-14 LAB
LABCORP SARS-COV-2, NAA 2 DAY TAT: NORMAL
SARS-COV-2 RNA RESP QL NAA+PROBE: DETECTED

## 2022-02-24 RX ORDER — MESALAMINE 0.38 G/1
CAPSULE, EXTENDED RELEASE ORAL
Qty: 360 CAPSULE | Refills: 3 | Status: SHIPPED | OUTPATIENT
Start: 2022-02-24

## 2022-04-12 RX ORDER — COLCHICINE 0.6 MG/1
0.6 TABLET ORAL 2 TIMES DAILY
Qty: 20 TABLET | Refills: 0 | Status: SHIPPED | OUTPATIENT
Start: 2022-04-12 | End: 2023-01-04 | Stop reason: SDUPTHER

## 2022-06-01 ENCOUNTER — OFFICE VISIT (OUTPATIENT)
Dept: FAMILY MEDICINE CLINIC | Facility: CLINIC | Age: 47
End: 2022-06-01

## 2022-06-01 VITALS
WEIGHT: 245 LBS | SYSTOLIC BLOOD PRESSURE: 130 MMHG | TEMPERATURE: 98.7 F | DIASTOLIC BLOOD PRESSURE: 94 MMHG | BODY MASS INDEX: 35.07 KG/M2 | HEIGHT: 70 IN | RESPIRATION RATE: 16 BRPM | OXYGEN SATURATION: 95 % | HEART RATE: 76 BPM

## 2022-06-01 DIAGNOSIS — K51.90 ULCERATIVE COLITIS WITHOUT COMPLICATIONS, UNSPECIFIED LOCATION: ICD-10-CM

## 2022-06-01 DIAGNOSIS — I10 PRIMARY HYPERTENSION: Primary | ICD-10-CM

## 2022-06-01 DIAGNOSIS — K21.9 GASTROESOPHAGEAL REFLUX DISEASE, UNSPECIFIED WHETHER ESOPHAGITIS PRESENT: ICD-10-CM

## 2022-06-01 DIAGNOSIS — E78.2 MIXED HYPERLIPIDEMIA: ICD-10-CM

## 2022-06-01 DIAGNOSIS — R22.1 THROAT SWELLING: ICD-10-CM

## 2022-06-01 DIAGNOSIS — M10.9 GOUT, UNSPECIFIED CAUSE, UNSPECIFIED CHRONICITY, UNSPECIFIED SITE: ICD-10-CM

## 2022-06-01 PROCEDURE — 99214 OFFICE O/P EST MOD 30 MIN: CPT | Performed by: INTERNAL MEDICINE

## 2022-06-01 RX ORDER — LOSARTAN POTASSIUM 25 MG/1
25 TABLET ORAL DAILY
Qty: 90 TABLET | Refills: 3 | Status: SHIPPED | OUTPATIENT
Start: 2022-06-01 | End: 2022-09-21 | Stop reason: SDUPTHER

## 2022-06-01 NOTE — PROGRESS NOTES
Subjective   Arun Painting is a 46 y.o. male.     Chief Complaint   Patient presents with   • Hypertension   Hyperlipidemia, ulcerative colitis, GERD, gout    History of Present Illness   Patient here follow-up for hypertension, hyperlipidemia, ulcerative colitis, GERD, gout.  No chest pain shortness of breath.  Taking current medications.  Blood pressure is high at home 2.  Reports for the last 6 months feels like his throat swells up and gets better on his own.  No nausea vomiting.  No dysphagia or odynophagia.  Patient also reports bunion on his toe, he did not think it was a gout flareup as colchicine did not help him last time.  But is much better now.  The following portions of the patient's history were reviewed and updated as appropriate: allergies, current medications, past family history, past medical history, past social history, past surgical history and problem list.    Review of Systems   Constitutional: Negative for activity change, appetite change, fatigue and fever.   Eyes: Negative for blurred vision and double vision.   Respiratory: Negative.  Negative for shortness of breath.    Cardiovascular: Negative for chest pain, palpitations and leg swelling.   Gastrointestinal: Negative.    Neurological: Negative for dizziness, syncope, light-headedness and headache.   Psychiatric/Behavioral: Negative for agitation.       Allergies   Allergen Reactions   • Asacol [Mesalamine] Rash       Current Outpatient Medications on File Prior to Visit   Medication Sig Dispense Refill   • amLODIPine (NORVASC) 5 MG tablet TAKE ONE TABLET BY MOUTH DAILY 30 tablet 5   • colchicine 0.6 MG tablet Take 1 tablet by mouth 2 (Two) Times a Day. 20 tablet 0   • mesalamine (APRISO) 0.375 g 24 hr capsule TAKE ONE CAPSULE BY MOUTH FOUR TIMES A  capsule 3   • omeprazole (priLOSEC) 40 MG capsule TAKE ONE CAPSULE BY MOUTH DAILY 90 capsule 3     No current facility-administered medications on file prior to visit.  "      Family History   Problem Relation Age of Onset   • Colon polyps Mother    • Diabetes Father    • Colon cancer Neg Hx        Past Medical History:   Diagnosis Date   • Arthritis     right shoulder   • Esophagitis    • Gastritis    • GERD (gastroesophageal reflux disease)    • Hiatal hernia    • Hypertension    • Ulcerative colitis (HCC)        Past Surgical History:   Procedure Laterality Date   • COLONOSCOPY  12/18/2013    stool, mild inflammation, mild-moderate chronic colitis w/crypt abscess    • COLONOSCOPY N/A 10/11/2017    Procedure: COLONOSCOPY  w/ biopsies; polypectomy;  Surgeon: Arlyn Cam MD;  Location: [facility] OR;  Service:    • COLONOSCOPY N/A 10/30/2019    Procedure: COLONOSCOPY with biopsies;  Surgeon: Arlyn Cam MD;  Location: [facility] OR;  Service: Gastroenterology   • UPPER GASTROINTESTINAL ENDOSCOPY  08/07/2013    z line irregular, 38 cm from incisors, HH, gastritis, active esophagitis w/changes suggestive of an adjacent erosion, no h-pylorino Curran's       Social History     Socioeconomic History   • Marital status:    Tobacco Use   • Smoking status: Never Smoker   • Smokeless tobacco: Never Used   Vaping Use   • Vaping Use: Never used   Substance and Sexual Activity   • Alcohol use: Yes     Alcohol/week: 6.0 standard drinks     Types: 6 Cans of beer per week     Comment: weekly   • Drug use: No   • Sexual activity: Defer       Patient Active Problem List   Diagnosis   • Ulcerative colitis without complications (HCC)   • Hypertension   • Atheroma, skin   • Mixed hyperlipidemia   • Personal history of colonic polyps   • Family history of polyps in the colon   • Idiopathic chronic gout of right foot   • GERD (gastroesophageal reflux disease)   • Hiatal hernia       /94 (BP Location: Left arm, Patient Position: Sitting, Cuff Size: Large Adult)   Pulse 76   Temp 98.7 °F (37.1 °C) (Temporal)   Resp 16   Ht 177.8 cm (70\")   Wt 111 kg (245 lb)   SpO2 95%   BMI 35.15 kg/m² "   Body mass index is 35.15 kg/m².    Objective   Physical Exam  Vitals and nursing note reviewed.   Constitutional:       Appearance: He is well-developed.   HENT:      Mouth/Throat:      Mouth: Mucous membranes are dry.      Pharynx: Oropharynx is clear. No oropharyngeal exudate or posterior oropharyngeal erythema.   Eyes:      Extraocular Movements: Extraocular movements intact.      Pupils: Pupils are equal, round, and reactive to light.   Neck:      Thyroid: No thyromegaly.      Vascular: No JVD.      Trachea: No tracheal deviation.   Cardiovascular:      Rate and Rhythm: Normal rate and regular rhythm.      Heart sounds: Normal heart sounds. No murmur heard.    No friction rub. No gallop.   Pulmonary:      Effort: Pulmonary effort is normal. No respiratory distress.      Breath sounds: Normal breath sounds. No wheezing.   Abdominal:      General: Bowel sounds are normal. There is no distension.      Palpations: Abdomen is soft. There is no mass.      Tenderness: There is no abdominal tenderness. There is no guarding or rebound.      Hernia: No hernia is present.   Musculoskeletal:         General: Normal range of motion.      Cervical back: Normal range of motion and neck supple.   Lymphadenopathy:      Cervical: No cervical adenopathy.   Neurological:      General: No focal deficit present.      Mental Status: He is alert and oriented to person, place, and time. Mental status is at baseline.   Psychiatric:         Mood and Affect: Mood normal.         Behavior: Behavior normal.           Assessment & Plan   Diagnoses and all orders for this visit:    1. Primary hypertension (Primary)  -     CBC & Differential  -     Comprehensive Metabolic Panel  -     Lipid Panel With / Chol / HDL Ratio  -     TSH    2. Mixed hyperlipidemia  -     CBC & Differential  -     Comprehensive Metabolic Panel  -     Lipid Panel With / Chol / HDL Ratio  -     TSH    3. Ulcerative colitis without complications, unspecified location  (HCC)  -     CBC & Differential  -     Comprehensive Metabolic Panel  -     Lipid Panel With / Chol / HDL Ratio  -     TSH    4. Gastroesophageal reflux disease, unspecified whether esophagitis present  -     CBC & Differential  -     Comprehensive Metabolic Panel  -     Lipid Panel With / Chol / HDL Ratio  -     TSH    5. Gout, unspecified cause, unspecified chronicity, unspecified site  -     Uric Acid    6. Throat swelling  -     Ambulatory Referral to ENT (Otolaryngology)    Other orders  -     losartan (Cozaar) 25 MG tablet; Take 1 tablet by mouth Daily.  Dispense: 90 tablet; Refill: 3    Check blood pressure at home.  He is aware that losartan rarely can cause more swelling in the throat.  If it happens stop it.  His swelling of the throat on and off most likely either from GERD or allergies.  He is going to start taking Allegra daily.  ENT referral.  Patient already scheduled for a scope.  Continue with Norvasc, losartan is in addition.  Continue colchicine, misalignment, Prilosec is helping his GERD symptoms.  Rest of her problems are chronic stable.  Discussed with patient and need to see him prefer 6 weeks, or at least within a 3 months time.  Check blood pressure at home.  Patient can see podiatrist of her choice if needed.  EHR dragon/transcription disclaimer:  Part of this note are created by electronic transcription/translation of spoken language to printed text and thus may lead to erroneous, or at times, nonsensical words or phrases inadvertently transcribed.  Although I have reviewed for such errors, some may still exist.

## 2022-06-02 LAB
ALBUMIN SERPL-MCNC: 4.4 G/DL (ref 4–5)
ALBUMIN/GLOB SERPL: 1.6 {RATIO} (ref 1.2–2.2)
ALP SERPL-CCNC: 108 IU/L (ref 44–121)
ALT SERPL-CCNC: 24 IU/L (ref 0–44)
AST SERPL-CCNC: 24 IU/L (ref 0–40)
BASOPHILS # BLD AUTO: 0 X10E3/UL (ref 0–0.2)
BASOPHILS NFR BLD AUTO: 1 %
BILIRUB SERPL-MCNC: 0.5 MG/DL (ref 0–1.2)
BUN SERPL-MCNC: 10 MG/DL (ref 6–24)
BUN/CREAT SERPL: 10 (ref 9–20)
CALCIUM SERPL-MCNC: 9.4 MG/DL (ref 8.7–10.2)
CHLORIDE SERPL-SCNC: 104 MMOL/L (ref 96–106)
CHOLEST SERPL-MCNC: 196 MG/DL (ref 100–199)
CHOLEST/HDLC SERPL: 4.6 RATIO (ref 0–5)
CO2 SERPL-SCNC: 19 MMOL/L (ref 20–29)
CREAT SERPL-MCNC: 0.97 MG/DL (ref 0.76–1.27)
EGFRCR SERPLBLD CKD-EPI 2021: 97 ML/MIN/1.73
EOSINOPHIL # BLD AUTO: 0.2 X10E3/UL (ref 0–0.4)
EOSINOPHIL NFR BLD AUTO: 2 %
ERYTHROCYTE [DISTWIDTH] IN BLOOD BY AUTOMATED COUNT: 12.5 % (ref 11.6–15.4)
GLOBULIN SER CALC-MCNC: 2.8 G/DL (ref 1.5–4.5)
GLUCOSE SERPL-MCNC: 80 MG/DL (ref 65–99)
HCT VFR BLD AUTO: 40.2 % (ref 37.5–51)
HDLC SERPL-MCNC: 43 MG/DL
HGB BLD-MCNC: 13.6 G/DL (ref 13–17.7)
IMM GRANULOCYTES # BLD AUTO: 0 X10E3/UL (ref 0–0.1)
IMM GRANULOCYTES NFR BLD AUTO: 0 %
LDLC SERPL CALC-MCNC: 129 MG/DL (ref 0–99)
LYMPHOCYTES # BLD AUTO: 2.6 X10E3/UL (ref 0.7–3.1)
LYMPHOCYTES NFR BLD AUTO: 34 %
MCH RBC QN AUTO: 29.6 PG (ref 26.6–33)
MCHC RBC AUTO-ENTMCNC: 33.8 G/DL (ref 31.5–35.7)
MCV RBC AUTO: 88 FL (ref 79–97)
MONOCYTES # BLD AUTO: 0.7 X10E3/UL (ref 0.1–0.9)
MONOCYTES NFR BLD AUTO: 9 %
NEUTROPHILS # BLD AUTO: 4.3 X10E3/UL (ref 1.4–7)
NEUTROPHILS NFR BLD AUTO: 54 %
PLATELET # BLD AUTO: 229 X10E3/UL (ref 150–450)
POTASSIUM SERPL-SCNC: 3.9 MMOL/L (ref 3.5–5.2)
PROT SERPL-MCNC: 7.2 G/DL (ref 6–8.5)
RBC # BLD AUTO: 4.59 X10E6/UL (ref 4.14–5.8)
SODIUM SERPL-SCNC: 138 MMOL/L (ref 134–144)
TRIGL SERPL-MCNC: 135 MG/DL (ref 0–149)
TSH SERPL DL<=0.005 MIU/L-ACNC: 1 UIU/ML (ref 0.45–4.5)
URATE SERPL-MCNC: 10.1 MG/DL (ref 3.8–8.4)
VLDLC SERPL CALC-MCNC: 24 MG/DL (ref 5–40)
WBC # BLD AUTO: 7.8 X10E3/UL (ref 3.4–10.8)

## 2022-06-02 RX ORDER — ALLOPURINOL 100 MG/1
200 TABLET ORAL DAILY
Qty: 60 TABLET | Refills: 3 | Status: SHIPPED | OUTPATIENT
Start: 2022-06-02

## 2022-08-01 RX ORDER — AMLODIPINE BESYLATE 5 MG/1
5 TABLET ORAL DAILY
Qty: 30 TABLET | Refills: 5 | Status: SHIPPED | OUTPATIENT
Start: 2022-08-01 | End: 2023-01-04 | Stop reason: SDUPTHER

## 2022-09-21 ENCOUNTER — OFFICE VISIT (OUTPATIENT)
Dept: FAMILY MEDICINE CLINIC | Facility: CLINIC | Age: 47
End: 2022-09-21

## 2022-09-21 VITALS
BODY MASS INDEX: 35.07 KG/M2 | RESPIRATION RATE: 16 BRPM | HEIGHT: 70 IN | SYSTOLIC BLOOD PRESSURE: 138 MMHG | DIASTOLIC BLOOD PRESSURE: 84 MMHG | OXYGEN SATURATION: 98 % | HEART RATE: 77 BPM | WEIGHT: 245 LBS

## 2022-09-21 DIAGNOSIS — K21.9 GASTROESOPHAGEAL REFLUX DISEASE, UNSPECIFIED WHETHER ESOPHAGITIS PRESENT: ICD-10-CM

## 2022-09-21 DIAGNOSIS — E78.2 MIXED HYPERLIPIDEMIA: ICD-10-CM

## 2022-09-21 DIAGNOSIS — I10 PRIMARY HYPERTENSION: Primary | ICD-10-CM

## 2022-09-21 DIAGNOSIS — M1A.0710 IDIOPATHIC CHRONIC GOUT OF RIGHT FOOT WITHOUT TOPHUS: ICD-10-CM

## 2022-09-21 PROCEDURE — 99214 OFFICE O/P EST MOD 30 MIN: CPT | Performed by: INTERNAL MEDICINE

## 2022-09-21 RX ORDER — BACLOFEN 10 MG/1
10 TABLET ORAL NIGHTLY PRN
Qty: 10 TABLET | Refills: 1 | Status: SHIPPED | OUTPATIENT
Start: 2022-09-21

## 2022-09-21 RX ORDER — LOSARTAN POTASSIUM 50 MG/1
50 TABLET ORAL DAILY
Qty: 30 TABLET | Refills: 3 | Status: SHIPPED | OUTPATIENT
Start: 2022-09-21

## 2022-09-21 NOTE — PROGRESS NOTES
Subjective   Arun Painting is a 47 y.o. male.     Chief Complaint   Patient presents with   • Hypertension   Hyperlipidemia, gout, GERD.    History of Present Illness   Here follow-up for hypertension, hyperlipidemia gout GERD.  Taking all his current medications.  Reports she is under stress her blood pressure is high.  Is not checking blood pressure at home.  Cozaar was started last time for his blood pressure.  No gout attack.  Reports he was lifting and moving up furniture he will pull his left upper back, pain in the back.  No radiation.  No chest pain or shortness of breath.  No exertional pain.  Only pain when he moves or lifts.  The following portions of the patient's history were reviewed and updated as appropriate: allergies, current medications, past family history, past medical history, past social history, past surgical history and problem list.    Review of Systems   Constitutional: Negative for activity change, appetite change, fatigue and fever.   Eyes: Negative for blurred vision and double vision.   Respiratory: Negative.  Negative for shortness of breath.    Cardiovascular: Negative for chest pain, palpitations and leg swelling.   Gastrointestinal: Negative.    Genitourinary: Negative for hematuria.   Musculoskeletal: Positive for back pain. Negative for arthralgias.   Neurological: Negative for dizziness, syncope, light-headedness and headache.   Psychiatric/Behavioral: Negative for agitation, self-injury and suicidal ideas.       Allergies   Allergen Reactions   • Asacol [Mesalamine] Rash       Current Outpatient Medications on File Prior to Visit   Medication Sig Dispense Refill   • allopurinol (Zyloprim) 100 MG tablet Take 2 tablets by mouth Daily. 60 tablet 3   • amLODIPine (NORVASC) 5 MG tablet Take 1 tablet by mouth Daily. 30 tablet 5   • colchicine 0.6 MG tablet Take 1 tablet by mouth 2 (Two) Times a Day. 20 tablet 0   • mesalamine (APRISO) 0.375 g 24 hr capsule TAKE ONE CAPSULE BY  MOUTH FOUR TIMES A  capsule 3   • omeprazole (priLOSEC) 40 MG capsule TAKE ONE CAPSULE BY MOUTH DAILY 90 capsule 3   • [DISCONTINUED] losartan (Cozaar) 25 MG tablet Take 1 tablet by mouth Daily. 90 tablet 3     No current facility-administered medications on file prior to visit.       Family History   Problem Relation Age of Onset   • Colon polyps Mother    • Diabetes Father    • Colon cancer Neg Hx        Past Medical History:   Diagnosis Date   • Arthritis     right shoulder   • Esophagitis    • Gastritis    • GERD (gastroesophageal reflux disease)    • Hiatal hernia    • Hypertension    • Ulcerative colitis (HCC)        Past Surgical History:   Procedure Laterality Date   • COLONOSCOPY  12/18/2013    stool, mild inflammation, mild-moderate chronic colitis w/crypt abscess    • COLONOSCOPY N/A 10/11/2017    Procedure: COLONOSCOPY  w/ biopsies; polypectomy;  Surgeon: Arlyn Cam MD;  Location: [facility] OR;  Service:    • COLONOSCOPY N/A 10/30/2019    Procedure: COLONOSCOPY with biopsies;  Surgeon: Arlyn Cam MD;  Location: [facility] OR;  Service: Gastroenterology   • UPPER GASTROINTESTINAL ENDOSCOPY  08/07/2013    z line irregular, 38 cm from incisors, HH, gastritis, active esophagitis w/changes suggestive of an adjacent erosion, no h-pylorino Curran's       Social History     Socioeconomic History   • Marital status:    Tobacco Use   • Smoking status: Never Smoker   • Smokeless tobacco: Never Used   Vaping Use   • Vaping Use: Never used   Substance and Sexual Activity   • Alcohol use: Yes     Alcohol/week: 6.0 standard drinks     Types: 6 Cans of beer per week     Comment: weekly   • Drug use: No   • Sexual activity: Defer       Patient Active Problem List   Diagnosis   • Ulcerative colitis without complications (HCC)   • Hypertension   • Atheroma, skin   • Mixed hyperlipidemia   • Personal history of colonic polyps   • Family history of polyps in the colon   • Idiopathic chronic gout of right  "foot   • GERD (gastroesophageal reflux disease)   • Hiatal hernia       /84 (BP Location: Left arm, Patient Position: Sitting, Cuff Size: Large Adult)   Pulse 77   Resp 16   Ht 177.8 cm (70\")   Wt 111 kg (245 lb)   SpO2 98%   BMI 35.15 kg/m²   Body mass index is 35.15 kg/m².    Objective   Physical Exam  Vitals and nursing note reviewed.   Constitutional:       Appearance: He is well-developed.   Eyes:      Pupils: Pupils are equal, round, and reactive to light.   Neck:      Thyroid: No thyromegaly.      Vascular: No JVD.      Trachea: No tracheal deviation.   Cardiovascular:      Rate and Rhythm: Normal rate and regular rhythm.      Heart sounds: Normal heart sounds. No murmur heard.    No friction rub. No gallop.   Pulmonary:      Effort: Pulmonary effort is normal. No respiratory distress.      Breath sounds: Normal breath sounds. No wheezing.   Abdominal:      General: Bowel sounds are normal. There is no distension.      Palpations: Abdomen is soft. There is no mass.      Tenderness: There is no abdominal tenderness. There is no guarding or rebound.      Hernia: No hernia is present.   Musculoskeletal:         General: Normal range of motion.      Cervical back: Normal range of motion and neck supple.      Comments: Other tender to palpation on the left trapezius, pain reproducible to palpation   Lymphadenopathy:      Cervical: No cervical adenopathy.   Neurological:      General: No focal deficit present.      Mental Status: He is alert and oriented to person, place, and time. Mental status is at baseline.   Psychiatric:         Mood and Affect: Mood normal.         Behavior: Behavior normal.           Assessment & Plan   Diagnoses and all orders for this visit:    1. Primary hypertension (Primary)  -     CBC & Differential  -     Comprehensive Metabolic Panel  -     Lipid Panel With / Chol / HDL Ratio    2. Mixed hyperlipidemia  -     CBC & Differential  -     Comprehensive Metabolic Panel  -     " Lipid Panel With / Chol / HDL Ratio    3. Gastroesophageal reflux disease, unspecified whether esophagitis present  -     CBC & Differential  -     Comprehensive Metabolic Panel  -     Lipid Panel With / Chol / HDL Ratio    4. Idiopathic chronic gout of right foot without tophus  -     Uric Acid    Other orders  -     losartan (COZAAR) 50 MG tablet; Take 1 tablet by mouth Daily.  Dispense: 30 tablet; Refill: 3  -     baclofen (LIORESAL) 10 MG tablet; Take 1 tablet by mouth At Night As Needed for Muscle Spasms.  Dispense: 10 tablet; Refill: 1    Continue diet and exercise.  Check blood pressure at home.  Increase losartan to 50.  Continue allopurinol Norvasc, Prilosec is helping his GERD.  He is also on mesalamine.  Follow-up with GI.  Discussed with patient if he start having chest pain or shortness of breath or fever he needs to proceed to the ER.  If is not getting better he is going to come back.  Otherwise 3 months.  Rest of his problems are chronic and stable.  Check blood pressure at home.  EHR dragon/transcription disclaimer:  Part of this note are created by electronic transcription/translation of spoken language to printed text and thus may lead to erroneous, or at times, nonsensical words or phrases inadvertently transcribed.  Although I have reviewed for such errors, some may still exist.

## 2022-09-22 LAB
ALBUMIN SERPL-MCNC: 4.4 G/DL (ref 4–5)
ALBUMIN/GLOB SERPL: 1.5 {RATIO} (ref 1.2–2.2)
ALP SERPL-CCNC: 90 IU/L (ref 44–121)
ALT SERPL-CCNC: 23 IU/L (ref 0–44)
AST SERPL-CCNC: 23 IU/L (ref 0–40)
BASOPHILS # BLD AUTO: 0 X10E3/UL (ref 0–0.2)
BASOPHILS NFR BLD AUTO: 1 %
BILIRUB SERPL-MCNC: 0.8 MG/DL (ref 0–1.2)
BUN SERPL-MCNC: 12 MG/DL (ref 6–24)
BUN/CREAT SERPL: 15 (ref 9–20)
CALCIUM SERPL-MCNC: 9.4 MG/DL (ref 8.7–10.2)
CHLORIDE SERPL-SCNC: 102 MMOL/L (ref 96–106)
CHOLEST SERPL-MCNC: 214 MG/DL (ref 100–199)
CHOLEST/HDLC SERPL: 5.8 RATIO (ref 0–5)
CO2 SERPL-SCNC: 21 MMOL/L (ref 20–29)
CREAT SERPL-MCNC: 0.81 MG/DL (ref 0.76–1.27)
EGFRCR SERPLBLD CKD-EPI 2021: 109 ML/MIN/1.73
EOSINOPHIL # BLD AUTO: 0.2 X10E3/UL (ref 0–0.4)
EOSINOPHIL NFR BLD AUTO: 3 %
ERYTHROCYTE [DISTWIDTH] IN BLOOD BY AUTOMATED COUNT: 12.6 % (ref 11.6–15.4)
GLOBULIN SER CALC-MCNC: 3 G/DL (ref 1.5–4.5)
GLUCOSE SERPL-MCNC: 99 MG/DL (ref 65–99)
HCT VFR BLD AUTO: 41.6 % (ref 37.5–51)
HDLC SERPL-MCNC: 37 MG/DL
HGB BLD-MCNC: 13.6 G/DL (ref 13–17.7)
IMM GRANULOCYTES # BLD AUTO: 0 X10E3/UL (ref 0–0.1)
IMM GRANULOCYTES NFR BLD AUTO: 0 %
LDLC SERPL CALC-MCNC: 137 MG/DL (ref 0–99)
LYMPHOCYTES # BLD AUTO: 2.2 X10E3/UL (ref 0.7–3.1)
LYMPHOCYTES NFR BLD AUTO: 36 %
MCH RBC QN AUTO: 29.1 PG (ref 26.6–33)
MCHC RBC AUTO-ENTMCNC: 32.7 G/DL (ref 31.5–35.7)
MCV RBC AUTO: 89 FL (ref 79–97)
MONOCYTES # BLD AUTO: 0.5 X10E3/UL (ref 0.1–0.9)
MONOCYTES NFR BLD AUTO: 8 %
NEUTROPHILS # BLD AUTO: 3.3 X10E3/UL (ref 1.4–7)
NEUTROPHILS NFR BLD AUTO: 52 %
PLATELET # BLD AUTO: 225 X10E3/UL (ref 150–450)
POTASSIUM SERPL-SCNC: 3.7 MMOL/L (ref 3.5–5.2)
PROT SERPL-MCNC: 7.4 G/DL (ref 6–8.5)
RBC # BLD AUTO: 4.68 X10E6/UL (ref 4.14–5.8)
SODIUM SERPL-SCNC: 137 MMOL/L (ref 134–144)
TRIGL SERPL-MCNC: 220 MG/DL (ref 0–149)
URATE SERPL-MCNC: 9.5 MG/DL (ref 3.8–8.4)
VLDLC SERPL CALC-MCNC: 40 MG/DL (ref 5–40)
WBC # BLD AUTO: 6.1 X10E3/UL (ref 3.4–10.8)

## 2022-10-12 ENCOUNTER — TELEPHONE (OUTPATIENT)
Dept: GASTROENTEROLOGY | Facility: CLINIC | Age: 47
End: 2022-10-12

## 2022-10-12 NOTE — TELEPHONE ENCOUNTER
FAST TRACK - 2 YEAR RECALL 10/2022  (SHOULD HAVE BEEN 10/21/2021)  LAST COLONOSCOPY 10/30/2019 BY DR. CESAR  PERSONAL HISTORY POLYPS  FAMILY HISTORY POLYPS, MOTHER & FATHER  SCHEDULE AT [facility]    DID MARIELOS:  DIARRHEA [facility] HEART BURN.

## 2022-10-13 RX ORDER — OMEPRAZOLE 40 MG/1
CAPSULE, DELAYED RELEASE ORAL
Qty: 90 CAPSULE | Refills: 0 | Status: SHIPPED | OUTPATIENT
Start: 2022-10-13 | End: 2023-01-04 | Stop reason: SDUPTHER

## 2022-10-13 NOTE — TELEPHONE ENCOUNTER
Calling to check on his paperwork.  Went ahead and scheduled.    Scheduled at [place] 03/15/2023 at 1:15pm - arrive 12pm.  Will mail instructions.

## 2022-10-14 ENCOUNTER — PREP FOR SURGERY (OUTPATIENT)
Dept: OTHER | Facility: HOSPITAL | Age: 47
End: 2022-10-14

## 2022-10-14 DIAGNOSIS — K51.90 ULCERATIVE COLITIS WITHOUT COMPLICATIONS, UNSPECIFIED LOCATION: Primary | ICD-10-CM

## 2022-12-14 ENCOUNTER — OFFICE VISIT (OUTPATIENT)
Dept: FAMILY MEDICINE CLINIC | Facility: CLINIC | Age: 47
End: 2022-12-14

## 2022-12-14 VITALS
TEMPERATURE: 98.7 F | WEIGHT: 300 LBS | RESPIRATION RATE: 20 BRPM | HEIGHT: 70 IN | HEART RATE: 77 BPM | DIASTOLIC BLOOD PRESSURE: 88 MMHG | OXYGEN SATURATION: 97 % | SYSTOLIC BLOOD PRESSURE: 132 MMHG | BODY MASS INDEX: 42.95 KG/M2

## 2022-12-14 DIAGNOSIS — M1A.0710 IDIOPATHIC CHRONIC GOUT OF RIGHT FOOT WITHOUT TOPHUS: ICD-10-CM

## 2022-12-14 DIAGNOSIS — R06.83 SNORING: ICD-10-CM

## 2022-12-14 DIAGNOSIS — K21.9 GASTROESOPHAGEAL REFLUX DISEASE, UNSPECIFIED WHETHER ESOPHAGITIS PRESENT: ICD-10-CM

## 2022-12-14 DIAGNOSIS — E78.2 MIXED HYPERLIPIDEMIA: ICD-10-CM

## 2022-12-14 DIAGNOSIS — I10 PRIMARY HYPERTENSION: Primary | ICD-10-CM

## 2022-12-14 PROCEDURE — 99214 OFFICE O/P EST MOD 30 MIN: CPT | Performed by: INTERNAL MEDICINE

## 2022-12-14 NOTE — PROGRESS NOTES
Subjective   Arun Painting is a 47 y.o. male.     Chief Complaint   Patient presents with   • Hypertension   GERD, ulcerative colitis, gout    History of Present Illness   Patient here follow-up for hypertension, GERD, ulcerative colitis, gout.  Denies any chest pain shortness of breath.  Also has hyperlipidemia.  Was not taking allopurinol regularly.  Scheduled for colonoscopy in March 2023.  There were no openings.  Reports his wife is reporting that he is noting more these days.  Reports she had been snoring since he was a kid but got worse.  No daytime somnolence.  The following portions of the patient's history were reviewed and updated as appropriate: allergies, current medications, past family history, past medical history, past social history, past surgical history and problem list.    Review of Systems   Constitutional: Negative for activity change, appetite change, fatigue and fever.   Eyes: Negative for blurred vision and double vision.   Respiratory: Negative.  Negative for shortness of breath.    Cardiovascular: Negative for chest pain, palpitations and leg swelling.   Gastrointestinal: Negative.    Genitourinary: Negative for hematuria.   Neurological: Negative for dizziness, syncope, light-headedness and headache.       Allergies   Allergen Reactions   • Asacol [Mesalamine] Rash       Current Outpatient Medications on File Prior to Visit   Medication Sig Dispense Refill   • allopurinol (Zyloprim) 100 MG tablet Take 2 tablets by mouth Daily. 60 tablet 3   • amLODIPine (NORVASC) 5 MG tablet Take 1 tablet by mouth Daily. 30 tablet 5   • colchicine 0.6 MG tablet Take 1 tablet by mouth 2 (Two) Times a Day. 20 tablet 0   • losartan (COZAAR) 50 MG tablet Take 1 tablet by mouth Daily. 30 tablet 3   • mesalamine (APRISO) 0.375 g 24 hr capsule TAKE ONE CAPSULE BY MOUTH FOUR TIMES A  capsule 3   • omeprazole (priLOSEC) 40 MG capsule TAKE ONE CAPSULE BY MOUTH DAILY 90 capsule 0   • baclofen (LIORESAL)  10 MG tablet Take 1 tablet by mouth At Night As Needed for Muscle Spasms. 10 tablet 1     No current facility-administered medications on file prior to visit.       Family History   Problem Relation Age of Onset   • Colon polyps Mother    • Diabetes Father    • Colon cancer Neg Hx        Past Medical History:   Diagnosis Date   • Arthritis     right shoulder   • Esophagitis    • Gastritis    • GERD (gastroesophageal reflux disease)    • Hiatal hernia    • Hypertension    • Ulcerative colitis (HCC)        Past Surgical History:   Procedure Laterality Date   • COLONOSCOPY  12/18/2013    stool, mild inflammation, mild-moderate chronic colitis w/crypt abscess    • COLONOSCOPY N/A 10/11/2017    Procedure: COLONOSCOPY  w/ biopsies; polypectomy;  Surgeon: Arlyn Cam MD;  Location[facility];  Service:    • COLONOSCOPY N/A 10/30/2019    Procedure: COLONOSCOPY with biopsies;  Surgeon: Alryn Cam MD;  Location: [facility];  Service: Gastroenterology   • UPPER GASTROINTESTINAL ENDOSCOPY  08/07/2013    z line irregular, 38 cm from incisors, HH, gastritis, active esophagitis w/changes suggestive of an adjacent erosion, no h-pylorino Curran's       Social History     Socioeconomic History   • Marital status:    Tobacco Use   • Smoking status: Never   • Smokeless tobacco: Never   Vaping Use   • Vaping Use: Never used   Substance and Sexual Activity   • Alcohol use: Yes     Alcohol/week: 6.0 standard drinks     Types: 6 Cans of beer per week     Comment: weekly   • Drug use: No   • Sexual activity: Defer       Patient Active Problem List   Diagnosis   • Ulcerative colitis without complications (HCC)   • Hypertension   • Atheroma, skin   • Mixed hyperlipidemia   • Personal history of colonic polyps   • Family history of polyps in the colon   • Idiopathic chronic gout of right foot   • GERD (gastroesophageal reflux disease)   • Hiatal hernia       /88 (BP Location: Left arm, Patient Position: Sitting, Cuff Size:  "Large Adult)   Pulse 77   Temp 98.7 °F (37.1 °C) (Temporal)   Resp 20   Ht 177.8 cm (70\")   Wt 136 kg (300 lb)   SpO2 97%   BMI 43.05 kg/m²   Body mass index is 43.05 kg/m².    Objective   Physical Exam  Vitals and nursing note reviewed.   Constitutional:       Appearance: He is well-developed.   Eyes:      Extraocular Movements: Extraocular movements intact.      Pupils: Pupils are equal, round, and reactive to light.   Neck:      Thyroid: No thyromegaly.      Vascular: No JVD.      Trachea: No tracheal deviation.   Cardiovascular:      Rate and Rhythm: Normal rate and regular rhythm.      Heart sounds: Normal heart sounds. No murmur heard.    No friction rub. No gallop.   Pulmonary:      Effort: Pulmonary effort is normal. No respiratory distress.      Breath sounds: Normal breath sounds. No wheezing.   Abdominal:      General: Bowel sounds are normal. There is no distension.      Palpations: Abdomen is soft. There is no mass.      Tenderness: There is no abdominal tenderness. There is no guarding or rebound.      Hernia: No hernia is present.   Musculoskeletal:         General: Normal range of motion.      Cervical back: Normal range of motion and neck supple.   Lymphadenopathy:      Cervical: No cervical adenopathy.   Neurological:      General: No focal deficit present.      Mental Status: He is alert and oriented to person, place, and time. Mental status is at baseline.      Cranial Nerves: No cranial nerve deficit.      Sensory: No sensory deficit.      Motor: No weakness.      Coordination: Coordination normal.   Psychiatric:         Mood and Affect: Mood normal.         Behavior: Behavior normal.           Assessment & Plan   Diagnoses and all orders for this visit:    1. Primary hypertension (Primary)  -     CBC & Differential  -     Comprehensive Metabolic Panel  -     Lipid Panel With / Chol / HDL Ratio  -     TSH  -     Uric Acid    2. Mixed hyperlipidemia  -     CBC & Differential  -     " Comprehensive Metabolic Panel  -     Lipid Panel With / Chol / HDL Ratio  -     TSH  -     Uric Acid    3. Gastroesophageal reflux disease, unspecified whether esophagitis present  -     CBC & Differential  -     Comprehensive Metabolic Panel  -     Lipid Panel With / Chol / HDL Ratio  -     TSH  -     Uric Acid    4. Idiopathic chronic gout of right foot without tophus  -     CBC & Differential  -     Comprehensive Metabolic Panel  -     Lipid Panel With / Chol / HDL Ratio  -     TSH  -     Uric Acid    5. Snoring    Discussed with patient regarding snoring he needs sleep apnea testing done.  Patient wants to wait.  It is not affecting his life, he is aware that people can die in the sleep with sleep apnea if not treated.  Continue taking Norvasc, Prilosec is helping his GERD symptoms, Cozaar, allopurinol.  Keep follow-up for colonoscopy.  I will see him back in 3 months time.  Patient to decide regarding sleep apnea testing and let me know.  Rest of his problems are chronic and stable.  EHR dragon/transcription disclaimer:  Part of this note are created by electronic transcription/translation of spoken language to printed text and thus may lead to erroneous, or at times, nonsensical words or phrases inadvertently transcribed.  Although I have reviewed for such errors, some may still exist.

## 2022-12-15 LAB
ALBUMIN SERPL-MCNC: 4.5 G/DL (ref 3.5–5.2)
ALBUMIN/GLOB SERPL: 1.6 G/DL
ALP SERPL-CCNC: 94 U/L (ref 39–117)
ALT SERPL-CCNC: 22 U/L (ref 1–41)
AST SERPL-CCNC: 19 U/L (ref 1–40)
BASOPHILS # BLD AUTO: 0.04 10*3/MM3 (ref 0–0.2)
BASOPHILS NFR BLD AUTO: 0.5 % (ref 0–1.5)
BILIRUB SERPL-MCNC: 0.6 MG/DL (ref 0–1.2)
BUN SERPL-MCNC: 11 MG/DL (ref 6–20)
BUN/CREAT SERPL: 11.7 (ref 7–25)
CALCIUM SERPL-MCNC: 9.4 MG/DL (ref 8.6–10.5)
CHLORIDE SERPL-SCNC: 102 MMOL/L (ref 98–107)
CHOLEST SERPL-MCNC: 194 MG/DL (ref 0–200)
CHOLEST/HDLC SERPL: 4.62 {RATIO}
CO2 SERPL-SCNC: 26.1 MMOL/L (ref 22–29)
CREAT SERPL-MCNC: 0.94 MG/DL (ref 0.76–1.27)
EGFRCR SERPLBLD CKD-EPI 2021: 100 ML/MIN/1.73
EOSINOPHIL # BLD AUTO: 0.16 10*3/MM3 (ref 0–0.4)
EOSINOPHIL NFR BLD AUTO: 2 % (ref 0.3–6.2)
ERYTHROCYTE [DISTWIDTH] IN BLOOD BY AUTOMATED COUNT: 12.3 % (ref 12.3–15.4)
GLOBULIN SER CALC-MCNC: 2.8 GM/DL
GLUCOSE SERPL-MCNC: 79 MG/DL (ref 65–99)
HCT VFR BLD AUTO: 40.3 % (ref 37.5–51)
HDLC SERPL-MCNC: 42 MG/DL (ref 40–60)
HGB BLD-MCNC: 13.8 G/DL (ref 13–17.7)
IMM GRANULOCYTES # BLD AUTO: 0.02 10*3/MM3 (ref 0–0.05)
IMM GRANULOCYTES NFR BLD AUTO: 0.3 % (ref 0–0.5)
LDLC SERPL CALC-MCNC: 119 MG/DL (ref 0–100)
LYMPHOCYTES # BLD AUTO: 2.71 10*3/MM3 (ref 0.7–3.1)
LYMPHOCYTES NFR BLD AUTO: 33.9 % (ref 19.6–45.3)
MCH RBC QN AUTO: 30.2 PG (ref 26.6–33)
MCHC RBC AUTO-ENTMCNC: 34.2 G/DL (ref 31.5–35.7)
MCV RBC AUTO: 88.2 FL (ref 79–97)
MONOCYTES # BLD AUTO: 0.69 10*3/MM3 (ref 0.1–0.9)
MONOCYTES NFR BLD AUTO: 8.6 % (ref 5–12)
NEUTROPHILS # BLD AUTO: 4.37 10*3/MM3 (ref 1.7–7)
NEUTROPHILS NFR BLD AUTO: 54.7 % (ref 42.7–76)
NRBC BLD AUTO-RTO: 0 /100 WBC (ref 0–0.2)
PLATELET # BLD AUTO: 227 10*3/MM3 (ref 140–450)
POTASSIUM SERPL-SCNC: 3.8 MMOL/L (ref 3.5–5.2)
PROT SERPL-MCNC: 7.3 G/DL (ref 6–8.5)
RBC # BLD AUTO: 4.57 10*6/MM3 (ref 4.14–5.8)
SODIUM SERPL-SCNC: 138 MMOL/L (ref 136–145)
TRIGL SERPL-MCNC: 185 MG/DL (ref 0–150)
TSH SERPL DL<=0.005 MIU/L-ACNC: 1.02 UIU/ML (ref 0.27–4.2)
URATE SERPL-MCNC: 8.9 MG/DL (ref 3.4–7)
VLDLC SERPL CALC-MCNC: 33 MG/DL (ref 5–40)
WBC # BLD AUTO: 7.99 10*3/MM3 (ref 3.4–10.8)

## 2023-01-04 RX ORDER — AMLODIPINE BESYLATE 5 MG/1
5 TABLET ORAL DAILY
Qty: 30 TABLET | Refills: 5 | Status: SHIPPED | OUTPATIENT
Start: 2023-01-04 | End: 2023-03-06 | Stop reason: SDUPTHER

## 2023-01-04 RX ORDER — COLCHICINE 0.6 MG/1
0.6 TABLET ORAL 2 TIMES DAILY
Qty: 20 TABLET | Refills: 0 | Status: SHIPPED | OUTPATIENT
Start: 2023-01-04

## 2023-01-04 RX ORDER — OMEPRAZOLE 40 MG/1
40 CAPSULE, DELAYED RELEASE ORAL DAILY
Qty: 90 CAPSULE | Refills: 0 | Status: SHIPPED | OUTPATIENT
Start: 2023-01-04

## 2023-02-10 ENCOUNTER — TELEPHONE (OUTPATIENT)
Dept: GASTROENTEROLOGY | Facility: CLINIC | Age: 48
End: 2023-02-10
Payer: COMMERCIAL

## 2023-02-10 NOTE — TELEPHONE ENCOUNTER
Unable to locate his prep instructions.  Scheduled for colonoscopy on 03/15/2023.  Please mail other copy.

## 2023-03-06 RX ORDER — AMLODIPINE BESYLATE 5 MG/1
5 TABLET ORAL DAILY
Qty: 30 TABLET | Refills: 5 | Status: SHIPPED | OUTPATIENT
Start: 2023-03-06

## 2023-03-14 ENCOUNTER — ANESTHESIA EVENT (OUTPATIENT)
Dept: PERIOP | Facility: HOSPITAL | Age: 48
End: 2023-03-14
Payer: COMMERCIAL

## 2023-03-15 ENCOUNTER — HOSPITAL ENCOUNTER (OUTPATIENT)
Facility: HOSPITAL | Age: 48
Setting detail: HOSPITAL OUTPATIENT SURGERY
Discharge: HOME | End: 2023-03-15
Attending: INTERNAL MEDICINE | Admitting: INTERNAL MEDICINE
Payer: COMMERCIAL

## 2023-03-15 ENCOUNTER — ANESTHESIA (OUTPATIENT)
Dept: PERIOP | Facility: HOSPITAL | Age: 48
End: 2023-03-15
Payer: COMMERCIAL

## 2023-03-15 VITALS
HEART RATE: 74 BPM | OXYGEN SATURATION: 97 % | TEMPERATURE: 98.1 F | BODY MASS INDEX: 35.33 KG/M2 | SYSTOLIC BLOOD PRESSURE: 127 MMHG | WEIGHT: 246.2 LBS | DIASTOLIC BLOOD PRESSURE: 98 MMHG | RESPIRATION RATE: 18 BRPM

## 2023-03-15 DIAGNOSIS — K51.90 ULCERATIVE COLITIS WITHOUT COMPLICATIONS, UNSPECIFIED LOCATION: ICD-10-CM

## 2023-03-15 PROCEDURE — 88305 TISSUE EXAM BY PATHOLOGIST: CPT | Performed by: INTERNAL MEDICINE

## 2023-03-15 PROCEDURE — 45380 COLONOSCOPY AND BIOPSY: CPT | Performed by: INTERNAL MEDICINE

## 2023-03-15 PROCEDURE — 25010000002 PROPOFOL 200 MG/20ML EMULSION: Performed by: ANESTHESIOLOGY

## 2023-03-15 RX ORDER — SODIUM CHLORIDE 9 MG/ML
40 INJECTION, SOLUTION INTRAVENOUS AS NEEDED
Status: DISCONTINUED | OUTPATIENT
Start: 2023-03-15 | End: 2023-03-15 | Stop reason: HOSPADM

## 2023-03-15 RX ORDER — LIDOCAINE HYDROCHLORIDE 10 MG/ML
0.5 INJECTION, SOLUTION EPIDURAL; INFILTRATION; INTRACAUDAL; PERINEURAL ONCE AS NEEDED
Status: DISCONTINUED | OUTPATIENT
Start: 2023-03-15 | End: 2023-03-15 | Stop reason: HOSPADM

## 2023-03-15 RX ORDER — SODIUM CHLORIDE, SODIUM LACTATE, POTASSIUM CHLORIDE, CALCIUM CHLORIDE 600; 310; 30; 20 MG/100ML; MG/100ML; MG/100ML; MG/100ML
9 INJECTION, SOLUTION INTRAVENOUS CONTINUOUS PRN
Status: DISCONTINUED | OUTPATIENT
Start: 2023-03-15 | End: 2023-03-15 | Stop reason: HOSPADM

## 2023-03-15 RX ORDER — MAGNESIUM HYDROXIDE 1200 MG/15ML
LIQUID ORAL AS NEEDED
Status: DISCONTINUED | OUTPATIENT
Start: 2023-03-15 | End: 2023-03-15 | Stop reason: HOSPADM

## 2023-03-15 RX ORDER — SODIUM CHLORIDE 0.9 % (FLUSH) 0.9 %
10 SYRINGE (ML) INJECTION EVERY 12 HOURS SCHEDULED
Status: DISCONTINUED | OUTPATIENT
Start: 2023-03-15 | End: 2023-03-15 | Stop reason: HOSPADM

## 2023-03-15 RX ORDER — PROPOFOL 10 MG/ML
INJECTION, EMULSION INTRAVENOUS AS NEEDED
Status: DISCONTINUED | OUTPATIENT
Start: 2023-03-15 | End: 2023-03-15 | Stop reason: SURG

## 2023-03-15 RX ORDER — SODIUM CHLORIDE 0.9 % (FLUSH) 0.9 %
10 SYRINGE (ML) INJECTION AS NEEDED
Status: DISCONTINUED | OUTPATIENT
Start: 2023-03-15 | End: 2023-03-15 | Stop reason: HOSPADM

## 2023-03-15 RX ADMIN — SODIUM CHLORIDE, POTASSIUM CHLORIDE, SODIUM LACTATE AND CALCIUM CHLORIDE: 600; 310; 30; 20 INJECTION, SOLUTION INTRAVENOUS at 13:53

## 2023-03-15 RX ADMIN — PROPOFOL 380 MG: 10 INJECTION, EMULSION INTRAVENOUS at 14:00

## 2023-03-15 NOTE — ANESTHESIA POSTPROCEDURE EVALUATION
Patient: Arun Painting    Procedure Summary     Date: 03/15/23 Room / Location: [facility] ENDOSCOPY 1 /  [facility]    Anesthesia Start: 1353 Anesthesia Stop: 1420    Procedure: COLONOSCOPY WITH BIOPSY Diagnosis:       Ulcerative colitis without complications, unspecified location (HCC)      (Ulcerative colitis without complications, unspecified location (HCC) [K51.90])    Surgeons: Michael Sinclair MD Provider: Jannie Burger MD    Anesthesia Type: MAC ASA Status: 2          Anesthesia Type: MAC    Vitals  Vitals Value Taken Time   /98 03/15/23 1446   Temp     Pulse 74 03/15/23 1446   Resp 18 03/15/23 1440   SpO2 97 % 03/15/23 1446           Post Anesthesia Care and Evaluation    Patient location during evaluation: bedside  Patient participation: complete - patient participated  Level of consciousness: awake and alert  Pain score: 0  Pain management: adequate    Airway patency: patent  Anesthetic complications: No anesthetic complications  PONV Status: none  Cardiovascular status: acceptable  Respiratory status: acceptable  Hydration status: acceptable  No anesthesia care post op

## 2023-03-15 NOTE — BRIEF OP NOTE
COLONOSCOPY WITH BIOPSY  Progress Note    Arun Painting  3/15/2023    Pre-op Diagnosis:   Ulcerative colitis without complications, unspecified location (HCC) [K51.90]       Post-Op Diagnosis Codes:     * Ulcerative colitis without complications, unspecified location (HCC) [K51.90]    Procedure/CPT® Codes:        Procedure(s):  COLONOSCOPY WITH BIOPSY        Surgeon(s):  Michael Sinclair MD    Anesthesia: Monitored Anesthesia Care    Staff:   Circulator: Reina Singer RN  Scrub Person: Tiffany Hogan         Estimated Blood Loss: none    Urine Voided: * No values recorded between 3/15/2023  1:54 PM and 3/15/2023  2:16 PM *    Specimens:                Specimens     ID Source Type Tests Collected By Collected At Frozen?    A Large Intestine, Right / Ascending Colon Tissue · TISSUE PATHOLOGY EXAM   Reina Singer, RN 3/15/23 1403     Description: right colon biopsy    This specimen was not marked as sent.    B Large Intestine, Rectum Tissue · TISSUE PATHOLOGY EXAM   Reina Singer RN 3/15/23 1403     Description: rectal biopsy    This specimen was not marked as sent.    C Large Intestine, Transverse Colon Tissue · TISSUE PATHOLOGY EXAM   Michael Sinclair MD 3/15/23 1403     Description: transverse colon biopsy    This specimen was not marked as sent.    D Large Intestine, Sigmoid Colon Tissue · TISSUE PATHOLOGY EXAM   Reina Singer RN 3/15/23 1403     Description: sigmoid colon biopsy    This specimen was not marked as sent.                Drains: * No LDAs found *    Findings: Colon to TI good prep  Normal Mucosa thru-out   Micro-Colitis Biopsy (R,T,Sig, rectum)        Complications: None          Michael Sinclair MD     Date: 3/15/2023  Time: 14:19 EDT

## 2023-03-15 NOTE — H&P
Patient Care Team:  Michael Sinclair MD as PCP - General (Gastroenterology)  Karen Jaffe MD as PCP - Family Medicine    CHIEF COMPLAINT: Screening CRC, Family hx of CRC or polyps and Longstanding UC    HISTORY OF PRESENT ILLNESS:  Last exam was 2019- By Dr Cam    Past Medical History:   Diagnosis Date   • Arthritis     right shoulder   • Esophagitis    • Gastritis    • GERD (gastroesophageal reflux disease)    • Gout    • Hiatal hernia    • Hypertension    • Ulcerative colitis (HCC)      Past Surgical History:   Procedure Laterality Date   • COLONOSCOPY  12/18/2013    stool, mild inflammation, mild-moderate chronic colitis w/crypt abscess    • COLONOSCOPY N/A 10/11/2017    Procedure: COLONOSCOPY  w/ biopsies; polypectomy;  Surgeon: Arlyn Cam MD;  Location: [facility] OR;  Service:    • COLONOSCOPY N/A 10/30/2019    Procedure: COLONOSCOPY with biopsies;  Surgeon: Arlyn Cam MD;  Location: [facility] OR;  Service: Gastroenterology   • PILONIDAL CYST / SINUS EXCISION N/A     recurring     Sinus excision done twice   now coming back   • UPPER GASTROINTESTINAL ENDOSCOPY  08/07/2013    z line irregular, 38 cm from incisors, HH, gastritis, active esophagitis w/changes suggestive of an adjacent erosion, no h-pylorino Curran's     Family History   Problem Relation Age of Onset   • Colon polyps Mother    • Diabetes Father    • Colon cancer Neg Hx      Social History     Tobacco Use   • Smoking status: Never   • Smokeless tobacco: Never   • Tobacco comments:     Tried a couple of cigs as a youngster   No habit    Vaping Use   • Vaping Use: Never used   Substance Use Topics   • Alcohol use: Yes     Alcohol/week: 6.0 standard drinks     Types: 6 Cans of beer per week     Comment: weekly   • Drug use: No     Medications Prior to Admission   Medication Sig Dispense Refill Last Dose   • allopurinol (Zyloprim) 100 MG tablet Take 2 tablets by mouth Daily. 60 tablet 3 3/13/2023   • amLODIPine (NORVASC) 5 MG  tablet Take 1 tablet by mouth Daily. 30 tablet 5 3/13/2023   • losartan (COZAAR) 50 MG tablet Take 1 tablet by mouth Daily. 30 tablet 3 3/13/2023   • mesalamine (APRISO) 0.375 g 24 hr capsule TAKE ONE CAPSULE BY MOUTH FOUR TIMES A  capsule 3 3/14/2023   • omeprazole (priLOSEC) 40 MG capsule Take 1 capsule by mouth Daily. 90 capsule 0 3/13/2023   • baclofen (LIORESAL) 10 MG tablet Take 1 tablet by mouth At Night As Needed for Muscle Spasms. 10 tablet 1 More than a month   • colchicine 0.6 MG tablet Take 1 tablet by mouth 2 (Two) Times a Day. (Patient taking differently: Take 1 tablet by mouth As Needed.) 20 tablet 0 More than a month     Allergies:  Asacol [mesalamine]    REVIEW OF SYSTEMS:  Please see the above history of present illness for pertinent positives and negatives.  The remainder of the patient's systems have been reviewed and are negative.     Vital Signs  Temp:  [98.1 °F (36.7 °C)] 98.1 °F (36.7 °C)  Heart Rate:  [73-74] 73  Resp:  [15] 15  BP: (134-143)/(100-110) 134/100    Flowsheet Rows    Flowsheet Row First Filed Value   Admission Height --   Admission Weight 112 kg (246 lb 3.2 oz) Documented at 03/15/2023 1207           Physical Exam:  Physical Exam   Constitutional: Patient appears well-developed and well-nourished and in no acute distress   HEENT:   Head: Normocephalic and atraumatic.   Eyes:  Pupils are equal, round, and reactive to light. EOM are intact. Sclerae are anicteric and non-injected.  Mouth and Throat: Patient has moist mucous membranes. Oropharynx is clear of any erythema or exudate.     Neck: Neck supple. No JVD present. No thyromegaly present. No lymphadenopathy present.  Cardiovascular: Regular rate, regular rhythm, S1 normal and S2 normal.  Exam reveals no gallop and no friction rub.  No murmur heard.  Pulmonary/Chest: Lungs are clear to auscultation bilaterally. No respiratory distress. No wheezes. No rhonchi. No rales.   Abdominal: Soft. Bowel sounds are normal. No  distension and no mass. There is no hepatosplenomegaly. There is no tenderness.   Musculoskeletal: Normal Muscle tone  Extremities: No edema. Pulses are palpable in all 4 extremities.  Neurological: Patient is alert and oriented to person, place, and time. Cranial nerves II-XII are grossly intact with no focal deficits.  Skin: Skin is warm. No rash noted. Nails show no clubbing.  No cyanosis or erythema.    Debilities/Disabilities Identified: None  Emotional Behavior: Appropriate     Results Review:   I reviewed the patient's new clinical results.    Lab Results (most recent)     None          Imaging Results (Most Recent)     None        reviewed    ECG/EMG Results (most recent)     None        reviewed    Assessment & Plan   Screening CRC, Family hx of CRC or polyps and UC/  colonoscopy      I discussed the patient's findings and my recommendations with patient.     Michael Sinclair MD  03/15/23  13:11 EDT    Time: 10 min prior to procedure.

## 2023-03-19 LAB
LAB AP CASE REPORT: NORMAL
PATH REPORT.FINAL DX SPEC: NORMAL
PATH REPORT.GROSS SPEC: NORMAL

## 2023-03-22 ENCOUNTER — OFFICE VISIT (OUTPATIENT)
Dept: FAMILY MEDICINE CLINIC | Facility: CLINIC | Age: 48
End: 2023-03-22
Payer: COMMERCIAL

## 2023-03-22 VITALS
HEIGHT: 70 IN | TEMPERATURE: 97.9 F | SYSTOLIC BLOOD PRESSURE: 122 MMHG | BODY MASS INDEX: 35.79 KG/M2 | WEIGHT: 250 LBS | HEART RATE: 95 BPM | OXYGEN SATURATION: 97 % | DIASTOLIC BLOOD PRESSURE: 78 MMHG | RESPIRATION RATE: 16 BRPM

## 2023-03-22 DIAGNOSIS — I10 PRIMARY HYPERTENSION: Primary | ICD-10-CM

## 2023-03-22 DIAGNOSIS — K21.9 GASTROESOPHAGEAL REFLUX DISEASE, UNSPECIFIED WHETHER ESOPHAGITIS PRESENT: ICD-10-CM

## 2023-03-22 DIAGNOSIS — M1A.0710 IDIOPATHIC CHRONIC GOUT OF RIGHT FOOT WITHOUT TOPHUS: ICD-10-CM

## 2023-03-22 DIAGNOSIS — E78.2 MIXED HYPERLIPIDEMIA: ICD-10-CM

## 2023-03-22 PROCEDURE — 99214 OFFICE O/P EST MOD 30 MIN: CPT | Performed by: INTERNAL MEDICINE

## 2023-03-22 NOTE — PROGRESS NOTES
Subjective   Arun Painting is a 47 y.o. male.     Chief Complaint   Patient presents with   • Hypertension   Hyperlipidemia, ulcerative colitis, GERD, gout.    History of Present Illness   Patient seen follow-up for hypertension, hyperlipidemia, ulcerative colitis, GERD, gout.  Denies any chest pain shortness of breath.  Had colonoscopy last week all okay.  Currently taking allopurinol, Norvasc losartan.  Patient is also on mesalamine.  No chest pain shortness of breath.  The following portions of the patient's history were reviewed and updated as appropriate: allergies, current medications, past family history, past medical history, past social history, past surgical history and problem list.    Review of Systems   Constitutional: Negative for activity change, appetite change, fatigue and fever.   Eyes: Negative for blurred vision and double vision.   Respiratory: Negative.  Negative for shortness of breath.    Cardiovascular: Negative for chest pain, palpitations and leg swelling.   Gastrointestinal: Negative.    Genitourinary: Negative for hematuria.   Neurological: Negative for dizziness, syncope, light-headedness and headache.   Psychiatric/Behavioral: Negative for self-injury and suicidal ideas.       Allergies   Allergen Reactions   • Asacol [Mesalamine] Rash       Current Outpatient Medications on File Prior to Visit   Medication Sig Dispense Refill   • allopurinol (Zyloprim) 100 MG tablet Take 2 tablets by mouth Daily. 60 tablet 3   • amLODIPine (NORVASC) 5 MG tablet Take 1 tablet by mouth Daily. 30 tablet 5   • colchicine 0.6 MG tablet Take 1 tablet by mouth 2 (Two) Times a Day. 20 tablet 0   • losartan (COZAAR) 50 MG tablet Take 1 tablet by mouth Daily. 30 tablet 3   • mesalamine (APRISO) 0.375 g 24 hr capsule TAKE ONE CAPSULE BY MOUTH FOUR TIMES A  capsule 3   • omeprazole (priLOSEC) 40 MG capsule Take 1 capsule by mouth Daily. 90 capsule 0   • baclofen (LIORESAL) 10 MG tablet Take 1 tablet by  mouth At Night As Needed for Muscle Spasms. 10 tablet 1     No current facility-administered medications on file prior to visit.       Family History   Problem Relation Age of Onset   • Colon polyps Mother    • Diabetes Father    • Colon cancer Neg Hx        Past Medical History:   Diagnosis Date   • Arthritis     right shoulder   • Esophagitis    • Gastritis    • GERD (gastroesophageal reflux disease)    • Gout    • Hiatal hernia    • Hypertension    • Ulcerative colitis (HCC)        Past Surgical History:   Procedure Laterality Date   • COLONOSCOPY  12/18/2013    stool, mild inflammation, mild-moderate chronic colitis w/crypt abscess    • COLONOSCOPY N/A 10/11/2017    Procedure: COLONOSCOPY  w/ biopsies; polypectomy;  Surgeon: Arlyn Cam MD;  Location: [facility] OR;  Service:    • COLONOSCOPY N/A 10/30/2019    Procedure: COLONOSCOPY with biopsies;  Surgeon: Arlyn Cam MD;  Location: [facility] OR;  Service: Gastroenterology   • COLONOSCOPY N/A 3/15/2023    Procedure: COLONOSCOPY WITH BIOPSY;  Surgeon: Michael Sinclair MD;  Location: [facility] OR;  Service: Gastroenterology;  Laterality: N/A;  ulcerative colitis  biopsies:   right colon  transverse colon  sigmoid colon  rectal colon   • PILONIDAL CYST / SINUS EXCISION N/A     recurring     Sinus excision done twice   now coming back   • UPPER GASTROINTESTINAL ENDOSCOPY  08/07/2013    z line irregular, 38 cm from incisors, HH, gastritis, active esophagitis w/changes suggestive of an adjacent erosion, no h-pylorino Curran's       Social History     Socioeconomic History   • Marital status:    Tobacco Use   • Smoking status: Never   • Smokeless tobacco: Never   • Tobacco comments:     Tried a couple of cigs as a youngster   No habit    Vaping Use   • Vaping Use: Never used   Substance and Sexual Activity   • Alcohol use: Yes     Alcohol/week: 6.0 standard drinks     Types: 6 Cans of beer per week     Comment: weekly   • Drug use: No   • Sexual  "activity: Defer       Patient Active Problem List   Diagnosis   • Ulcerative colitis without complications (HCC)   • Hypertension   • Atheroma, skin   • Mixed hyperlipidemia   • Personal history of colonic polyps   • Family history of polyps in the colon   • Idiopathic chronic gout of right foot   • GERD (gastroesophageal reflux disease)   • Hiatal hernia       /78 (BP Location: Left arm, Patient Position: Sitting, Cuff Size: Large Adult)   Pulse 95   Temp 97.9 °F (36.6 °C)   Resp 16   Ht 177.8 cm (70\")   Wt 113 kg (250 lb)   SpO2 97%   BMI 35.87 kg/m²   Body mass index is 35.87 kg/m².    Objective   Physical Exam  Vitals and nursing note reviewed.   Constitutional:       Appearance: He is well-developed.   Eyes:      Pupils: Pupils are equal, round, and reactive to light.   Neck:      Thyroid: No thyromegaly.      Vascular: No JVD.      Trachea: No tracheal deviation.   Cardiovascular:      Rate and Rhythm: Normal rate and regular rhythm.      Heart sounds: Normal heart sounds. No murmur heard.    No friction rub. No gallop.   Pulmonary:      Effort: Pulmonary effort is normal. No respiratory distress.      Breath sounds: Normal breath sounds. No wheezing.   Abdominal:      General: Bowel sounds are normal. There is no distension.      Palpations: Abdomen is soft. There is no mass.      Tenderness: There is no abdominal tenderness. There is no guarding or rebound.      Hernia: No hernia is present.   Musculoskeletal:         General: Normal range of motion.      Cervical back: Normal range of motion and neck supple.   Lymphadenopathy:      Cervical: No cervical adenopathy.   Neurological:      General: No focal deficit present.      Mental Status: He is alert and oriented to person, place, and time. Mental status is at baseline.   Psychiatric:         Mood and Affect: Mood normal.         Behavior: Behavior normal.           Assessment & Plan   Diagnoses and all orders for this visit:    1. Primary " hypertension (Primary)  -     CBC & Differential  -     Comprehensive Metabolic Panel  -     Lipid Panel With / Chol / HDL Ratio  -     TSH    2. Mixed hyperlipidemia  -     CBC & Differential  -     Comprehensive Metabolic Panel  -     Lipid Panel With / Chol / HDL Ratio  -     TSH    3. 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 Gastroesophageal reflux disease, unspecified whether esophagitis present  -     CBC & Differential  -     Comprehensive Metabolic Panel  -     Lipid Panel With / Chol / HDL Ratio  -     TSH    Continue diet and exercise.  Check blood pressure.  Lose weight.  Continue taking allopurinol, amlodipine, Cozaar, mesalamine.  Prilosec is helping his GERD symptoms.  All his problems are chronic and stable.  Return in 3 months time.  EHR dragon/transcription disclaimer:  Part of this note are created by electronic transcription/translation of spoken language to printed text and thus may lead to erroneous, or at times, nonsensical words or phrases inadvertently transcribed.  Although I have reviewed for such errors, some may still exist.

## 2023-03-23 LAB
ALBUMIN SERPL-MCNC: 4.2 G/DL (ref 3.5–5.2)
ALBUMIN/GLOB SERPL: 1.3 G/DL
ALP SERPL-CCNC: 91 U/L (ref 39–117)
ALT SERPL-CCNC: 25 U/L (ref 1–41)
AST SERPL-CCNC: 21 U/L (ref 1–40)
BASOPHILS # BLD AUTO: 0.03 10*3/MM3 (ref 0–0.2)
BASOPHILS NFR BLD AUTO: 0.4 % (ref 0–1.5)
BILIRUB SERPL-MCNC: 0.5 MG/DL (ref 0–1.2)
BUN SERPL-MCNC: 11 MG/DL (ref 6–20)
BUN/CREAT SERPL: 12.6 (ref 7–25)
CALCIUM SERPL-MCNC: 9.9 MG/DL (ref 8.6–10.5)
CHLORIDE SERPL-SCNC: 104 MMOL/L (ref 98–107)
CHOLEST SERPL-MCNC: 205 MG/DL (ref 0–200)
CHOLEST/HDLC SERPL: 6.03 {RATIO}
CO2 SERPL-SCNC: 25.5 MMOL/L (ref 22–29)
CREAT SERPL-MCNC: 0.87 MG/DL (ref 0.76–1.27)
EGFRCR SERPLBLD CKD-EPI 2021: 106.4 ML/MIN/1.73
EOSINOPHIL # BLD AUTO: 0.18 10*3/MM3 (ref 0–0.4)
EOSINOPHIL NFR BLD AUTO: 2.5 % (ref 0.3–6.2)
ERYTHROCYTE [DISTWIDTH] IN BLOOD BY AUTOMATED COUNT: 12.8 % (ref 12.3–15.4)
GLOBULIN SER CALC-MCNC: 3.2 GM/DL
GLUCOSE SERPL-MCNC: 92 MG/DL (ref 65–99)
HCT VFR BLD AUTO: 40.5 % (ref 37.5–51)
HDLC SERPL-MCNC: 34 MG/DL (ref 40–60)
HGB BLD-MCNC: 13.8 G/DL (ref 13–17.7)
IMM GRANULOCYTES # BLD AUTO: 0.02 10*3/MM3 (ref 0–0.05)
IMM GRANULOCYTES NFR BLD AUTO: 0.3 % (ref 0–0.5)
LDLC SERPL CALC-MCNC: 114 MG/DL (ref 0–100)
LYMPHOCYTES # BLD AUTO: 2.49 10*3/MM3 (ref 0.7–3.1)
LYMPHOCYTES NFR BLD AUTO: 34.8 % (ref 19.6–45.3)
MCH RBC QN AUTO: 29.8 PG (ref 26.6–33)
MCHC RBC AUTO-ENTMCNC: 34.1 G/DL (ref 31.5–35.7)
MCV RBC AUTO: 87.5 FL (ref 79–97)
MONOCYTES # BLD AUTO: 0.77 10*3/MM3 (ref 0.1–0.9)
MONOCYTES NFR BLD AUTO: 10.8 % (ref 5–12)
NEUTROPHILS # BLD AUTO: 3.67 10*3/MM3 (ref 1.7–7)
NEUTROPHILS NFR BLD AUTO: 51.2 % (ref 42.7–76)
NRBC BLD AUTO-RTO: 0 /100 WBC (ref 0–0.2)
PLATELET # BLD AUTO: 235 10*3/MM3 (ref 140–450)
POTASSIUM SERPL-SCNC: 3.8 MMOL/L (ref 3.5–5.2)
PROT SERPL-MCNC: 7.4 G/DL (ref 6–8.5)
RBC # BLD AUTO: 4.63 10*6/MM3 (ref 4.14–5.8)
SODIUM SERPL-SCNC: 141 MMOL/L (ref 136–145)
TRIGL SERPL-MCNC: 327 MG/DL (ref 0–150)
TSH SERPL DL<=0.005 MIU/L-ACNC: 0.76 UIU/ML (ref 0.27–4.2)
URATE SERPL-MCNC: 7.6 MG/DL (ref 3.4–7)
VLDLC SERPL CALC-MCNC: 57 MG/DL (ref 5–40)
WBC # BLD AUTO: 7.16 10*3/MM3 (ref 3.4–10.8)

## 2023-04-10 RX ORDER — LOSARTAN POTASSIUM 50 MG/1
50 TABLET ORAL DAILY
Qty: 30 TABLET | Refills: 3 | Status: SHIPPED | OUTPATIENT
Start: 2023-04-10

## 2023-05-01 RX ORDER — OMEPRAZOLE 40 MG/1
40 CAPSULE, DELAYED RELEASE ORAL DAILY
Qty: 90 CAPSULE | Refills: 0 | Status: SHIPPED | OUTPATIENT
Start: 2023-05-01

## 2023-06-14 ENCOUNTER — TELEPHONE (OUTPATIENT)
Dept: FAMILY MEDICINE CLINIC | Facility: CLINIC | Age: 48
End: 2023-06-14
Payer: COMMERCIAL

## 2023-06-14 RX ORDER — MESALAMINE 0.38 G/1
CAPSULE, EXTENDED RELEASE ORAL
Qty: 360 CAPSULE | Refills: 0 | Status: SHIPPED | OUTPATIENT
Start: 2023-06-14

## 2023-06-14 RX ORDER — ALLOPURINOL 100 MG/1
200 TABLET ORAL DAILY
Qty: 60 TABLET | Refills: 3 | Status: SHIPPED | OUTPATIENT
Start: 2023-06-14

## 2023-07-26 ENCOUNTER — OFFICE VISIT (OUTPATIENT)
Dept: FAMILY MEDICINE CLINIC | Facility: CLINIC | Age: 48
End: 2023-07-26
Payer: COMMERCIAL

## 2023-07-26 VITALS
HEIGHT: 70 IN | DIASTOLIC BLOOD PRESSURE: 82 MMHG | BODY MASS INDEX: 35.22 KG/M2 | HEART RATE: 78 BPM | WEIGHT: 246 LBS | TEMPERATURE: 97.4 F | SYSTOLIC BLOOD PRESSURE: 120 MMHG | OXYGEN SATURATION: 97 %

## 2023-07-26 DIAGNOSIS — M1A.0710 IDIOPATHIC CHRONIC GOUT OF RIGHT FOOT WITHOUT TOPHUS: ICD-10-CM

## 2023-07-26 DIAGNOSIS — K21.9 GASTROESOPHAGEAL REFLUX DISEASE, UNSPECIFIED WHETHER ESOPHAGITIS PRESENT: ICD-10-CM

## 2023-07-26 DIAGNOSIS — K51.90 ULCERATIVE COLITIS WITHOUT COMPLICATIONS, UNSPECIFIED LOCATION: ICD-10-CM

## 2023-07-26 DIAGNOSIS — I10 PRIMARY HYPERTENSION: Primary | ICD-10-CM

## 2023-07-26 DIAGNOSIS — E78.2 MIXED HYPERLIPIDEMIA: ICD-10-CM

## 2023-07-26 PROBLEM — Z86.0100 PERSONAL HISTORY OF COLONIC POLYPS: Status: RESOLVED | Noted: 2019-09-23 | Resolved: 2023-07-26

## 2023-07-26 PROBLEM — Z86.010 PERSONAL HISTORY OF COLONIC POLYPS: Status: RESOLVED | Noted: 2019-09-23 | Resolved: 2023-07-26

## 2023-07-26 PROCEDURE — 99214 OFFICE O/P EST MOD 30 MIN: CPT | Performed by: INTERNAL MEDICINE

## 2023-07-26 NOTE — PROGRESS NOTES
Subjective   Arun Painting is a 48 y.o. male.     Chief Complaint   Patient presents with    Hypertension   Hyperlipidemia, gout, GERD, ulcerative colitis    Hypertension  Pertinent negatives include no blurred vision, chest pain, palpitations or shortness of breath.    Here follow-up for hypertension, hyperlipidemia, gout, GERD, ulcerative colitis.  No chest pain or shortness of breath.  No nausea vomiting.  Follows with Dr. Sinclair for ulcerative colitis.  Taking all current medications reviewed with patient.  No flareup of gout.  The following portions of the patient's history were reviewed and updated as appropriate: allergies, current medications, past family history, past medical history, past social history, past surgical history and problem list.    Review of Systems   Constitutional:  Negative for activity change, appetite change, fatigue and fever.   Eyes:  Negative for blurred vision and double vision.   Respiratory: Negative.  Negative for shortness of breath.    Cardiovascular:  Negative for chest pain, palpitations and leg swelling.   Gastrointestinal: Negative.    Neurological:  Negative for dizziness, syncope, light-headedness and headache.   Psychiatric/Behavioral:  Negative for agitation and behavioral problems.      Allergies   Allergen Reactions    Asacol [Mesalamine] Rash       Current Outpatient Medications on File Prior to Visit   Medication Sig Dispense Refill    allopurinol (Zyloprim) 100 MG tablet Take 2 tablets by mouth Daily. 60 tablet 3    amLODIPine (NORVASC) 5 MG tablet Take 1 tablet by mouth Daily. 30 tablet 5    colchicine 0.6 MG tablet Take 1 tablet by mouth 2 (Two) Times a Day. 20 tablet 0    losartan (COZAAR) 50 MG tablet Take 1 tablet by mouth Daily. 30 tablet 3    mesalamine (APRISO) 0.375 g 24 hr capsule TAKE ONE CAPSULE BY MOUTH FOUR TIMES A  capsule 0    omeprazole (priLOSEC) 40 MG capsule Take 1 capsule by mouth Daily. 90 capsule 0    [DISCONTINUED] baclofen  (LIORESAL) 10 MG tablet Take 1 tablet by mouth At Night As Needed for Muscle Spasms. 10 tablet 1     No current facility-administered medications on file prior to visit.       Family History   Problem Relation Age of Onset    Colon polyps Mother     Diabetes Father     Colon cancer Neg Hx        Past Medical History:   Diagnosis Date    Arthritis     right shoulder    Esophagitis     Gastritis     GERD (gastroesophageal reflux disease)     Gout     Hiatal hernia     Hypertension     Ulcerative colitis        Past Surgical History:   Procedure Laterality Date    COLONOSCOPY  12/18/2013    stool, mild inflammation, mild-moderate chronic colitis w/crypt abscess     COLONOSCOPY N/A 10/11/2017    Procedure: COLONOSCOPY  w/ biopsies; polypectomy;  Surgeon: Arlyn Cam MD;  Location: [facility] OR;  Service:     COLONOSCOPY N/A 10/30/2019    Procedure: COLONOSCOPY with biopsies;  Surgeon: Arlyn Cam MD;  Location: [facility] OR;  Service: Gastroenterology    COLONOSCOPY N/A 3/15/2023    Procedure: COLONOSCOPY WITH BIOPSY;  Surgeon: Michael Sinclair MD;  Location: [facility] OR;  Service: Gastroenterology;  Laterality: N/A;  ulcerative colitis  biopsies:   right colon  transverse colon  sigmoid colon  rectal colon    PILONIDAL CYST / SINUS EXCISION N/A     recurring     Sinus excision done twice   now coming back    UPPER GASTROINTESTINAL ENDOSCOPY  08/07/2013    z line irregular, 38 cm from incisors, HH, gastritis, active esophagitis w/changes suggestive of an adjacent erosion, no h-pylorino Curran's       Social History     Socioeconomic History    Marital status:    Tobacco Use    Smoking status: Never    Smokeless tobacco: Never    Tobacco comments:     Tried a couple of cigs as a youngster   No habit    Vaping Use    Vaping Use: Never used   Substance and Sexual Activity    Alcohol use: Yes     Alcohol/week: 6.0 standard drinks     Types: 6 Cans of beer per week     Comment: weekly    Drug use: No     "Sexual activity: Defer       Patient Active Problem List   Diagnosis    Ulcerative colitis without complications    Hypertension    Atheroma, skin    Mixed hyperlipidemia    Family history of polyps in the colon    Idiopathic chronic gout of right foot without tophus    GERD (gastroesophageal reflux disease)    Hiatal hernia       /82 (BP Location: Left arm, Patient Position: Sitting, Cuff Size: Large Adult)   Pulse 78   Temp 97.4 °F (36.3 °C) (Temporal)   Ht 177.8 cm (70\")   Wt 112 kg (246 lb)   SpO2 97%   BMI 35.30 kg/m²   Body mass index is 35.3 kg/m².    Objective   Physical Exam  Vitals and nursing note reviewed.   Constitutional:       Appearance: He is well-developed.   Eyes:      Pupils: Pupils are equal, round, and reactive to light.   Neck:      Thyroid: No thyromegaly.      Vascular: No JVD.      Trachea: No tracheal deviation.   Cardiovascular:      Rate and Rhythm: Normal rate and regular rhythm.      Heart sounds: Normal heart sounds. No murmur heard.    No friction rub. No gallop.   Pulmonary:      Effort: Pulmonary effort is normal. No respiratory distress.      Breath sounds: Normal breath sounds. No wheezing.   Abdominal:      General: Bowel sounds are normal. There is no distension.      Palpations: Abdomen is soft. There is no mass.      Tenderness: There is no abdominal tenderness. There is no guarding or rebound.      Hernia: No hernia is present.   Musculoskeletal:         General: Normal range of motion.      Cervical back: Normal range of motion and neck supple.   Lymphadenopathy:      Cervical: No cervical adenopathy.   Neurological:      Mental Status: He is alert and oriented to person, place, and time. Mental status is at baseline.   Psychiatric:         Mood and Affect: Mood normal.         Behavior: Behavior normal.         Assessment & Plan   Diagnoses and all orders for this visit:    1. Primary hypertension (Primary)  -     CBC & Differential  -     Comprehensive " Metabolic Panel  -     Lipid Panel With / Chol / HDL Ratio  -     TSH  -     Uric Acid    2. Mixed hyperlipidemia  -     CBC & Differential  -     Comprehensive Metabolic Panel  -     Lipid Panel With / Chol / HDL Ratio  -     TSH  -     Uric Acid    3. Ulcerative colitis without complications, unspecified location  -     CBC & Differential  -     Comprehensive Metabolic Panel  -     Lipid Panel With / Chol / HDL Ratio  -     TSH  -     Uric Acid    4. Gastroesophageal reflux disease, unspecified whether esophagitis present    5. Idiopathic chronic gout of right foot without tophus  -     CBC & Differential  -     Comprehensive Metabolic Panel  -     Lipid Panel With / Chol / HDL Ratio  -     TSH  -     Uric Acid    Continue diet and exercise.  Continue taking blood pressure.  Lose weight.  Continue allopurinol, Norvasc, Cozaar, Prilosec.  Prilosec helping his GERD symptoms.  Patient also on mesalamine.  Takes colchicine as needed.  All his problems are chronic and stable.  Return in 3 months time.  Labs at the hospital.  Discussed with patient getting tetanus shot.  Discussed with patient getting calcium scoring scan of the heart.  EHR dragon/transcription disclaimer:  Part of this note are created by electronic transcription/translation of spoken language to printed text and thus may lead to erroneous, or at times, nonsensical words or phrases inadvertently transcribed.  Although I have reviewed for such errors, some may still exist.

## 2023-08-21 RX ORDER — LOSARTAN POTASSIUM 50 MG/1
TABLET ORAL
Qty: 30 TABLET | Refills: 3 | Status: SHIPPED | OUTPATIENT
Start: 2023-08-21

## 2023-08-31 RX ORDER — AMLODIPINE BESYLATE 5 MG/1
5 TABLET ORAL DAILY
Qty: 30 TABLET | Refills: 5 | Status: SHIPPED | OUTPATIENT
Start: 2023-08-31

## 2023-09-18 ENCOUNTER — TELEPHONE (OUTPATIENT)
Dept: GASTROENTEROLOGY | Facility: CLINIC | Age: 48
End: 2023-09-18
Payer: COMMERCIAL

## 2023-09-18 RX ORDER — MESALAMINE 0.38 G/1
CAPSULE, EXTENDED RELEASE ORAL
Qty: 240 CAPSULE | Refills: 0 | Status: SHIPPED | OUTPATIENT
Start: 2023-09-18

## 2023-09-18 RX ORDER — OMEPRAZOLE 40 MG/1
40 CAPSULE, DELAYED RELEASE ORAL DAILY
Qty: 90 CAPSULE | Refills: 1 | Status: SHIPPED | OUTPATIENT
Start: 2023-09-18

## 2023-10-09 RX ORDER — OMEPRAZOLE 40 MG/1
40 CAPSULE, DELAYED RELEASE ORAL DAILY
Qty: 90 CAPSULE | Refills: 1 | OUTPATIENT
Start: 2023-10-09

## 2023-11-01 ENCOUNTER — OFFICE VISIT (OUTPATIENT)
Dept: FAMILY MEDICINE CLINIC | Facility: CLINIC | Age: 48
End: 2023-11-01
Payer: COMMERCIAL

## 2023-11-01 ENCOUNTER — LAB (OUTPATIENT)
Dept: LAB | Facility: HOSPITAL | Age: 48
End: 2023-11-01
Payer: COMMERCIAL

## 2023-11-01 VITALS
BODY MASS INDEX: 35.5 KG/M2 | HEIGHT: 70 IN | SYSTOLIC BLOOD PRESSURE: 122 MMHG | DIASTOLIC BLOOD PRESSURE: 98 MMHG | HEART RATE: 94 BPM | OXYGEN SATURATION: 98 % | WEIGHT: 248 LBS | TEMPERATURE: 97.1 F

## 2023-11-01 DIAGNOSIS — M1A.0710 IDIOPATHIC CHRONIC GOUT OF RIGHT FOOT WITHOUT TOPHUS: ICD-10-CM

## 2023-11-01 DIAGNOSIS — E78.2 MIXED HYPERLIPIDEMIA: ICD-10-CM

## 2023-11-01 DIAGNOSIS — K21.9 GASTROESOPHAGEAL REFLUX DISEASE, UNSPECIFIED WHETHER ESOPHAGITIS PRESENT: ICD-10-CM

## 2023-11-01 DIAGNOSIS — I10 PRIMARY HYPERTENSION: Primary | ICD-10-CM

## 2023-11-01 DIAGNOSIS — K51.90 ULCERATIVE COLITIS WITHOUT COMPLICATIONS, UNSPECIFIED LOCATION: ICD-10-CM

## 2023-11-01 LAB
ALBUMIN SERPL-MCNC: 4.5 G/DL (ref 3.5–5.2)
ALBUMIN/GLOB SERPL: 1.6 G/DL
ALP SERPL-CCNC: 85 U/L (ref 39–117)
ALT SERPL W P-5'-P-CCNC: 26 U/L (ref 1–41)
ANION GAP SERPL CALCULATED.3IONS-SCNC: 11 MMOL/L (ref 5–15)
AST SERPL-CCNC: 23 U/L (ref 1–40)
BASOPHILS # BLD AUTO: 0.03 10*3/MM3 (ref 0–0.2)
BASOPHILS NFR BLD AUTO: 0.5 % (ref 0–1.5)
BILIRUB SERPL-MCNC: 0.7 MG/DL (ref 0–1.2)
BUN SERPL-MCNC: 9 MG/DL (ref 6–20)
BUN/CREAT SERPL: 9.9 (ref 7–25)
CALCIUM SPEC-SCNC: 9.8 MG/DL (ref 8.6–10.5)
CHLORIDE SERPL-SCNC: 104 MMOL/L (ref 98–107)
CHOLEST SERPL-MCNC: 208 MG/DL (ref 0–200)
CO2 SERPL-SCNC: 22 MMOL/L (ref 22–29)
CREAT SERPL-MCNC: 0.91 MG/DL (ref 0.76–1.27)
DEPRECATED RDW RBC AUTO: 37.6 FL (ref 37–54)
EGFRCR SERPLBLD CKD-EPI 2021: 103.3 ML/MIN/1.73
EOSINOPHIL # BLD AUTO: 0.13 10*3/MM3 (ref 0–0.4)
EOSINOPHIL NFR BLD AUTO: 2.1 % (ref 0.3–6.2)
ERYTHROCYTE [DISTWIDTH] IN BLOOD BY AUTOMATED COUNT: 12.1 % (ref 12.3–15.4)
GLOBULIN UR ELPH-MCNC: 2.9 GM/DL
GLUCOSE SERPL-MCNC: 89 MG/DL (ref 65–99)
HCT VFR BLD AUTO: 41.7 % (ref 37.5–51)
HDLC SERPL QL: 5.07
HDLC SERPL-MCNC: 41 MG/DL (ref 40–60)
HGB BLD-MCNC: 14.2 G/DL (ref 13–17.7)
IMM GRANULOCYTES # BLD AUTO: 0.02 10*3/MM3 (ref 0–0.05)
IMM GRANULOCYTES NFR BLD AUTO: 0.3 % (ref 0–0.5)
LDLC SERPL CALC-MCNC: 128 MG/DL (ref 0–100)
LYMPHOCYTES # BLD AUTO: 2.13 10*3/MM3 (ref 0.7–3.1)
LYMPHOCYTES NFR BLD AUTO: 34.2 % (ref 19.6–45.3)
MCH RBC QN AUTO: 29.3 PG (ref 26.6–33)
MCHC RBC AUTO-ENTMCNC: 34.1 G/DL (ref 31.5–35.7)
MCV RBC AUTO: 86.2 FL (ref 79–97)
MONOCYTES # BLD AUTO: 0.55 10*3/MM3 (ref 0.1–0.9)
MONOCYTES NFR BLD AUTO: 8.8 % (ref 5–12)
NEUTROPHILS NFR BLD AUTO: 3.36 10*3/MM3 (ref 1.7–7)
NEUTROPHILS NFR BLD AUTO: 54.1 % (ref 42.7–76)
NRBC BLD AUTO-RTO: 0 /100 WBC (ref 0–0.2)
PLATELET # BLD AUTO: 245 10*3/MM3 (ref 140–450)
PMV BLD AUTO: 10.4 FL (ref 6–12)
POTASSIUM SERPL-SCNC: 3.8 MMOL/L (ref 3.5–5.2)
PROT SERPL-MCNC: 7.4 G/DL (ref 6–8.5)
RBC # BLD AUTO: 4.84 10*6/MM3 (ref 4.14–5.8)
SODIUM SERPL-SCNC: 137 MMOL/L (ref 136–145)
TRIGL SERPL-MCNC: 222 MG/DL (ref 0–150)
TSH SERPL DL<=0.05 MIU/L-ACNC: 0.82 UIU/ML (ref 0.27–4.2)
URATE SERPL-MCNC: 6.3 MG/DL (ref 3.4–7)
VLDLC SERPL-MCNC: 39 MG/DL (ref 5–40)
WBC NRBC COR # BLD: 6.22 10*3/MM3 (ref 3.4–10.8)

## 2023-11-01 PROCEDURE — 80061 LIPID PANEL: CPT | Performed by: INTERNAL MEDICINE

## 2023-11-01 PROCEDURE — 80053 COMPREHEN METABOLIC PANEL: CPT | Performed by: INTERNAL MEDICINE

## 2023-11-01 PROCEDURE — 84443 ASSAY THYROID STIM HORMONE: CPT | Performed by: INTERNAL MEDICINE

## 2023-11-01 PROCEDURE — 99214 OFFICE O/P EST MOD 30 MIN: CPT | Performed by: INTERNAL MEDICINE

## 2023-11-01 PROCEDURE — 84550 ASSAY OF BLOOD/URIC ACID: CPT | Performed by: INTERNAL MEDICINE

## 2023-11-01 PROCEDURE — 85025 COMPLETE CBC W/AUTO DIFF WBC: CPT | Performed by: INTERNAL MEDICINE

## 2023-11-01 RX ORDER — ALLOPURINOL 100 MG/1
200 TABLET ORAL DAILY
Qty: 180 TABLET | Refills: 3 | Status: SHIPPED | OUTPATIENT
Start: 2023-11-01

## 2023-11-01 RX ORDER — OMEPRAZOLE 40 MG/1
40 CAPSULE, DELAYED RELEASE ORAL DAILY
Qty: 90 CAPSULE | Refills: 1 | Status: SHIPPED | OUTPATIENT
Start: 2023-11-01

## 2023-11-01 RX ORDER — MELOXICAM 15 MG/1
15 TABLET ORAL DAILY PRN
COMMUNITY

## 2023-11-01 RX ORDER — AMLODIPINE BESYLATE 5 MG/1
5 TABLET ORAL DAILY
Qty: 90 TABLET | Refills: 5 | Status: SHIPPED | OUTPATIENT
Start: 2023-11-01

## 2023-11-01 RX ORDER — LOSARTAN POTASSIUM 100 MG/1
100 TABLET ORAL DAILY
Qty: 90 TABLET | Refills: 3 | Status: SHIPPED | OUTPATIENT
Start: 2023-11-01

## 2023-11-01 NOTE — PROGRESS NOTES
Subjective   Arun Painting is a 48 y.o. male.     Chief Complaint   Patient presents with    Hypertension    Hyperlipidemia    Gout       History of Present Illness   Patient seen in follow-up for hypertension, hyperlipidemia, gout, ulcerative colitis, GERD.  No chest pain shortness of breath.  Reports blood pressure at home is also little high.  Takes colchicine on and off.  Had a colonoscopy recently.  The following portions of the patient's history were reviewed and updated as appropriate: allergies, current medications, past family history, past medical history, past social history, past surgical history and problem list.    Review of Systems   Constitutional:  Negative for activity change, appetite change, fatigue and fever.   Eyes:  Negative for blurred vision and double vision.   Respiratory: Negative.  Negative for shortness of breath.    Cardiovascular:  Negative for chest pain, palpitations and leg swelling.   Gastrointestinal: Negative.    Genitourinary:  Negative for hematuria.   Neurological:  Negative for dizziness, syncope, light-headedness and headache.       Allergies   Allergen Reactions    Asacol [Mesalamine] Rash       Current Outpatient Medications on File Prior to Visit   Medication Sig Dispense Refill    colchicine 0.6 MG tablet Take 1 tablet by mouth 2 (Two) Times a Day. 20 tablet 0    meloxicam (MOBIC) 15 MG tablet Take 1 tablet by mouth Daily As Needed for Mild Pain.      mesalamine (APRISO) 0.375 g 24 hr capsule TAKE ONE CAPSULE BY MOUTH FOUR TIMES A  capsule 0    [DISCONTINUED] allopurinol (Zyloprim) 100 MG tablet Take 2 tablets by mouth Daily. 60 tablet 3    [DISCONTINUED] amLODIPine (NORVASC) 5 MG tablet Take 1 tablet by mouth Daily. 30 tablet 5    [DISCONTINUED] losartan (COZAAR) 50 MG tablet TAKE ONE TABLET BY MOUTH DAILY 30 tablet 3    [DISCONTINUED] omeprazole (priLOSEC) 40 MG capsule Take 1 capsule by mouth Daily. 90 capsule 1     No current facility-administered  medications on file prior to visit.       Family History   Problem Relation Age of Onset    Colon polyps Mother     Diabetes Father     Colon cancer Neg Hx        Past Medical History:   Diagnosis Date    Arthritis     right shoulder    Esophagitis     Gastritis     GERD (gastroesophageal reflux disease)     Gout     Hiatal hernia     Hypertension     Ulcerative colitis        Past Surgical History:   Procedure Laterality Date    COLONOSCOPY  12/18/2013    stool, mild inflammation, mild-moderate chronic colitis w/crypt abscess     COLONOSCOPY N/A 10/11/2017    Procedure: COLONOSCOPY  w/ biopsies; polypectomy;  Surgeon: Arlyn Cam MD;  Location: [facility] OR;  Service:     COLONOSCOPY N/A 10/30/2019    Procedure: COLONOSCOPY with biopsies;  Surgeon: Arlyn Cam MD;  Location: [facility] OR;  Service: Gastroenterology    COLONOSCOPY N/A 3/15/2023    Procedure: COLONOSCOPY WITH BIOPSY;  Surgeon: Michael Sinclair MD;  Location: [facility] OR;  Service: Gastroenterology;  Laterality: N/A;  ulcerative colitis  biopsies:   right colon  transverse colon  sigmoid colon  rectal colon    PILONIDAL CYST / SINUS EXCISION N/A     recurring     Sinus excision done twice   now coming back    UPPER GASTROINTESTINAL ENDOSCOPY  08/07/2013    z line irregular, 38 cm from incisors, HH, gastritis, active esophagitis w/changes suggestive of an adjacent erosion, no h-pylorino Curran's       Social History     Socioeconomic History    Marital status:    Tobacco Use    Smoking status: Never     Passive exposure: Never    Smokeless tobacco: Never    Tobacco comments:     Tried a couple of cigs as a youngster   No habit    Vaping Use    Vaping Use: Never used   Substance and Sexual Activity    Alcohol use: Yes     Alcohol/week: 6.0 standard drinks of alcohol     Types: 6 Cans of beer per week     Comment: weekly    Drug use: No    Sexual activity: Defer       Patient Active Problem List   Diagnosis    Ulcerative colitis without  "complications    Hypertension    Atheroma, skin    Mixed hyperlipidemia    Family history of polyps in the colon    Idiopathic chronic gout of right foot without tophus    GERD (gastroesophageal reflux disease)    Hiatal hernia       /98   Pulse 94   Temp 97.1 °F (36.2 °C)   Ht 177.8 cm (70\")   Wt 112 kg (248 lb)   SpO2 98%   BMI 35.58 kg/m²   Body mass index is 35.58 kg/m².    Objective   Physical Exam  Vitals and nursing note reviewed.   Constitutional:       Appearance: He is well-developed.   Eyes:      Pupils: Pupils are equal, round, and reactive to light.   Neck:      Thyroid: No thyromegaly.      Vascular: No JVD.      Trachea: No tracheal deviation.   Cardiovascular:      Rate and Rhythm: Normal rate and regular rhythm.      Heart sounds: Normal heart sounds. No murmur heard.     No friction rub. No gallop.   Pulmonary:      Effort: Pulmonary effort is normal. No respiratory distress.      Breath sounds: Normal breath sounds. No wheezing.   Abdominal:      General: Bowel sounds are normal. There is no distension.      Palpations: Abdomen is soft. There is no mass.      Tenderness: There is no abdominal tenderness. There is no guarding or rebound.      Hernia: No hernia is present.   Musculoskeletal:         General: Normal range of motion.      Cervical back: Normal range of motion and neck supple.   Lymphadenopathy:      Cervical: No cervical adenopathy.   Neurological:      General: No focal deficit present.      Mental Status: He is alert and oriented to person, place, and time. Mental status is at baseline.   Psychiatric:         Mood and Affect: Mood normal.         Behavior: Behavior normal.           Assessment & Plan   Diagnoses and all orders for this visit:    1. Primary hypertension (Primary)  -     Comprehensive Metabolic Panel  -     CBC & Differential  -     Lipid Panel With / Chol / HDL Ratio  -     TSH  -     Uric Acid    2. Mixed hyperlipidemia  -     Comprehensive Metabolic " Panel  -     CBC & Differential  -     Lipid Panel With / Chol / HDL Ratio  -     TSH  -     Uric Acid    3. Gastroesophageal reflux disease, unspecified whether esophagitis present  -     Comprehensive Metabolic Panel  -     CBC & Differential  -     Lipid Panel With / Chol / HDL Ratio  -     TSH  -     Uric Acid    4. Ulcerative colitis without complications, unspecified location  -     Comprehensive Metabolic Panel  -     CBC & Differential  -     Lipid Panel With / Chol / HDL Ratio  -     TSH  -     Uric Acid    5. Idiopathic chronic gout of right foot without tophus  -     Comprehensive Metabolic Panel  -     CBC & Differential  -     Lipid Panel With / Chol / HDL Ratio  -     TSH  -     Uric Acid    Other orders  -     allopurinol (Zyloprim) 100 MG tablet; Take 2 tablets by mouth Daily.  Dispense: 180 tablet; Refill: 3  -     amLODIPine (NORVASC) 5 MG tablet; Take 1 tablet by mouth Daily.  Dispense: 90 tablet; Refill: 5  -     omeprazole (priLOSEC) 40 MG capsule; Take 1 capsule by mouth Daily.  Dispense: 90 capsule; Refill: 1  -     losartan (Cozaar) 100 MG tablet; Take 1 tablet by mouth Daily.  Dispense: 90 tablet; Refill: 3    Discussed with patient blood pressure is very high.  When I rechecked it was 140/100.  Increase her losartan to 100 mg a day take it in the evening, stay on Norvasc 5 daily.  New prescription sent.  I will see him back in 4 weeks time.  Check blood pressure at home.  Continue taking Prilosec helping with GERD.  Continue taking amlodipine, Zyloprim, colchicine as needed.  He is also on Apriso.  Keep appointment with Dr. Sinclair.  Other than his blood pressure as of the problems are chronic and stable.  EHR dragon/transcription disclaimer:  Part of this note are created by electronic transcription/translation of spoken language to printed text and thus may lead to erroneous, or at times, nonsensical words or phrases inadvertently transcribed.  Although I have reviewed for such errors,  some may still exist.

## 2023-11-02 ENCOUNTER — TELEPHONE (OUTPATIENT)
Dept: FAMILY MEDICINE CLINIC | Facility: CLINIC | Age: 48
End: 2023-11-02
Payer: COMMERCIAL

## 2023-11-02 NOTE — TELEPHONE ENCOUNTER
Hub staff attempted to follow warm transfer process and was unsuccessful     Caller: Arun Painting    Relationship to patient: Self    Best call back number:     Patient is needing: PATIENT RETURNING A CALL TO [facility] FOR HIS LAB RESULTS.  PLEASE RETURN HIS CALL.

## 2023-11-20 ENCOUNTER — OFFICE VISIT (OUTPATIENT)
Dept: FAMILY MEDICINE CLINIC | Facility: CLINIC | Age: 48
End: 2023-11-20
Payer: COMMERCIAL

## 2023-11-20 VITALS
HEART RATE: 95 BPM | RESPIRATION RATE: 22 BRPM | HEIGHT: 70 IN | BODY MASS INDEX: 35.5 KG/M2 | WEIGHT: 248 LBS | SYSTOLIC BLOOD PRESSURE: 138 MMHG | DIASTOLIC BLOOD PRESSURE: 88 MMHG | TEMPERATURE: 100.2 F | OXYGEN SATURATION: 98 %

## 2023-11-20 DIAGNOSIS — U07.1 COVID-19 VIRUS INFECTION: ICD-10-CM

## 2023-11-20 DIAGNOSIS — Z20.822 EXPOSURE TO COVID-19 VIRUS: Primary | ICD-10-CM

## 2023-11-20 LAB
EXPIRATION DATE: ABNORMAL
FLUAV AG UPPER RESP QL IA.RAPID: NOT DETECTED
FLUBV AG UPPER RESP QL IA.RAPID: NOT DETECTED
INTERNAL CONTROL: ABNORMAL
Lab: ABNORMAL
SARS-COV-2 AG UPPER RESP QL IA.RAPID: DETECTED

## 2023-11-20 NOTE — PROGRESS NOTES
"    Brenden Pemberton DO  [facility]  [address] [place] [address] [address]  [place] [id number] [phone]    Subjective      Name Arun Painting MRN [id number]    1975 AGE/SEX 48 y.o. / male      Chief Complaint Chief Complaint   Patient presents with    Exposure To Known Illness     Wife tested positive for COVID. Started feeling bad today. Wife was diagnosed x 3 days          Visit History for  2023    History of Present Illness  Arun Painting is a 48 y.o. male who presented today for Exposure To Known Illness (Wife tested positive for COVID. Started feeling bad today. Wife was diagnosed x 3 days )    Wife tested positive for COVID-19 on Friday (2023). Started feeling ill this morning (2023). Currently working and needs a work note. Not having severe cough or dry scratchy throat. Not currently taking colchicine. Had mild shortness of breath all day. Felt worse through the day and felt weak while working.      Medications and Allergies   Current Outpatient Medications   Medication Instructions    allopurinol (ZYLOPRIM) 200 mg, Oral, Daily    amLODIPine (NORVASC) 5 mg, Oral, Daily    colchicine 0.6 mg, Oral, 2 Times Daily    losartan-hydrochlorothiazide (Hyzaar) 100-12.5 MG per tablet 1 tablet, Oral, Daily    meloxicam (MOBIC) 15 mg, Oral, Daily PRN    mesalamine (APRISO) 0.375 g 24 hr capsule TAKE ONE CAPSULE BY MOUTH FOUR TIMES A DAY    omeprazole (PRILOSEC) 40 mg, Oral, Daily     Allergies   Allergen Reactions    Asacol [Mesalamine] Rash      I have reviewed the above medications and allergies     Objective:      Vitals Vitals:    23 1428   BP: 138/88   BP Location: Right arm   Patient Position: Sitting   Cuff Size: Adult   Pulse: 95   Resp: 22   Temp: 100.2 °F (37.9 °C)   TempSrc: Temporal   SpO2: 98%   Weight: 112 kg (248 lb)   Height: 177.8 cm (70\")     Body mass index is 35.58 kg/m².    Physical Exam  Vitals reviewed.   Constitutional:       General: He " is not in acute distress.     Appearance: He is not ill-appearing.   Pulmonary:      Effort: Pulmonary effort is normal.   Psychiatric:         Mood and Affect: Mood normal.         Behavior: Behavior normal.         Thought Content: Thought content normal.         Judgment: Judgment normal.            Assessment/Plan      Issues Addressed/ Plan  1. Exposure to COVID-19 virus  - Positive testing in office  - POCT SARS-CoV-2 Antigen JOSE ALEJANDRO + Flu    2. COVID-19 virus infection  - Will be prescribed Nirmatrelvir & Ritonavir (Paxlovid).  - Advised not to take colchicine while taking the Nirmatrelvir & Ritonavir (Paxlovid).  - Advised to purchase decongestants over-the-counter the pharmacy and not off the shelf.  - Advised to purchase either Robitussin or Delsym for cough.  - Nirmatrelvir&Ritonavir 300/100 (PAXLOVID) 20 x 150 MG & 10 x 100MG tablet therapy pack tablet; Take 3 tablets by mouth 2 (Two) Times a Day for 5 days.  Dispense: 30 tablet; Refill: 0     There are no Patient Instructions on file for this visit.  Class 2 Severe Obesity (BMI >=35 and <=39.9). Obesity-related health conditions include the following: hypertension. Obesity is unchanged. BMI is is above average; BMI management plan is completed. We discussed portion control and increasing exercise.     COVID-19 exposure          Follow up  recommended Return if symptoms worsen or fail to improve.   - Dragon voice recognition software was utilized to complete this chart.  Every reasonable attempt was made to edit and correct the text, however some incorrect words may remain.   Brenden Pemberton DO       Transcribed from ambient dictation for Brenden Pemberton DO by Nydia Rebollar.  11/20/23   15:49 EST    Patient or patient representative verbalized consent to the visit recording.  I have personally performed the services described in this document as transcribed by the above individual, and it is both accurate and complete.

## 2023-11-20 NOTE — LETTER
November 20, 2023     Patient: Arun Painting   YOB: 1975   Date of Visit: 11/20/2023       To Whom It May Concern:    It is my medical opinion that Arun Painting may return to work on 11/26/2023.         Sincerely,        Brenden Pemberton DO    CC: No Recipients

## 2023-11-29 ENCOUNTER — OFFICE VISIT (OUTPATIENT)
Dept: FAMILY MEDICINE CLINIC | Facility: CLINIC | Age: 48
End: 2023-11-29
Payer: COMMERCIAL

## 2023-11-29 VITALS
TEMPERATURE: 98.2 F | RESPIRATION RATE: 20 BRPM | WEIGHT: 244 LBS | OXYGEN SATURATION: 97 % | SYSTOLIC BLOOD PRESSURE: 122 MMHG | HEART RATE: 69 BPM | HEIGHT: 70 IN | DIASTOLIC BLOOD PRESSURE: 100 MMHG | BODY MASS INDEX: 34.93 KG/M2

## 2023-11-29 DIAGNOSIS — E78.2 MIXED HYPERLIPIDEMIA: ICD-10-CM

## 2023-11-29 DIAGNOSIS — I10 PRIMARY HYPERTENSION: Primary | ICD-10-CM

## 2023-11-29 PROCEDURE — 99213 OFFICE O/P EST LOW 20 MIN: CPT | Performed by: INTERNAL MEDICINE

## 2023-11-29 RX ORDER — LOSARTAN POTASSIUM AND HYDROCHLOROTHIAZIDE 12.5; 1 MG/1; MG/1
1 TABLET ORAL DAILY
Qty: 90 TABLET | Refills: 2 | Status: SHIPPED | OUTPATIENT
Start: 2023-11-29

## 2023-11-29 NOTE — PROGRESS NOTES
Subjective   Arun Painting is a 48 y.o. male.     Chief Complaint   Patient presents with    Hypertension     Follow up       Hypertension  Pertinent negatives include no blurred vision, chest pain, palpitations or shortness of breath.      Patient here follow-up for hypertension.  Blood pressure still high.  Patient be checked to get home.  No nausea vomiting shortness of breath.  Taking current medication of Norvasc and Cozaar.  The following portions of the patient's history were reviewed and updated as appropriate: allergies, current medications, past family history, past medical history, past social history, past surgical history and problem list.    Review of Systems   Constitutional:  Negative for activity change, appetite change, fatigue and fever.   Eyes:  Negative for blurred vision and double vision.   Respiratory:  Negative for shortness of breath.    Cardiovascular:  Negative for chest pain, palpitations and leg swelling.   Neurological:  Negative for dizziness, syncope, light-headedness and headache.       Allergies   Allergen Reactions    Asacol [Mesalamine] Rash       Current Outpatient Medications on File Prior to Visit   Medication Sig Dispense Refill    allopurinol (Zyloprim) 100 MG tablet Take 2 tablets by mouth Daily. 180 tablet 3    amLODIPine (NORVASC) 5 MG tablet Take 1 tablet by mouth Daily. 90 tablet 5    meloxicam (MOBIC) 15 MG tablet Take 1 tablet by mouth Daily As Needed for Mild Pain.      mesalamine (APRISO) 0.375 g 24 hr capsule TAKE ONE CAPSULE BY MOUTH FOUR TIMES A  capsule 0    omeprazole (priLOSEC) 40 MG capsule Take 1 capsule by mouth Daily. 90 capsule 1    [DISCONTINUED] losartan (Cozaar) 100 MG tablet Take 1 tablet by mouth Daily. 90 tablet 3    colchicine 0.6 MG tablet Take 1 tablet by mouth 2 (Two) Times a Day. (Patient not taking: Reported on 11/29/2023) 20 tablet 0     No current facility-administered medications on file prior to visit.       Family History    Problem Relation Age of Onset    Colon polyps Mother     Diabetes Father     Colon cancer Neg Hx        Past Medical History:   Diagnosis Date    Arthritis     right shoulder    Esophagitis     Gastritis     GERD (gastroesophageal reflux disease)     Gout     Hiatal hernia     Hypertension     Ulcerative colitis        Past Surgical History:   Procedure Laterality Date    COLONOSCOPY  12/18/2013    stool, mild inflammation, mild-moderate chronic colitis w/crypt abscess     COLONOSCOPY N/A 10/11/2017    Procedure: COLONOSCOPY  w/ biopsies; polypectomy;  Surgeon: Arlyn Cam MD;  Location: [facility] OR;  Service:     COLONOSCOPY N/A 10/30/2019    Procedure: COLONOSCOPY with biopsies;  Surgeon: Arlyn Cam MD;  Location: [facility] OR;  Service: Gastroenterology    COLONOSCOPY N/A 3/15/2023    Procedure: COLONOSCOPY WITH BIOPSY;  Surgeon: Michael Sinclair MD;  Location: [facility] OR;  Service: Gastroenterology;  Laterality: N/A;  ulcerative colitis  biopsies:   right colon  transverse colon  sigmoid colon  rectal colon    PILONIDAL CYST / SINUS EXCISION N/A     recurring     Sinus excision done twice   now coming back    UPPER GASTROINTESTINAL ENDOSCOPY  08/07/2013    z line irregular, 38 cm from incisors, HH, gastritis, active esophagitis w/changes suggestive of an adjacent erosion, no h-pylorino Curran's       Social History     Socioeconomic History    Marital status:    Tobacco Use    Smoking status: Never     Passive exposure: Never    Smokeless tobacco: Never    Tobacco comments:     Tried a couple of cigs as a youngster   No habit    Vaping Use    Vaping Use: Never used   Substance and Sexual Activity    Alcohol use: Yes     Alcohol/week: 6.0 standard drinks of alcohol     Types: 6 Cans of beer per week     Comment: weekly    Drug use: No    Sexual activity: Defer       Patient Active Problem List   Diagnosis    Ulcerative colitis without complications    Hypertension    Atheroma, skin    Mixed  "hyperlipidemia    Family history of polyps in the colon    Idiopathic chronic gout of right foot without tophus    GERD (gastroesophageal reflux disease)    Hiatal hernia       /100 (BP Location: Left arm, Patient Position: Sitting, Cuff Size: Large Adult)   Pulse 69   Temp 98.2 °F (36.8 °C) (Temporal)   Resp 20   Ht 177.8 cm (70\")   Wt 111 kg (244 lb)   SpO2 97%   BMI 35.01 kg/m²   Body mass index is 35.01 kg/m².    Objective   Physical Exam  Vitals and nursing note reviewed.   Constitutional:       Appearance: He is well-developed.   Eyes:      Pupils: Pupils are equal, round, and reactive to light.   Neck:      Thyroid: No thyromegaly.      Vascular: No JVD.      Trachea: No tracheal deviation.   Cardiovascular:      Rate and Rhythm: Normal rate and regular rhythm.      Heart sounds: Normal heart sounds. No murmur heard.     No friction rub. No gallop.   Pulmonary:      Effort: Pulmonary effort is normal. No respiratory distress.      Breath sounds: Normal breath sounds. No wheezing.   Abdominal:      General: Bowel sounds are normal. There is no distension.      Palpations: Abdomen is soft. There is no mass.      Tenderness: There is no abdominal tenderness. There is no guarding or rebound.      Hernia: No hernia is present.   Musculoskeletal:         General: Normal range of motion.      Cervical back: Normal range of motion and neck supple.   Lymphadenopathy:      Cervical: No cervical adenopathy.   Neurological:      Mental Status: He is alert and oriented to person, place, and time.   Psychiatric:         Mood and Affect: Mood normal.         Behavior: Behavior normal.           Assessment & Plan   Diagnoses and all orders for this visit:    1. Primary hypertension (Primary)    2. Mixed hyperlipidemia    Other orders  -     losartan-hydrochlorothiazide (Hyzaar) 100-12.5 MG per tablet; Take 1 tablet by mouth Daily.  Dispense: 90 tablet; Refill: 2    Continue diet and exercise.  Check blood " pressure at home.  Discontinue losartan, Hyzaar was not prescribed.  Continue Norvasc.  Check blood pressure at home.  Return in 6 weeks time.  Continue rest of the medications.  Discussed discontinuing or cutting down on Mobic.  EHR dragon/transcription disclaimer:  Part of this note are created by electronic transcription/translation of spoken language to printed text and thus may lead to erroneous, or at times, nonsensical words or phrases inadvertently transcribed.  Although I have reviewed for such errors, some may still exist.

## 2024-01-10 ENCOUNTER — OFFICE VISIT (OUTPATIENT)
Dept: FAMILY MEDICINE CLINIC | Facility: CLINIC | Age: 49
End: 2024-01-10
Payer: COMMERCIAL

## 2024-01-10 VITALS
DIASTOLIC BLOOD PRESSURE: 86 MMHG | TEMPERATURE: 98.1 F | WEIGHT: 249 LBS | HEART RATE: 71 BPM | OXYGEN SATURATION: 97 % | HEIGHT: 70 IN | RESPIRATION RATE: 20 BRPM | SYSTOLIC BLOOD PRESSURE: 118 MMHG | BODY MASS INDEX: 35.65 KG/M2

## 2024-01-10 DIAGNOSIS — E78.2 MIXED HYPERLIPIDEMIA: ICD-10-CM

## 2024-01-10 DIAGNOSIS — I10 PRIMARY HYPERTENSION: Primary | ICD-10-CM

## 2024-01-10 PROCEDURE — 99213 OFFICE O/P EST LOW 20 MIN: CPT | Performed by: INTERNAL MEDICINE

## 2024-01-10 NOTE — PROGRESS NOTES
Venipuncture Blood Specimen Collection  Venipuncture performed in left arm by Carmela Olmedo MA with good hemostasis. Patient tolerated the procedure well without complications.   01/10/24   Carmela Olmedo MA

## 2024-01-10 NOTE — PROGRESS NOTES
Subjective   Arun Painting is a 48 y.o. male.     Chief Complaint   Patient presents with    Hypertension     Follow up    Hyperlipidemia    Hypertension  Pertinent negatives include no blurred vision, chest pain, palpitations or shortness of breath.      Patient seen follow-up for hypertension, hyperlipidemia.  Has no complaints.  Hyzaar was added to Norvasc last time.  The following portions of the patient's history were reviewed and updated as appropriate: allergies, current medications, past family history, past medical history, past social history, past surgical history and problem list.    Review of Systems   Constitutional:  Negative for activity change, appetite change, fatigue and fever.   Eyes:  Negative for blurred vision and double vision.   Respiratory: Negative.  Negative for shortness of breath.    Cardiovascular:  Negative for chest pain, palpitations and leg swelling.   Gastrointestinal: Negative.    Neurological:  Negative for dizziness, syncope, light-headedness and headache.       Allergies   Allergen Reactions    Asacol [Mesalamine] Rash       Current Outpatient Medications on File Prior to Visit   Medication Sig Dispense Refill    allopurinol (Zyloprim) 100 MG tablet Take 2 tablets by mouth Daily. 180 tablet 3    amLODIPine (NORVASC) 5 MG tablet Take 1 tablet by mouth Daily. 90 tablet 5    losartan-hydrochlorothiazide (Hyzaar) 100-12.5 MG per tablet Take 1 tablet by mouth Daily. 90 tablet 2    meloxicam (MOBIC) 15 MG tablet Take 1 tablet by mouth Daily As Needed for Mild Pain.      mesalamine (APRISO) 0.375 g 24 hr capsule TAKE ONE CAPSULE BY MOUTH FOUR TIMES A  capsule 0    omeprazole (priLOSEC) 40 MG capsule Take 1 capsule by mouth Daily. 90 capsule 1    colchicine 0.6 MG tablet Take 1 tablet by mouth 2 (Two) Times a Day. (Patient not taking: Reported on 11/29/2023) 20 tablet 0     No current facility-administered medications on file prior to visit.       Family History   Problem  Relation Age of Onset    Colon polyps Mother     Diabetes Father     Colon cancer Neg Hx        Past Medical History:   Diagnosis Date    Arthritis     right shoulder    Esophagitis     Gastritis     GERD (gastroesophageal reflux disease)     Gout     Hiatal hernia     Hypertension     Ulcerative colitis        Past Surgical History:   Procedure Laterality Date    COLONOSCOPY  12/18/2013    stool, mild inflammation, mild-moderate chronic colitis w/crypt abscess     COLONOSCOPY N/A 10/11/2017    Procedure: COLONOSCOPY  w/ biopsies; polypectomy;  Surgeon: Arlyn Cam MD;  Location: [facility] OR;  Service:     COLONOSCOPY N/A 10/30/2019    Procedure: COLONOSCOPY with biopsies;  Surgeon: Arlyn Cam MD;  Location: [facility] OR;  Service: Gastroenterology    COLONOSCOPY N/A 3/15/2023    Procedure: COLONOSCOPY WITH BIOPSY;  Surgeon: Michael Sinclair MD;  Location: [facility] OR;  Service: Gastroenterology;  Laterality: N/A;  ulcerative colitis  biopsies:   right colon  transverse colon  sigmoid colon  rectal colon    PILONIDAL CYST / SINUS EXCISION N/A     recurring     Sinus excision done twice   now coming back    UPPER GASTROINTESTINAL ENDOSCOPY  08/07/2013    z line irregular, 38 cm from incisors, HH, gastritis, active esophagitis w/changes suggestive of an adjacent erosion, no h-pylorino Curran's       Social History     Socioeconomic History    Marital status:    Tobacco Use    Smoking status: Never     Passive exposure: Never    Smokeless tobacco: Never    Tobacco comments:     Tried a couple of cigs as a youngster   No habit    Vaping Use    Vaping Use: Never used   Substance and Sexual Activity    Alcohol use: Yes     Alcohol/week: 6.0 standard drinks of alcohol     Types: 6 Cans of beer per week     Comment: weekly    Drug use: No    Sexual activity: Defer       Patient Active Problem List   Diagnosis    Ulcerative colitis without complications    Hypertension    Atheroma, skin    Mixed  "hyperlipidemia    Family history of polyps in the colon    Idiopathic chronic gout of right foot without tophus    GERD (gastroesophageal reflux disease)    Hiatal hernia       /86 (BP Location: Right arm, Patient Position: Sitting, Cuff Size: Large Adult)   Pulse 71   Temp 98.1 °F (36.7 °C) (Temporal)   Resp 20   Ht 177.8 cm (70\")   Wt 113 kg (249 lb)   SpO2 97%   BMI 35.73 kg/m²   Body mass index is 35.73 kg/m².    Objective   Physical Exam  Constitutional:       Appearance: He is well-developed.   Eyes:      Pupils: Pupils are equal, round, and reactive to light.   Neck:      Thyroid: No thyromegaly.      Vascular: No JVD.      Trachea: No tracheal deviation.   Cardiovascular:      Rate and Rhythm: Normal rate and regular rhythm.      Heart sounds: Normal heart sounds. No murmur heard.     No friction rub. No gallop.   Pulmonary:      Effort: Pulmonary effort is normal. No respiratory distress.      Breath sounds: Normal breath sounds. No wheezing.   Abdominal:      General: Bowel sounds are normal. There is no distension.      Palpations: Abdomen is soft. There is no mass.      Tenderness: There is no abdominal tenderness. There is no guarding or rebound.      Hernia: No hernia is present.   Musculoskeletal:         General: Normal range of motion.      Cervical back: Normal range of motion and neck supple.   Lymphadenopathy:      Cervical: No cervical adenopathy.   Neurological:      General: No focal deficit present.      Mental Status: He is alert and oriented to person, place, and time.   Psychiatric:         Behavior: Behavior normal.           Assessment & Plan   Diagnoses and all orders for this visit:    1. Primary hypertension (Primary)  -     Basic Metabolic Panel    2. Mixed hyperlipidemia    Continue diet and exercise.  Continue checking blood pressure.  Continue Norvasc, Hyzaar.  Continue rest of the medications.  Return in 3 months time fasting.  Blood pressure is much better now.  EHR " dragon/transcription disclaimer:  Part of this note are created by electronic transcription/translation of spoken language to printed text and thus may lead to erroneous, or at times, nonsensical words or phrases inadvertently transcribed.  Although I have reviewed for such errors, some may still exist.

## 2024-01-11 LAB
BUN SERPL-MCNC: 12 MG/DL (ref 6–20)
BUN/CREAT SERPL: 12.1 (ref 7–25)
CALCIUM SERPL-MCNC: 9.8 MG/DL (ref 8.6–10.5)
CHLORIDE SERPL-SCNC: 103 MMOL/L (ref 98–107)
CO2 SERPL-SCNC: 24.2 MMOL/L (ref 22–29)
CREAT SERPL-MCNC: 0.99 MG/DL (ref 0.76–1.27)
EGFRCR SERPLBLD CKD-EPI 2021: 93.4 ML/MIN/1.73
GLUCOSE SERPL-MCNC: 81 MG/DL (ref 65–99)
POTASSIUM SERPL-SCNC: 3.9 MMOL/L (ref 3.5–5.2)
SODIUM SERPL-SCNC: 139 MMOL/L (ref 136–145)

## 2024-01-29 NOTE — ANESTHESIA PREPROCEDURE EVALUATION
Anesthesia Evaluation     Patient summary reviewed and Nursing notes reviewed   no history of anesthetic complications:  NPO Solid Status: > 8 hours  NPO Liquid Status: > 8 hours           Airway   Mallampati: II  TM distance: >3 FB  Neck ROM: full  No difficulty expected  Dental          Pulmonary - negative pulmonary ROS and normal exam    breath sounds clear to auscultation  Cardiovascular - normal exam  Exercise tolerance: good (4-7 METS)    Rhythm: regular  Rate: normal    (+) hypertension well controlled 2 medications or greater, hyperlipidemia,   Angina: history of pain, negative work up per patient.      Neuro/Psych- negative ROS  GI/Hepatic/Renal/Endo    (+)  hiatal hernia, GERD well controlled,      ROS Comment: Gout  Ulcerative colitis    Musculoskeletal     Abdominal  - normal exam   Substance History   Alcohol use: few beers daily.     OB/GYN negative ob/gyn ROS         Other   arthritis (ankle-left),                      Anesthesia Plan    ASA 2     MAC     intravenous induction     Anesthetic plan, risks, benefits, and alternatives have been provided, discussed and informed consent has been obtained with: patient and spouse/significant other.        CODE STATUS:       
-3

## 2024-04-17 ENCOUNTER — OFFICE VISIT (OUTPATIENT)
Dept: FAMILY MEDICINE CLINIC | Facility: CLINIC | Age: 49
End: 2024-04-17
Payer: COMMERCIAL

## 2024-04-17 VITALS
WEIGHT: 246 LBS | SYSTOLIC BLOOD PRESSURE: 124 MMHG | DIASTOLIC BLOOD PRESSURE: 82 MMHG | HEART RATE: 86 BPM | HEIGHT: 70 IN | TEMPERATURE: 98.3 F | BODY MASS INDEX: 35.22 KG/M2 | OXYGEN SATURATION: 91 % | RESPIRATION RATE: 20 BRPM

## 2024-04-17 DIAGNOSIS — K51.90 ULCERATIVE COLITIS WITHOUT COMPLICATIONS, UNSPECIFIED LOCATION: ICD-10-CM

## 2024-04-17 DIAGNOSIS — E78.2 MIXED HYPERLIPIDEMIA: ICD-10-CM

## 2024-04-17 DIAGNOSIS — M1A.0710 IDIOPATHIC CHRONIC GOUT OF RIGHT FOOT WITHOUT TOPHUS: ICD-10-CM

## 2024-04-17 DIAGNOSIS — I10 PRIMARY HYPERTENSION: Primary | ICD-10-CM

## 2024-04-17 DIAGNOSIS — K21.9 GASTROESOPHAGEAL REFLUX DISEASE, UNSPECIFIED WHETHER ESOPHAGITIS PRESENT: ICD-10-CM

## 2024-04-17 PROCEDURE — 99214 OFFICE O/P EST MOD 30 MIN: CPT | Performed by: INTERNAL MEDICINE

## 2024-04-17 RX ORDER — BACLOFEN 10 MG/1
10 TABLET ORAL NIGHTLY PRN
Qty: 15 TABLET | Refills: 0 | Status: SHIPPED | OUTPATIENT
Start: 2024-04-17

## 2024-04-17 NOTE — PROGRESS NOTES
Subjective   Arun Painting is a 48 y.o. male.     Chief Complaint   Patient presents with    Hypertension    Gout   Hyperlipidemia, GERD, gout    Hypertension  Pertinent negatives include no blurred vision, chest pain, palpitations or shortness of breath.   Gout  Pertinent negatives include no chest pain, fatigue or fever.      Patient seen follow-up for hypertension, gout, hyperlipidemia, GERD.  No chest pain shortness of breath.  No nausea vomiting.  Blood pressure okay at home.  Taking all current medications.  GERD is under control.  No flareups.  The following portions of the patient's history were reviewed and updated as appropriate: allergies, current medications, past family history, past medical history, past social history, past surgical history and problem list.    Review of Systems   Constitutional:  Negative for activity change, appetite change, fatigue and fever.   Eyes:  Negative for blurred vision and double vision.   Respiratory: Negative.  Negative for shortness of breath.    Cardiovascular:  Negative for chest pain, palpitations and leg swelling.   Gastrointestinal: Negative.    Musculoskeletal:  Positive for gout.   Neurological:  Negative for dizziness, syncope, light-headedness and headache.       Allergies   Allergen Reactions    Asacol [Mesalamine] Rash       Current Outpatient Medications on File Prior to Visit   Medication Sig Dispense Refill    allopurinol (Zyloprim) 100 MG tablet Take 2 tablets by mouth Daily. 180 tablet 3    amLODIPine (NORVASC) 5 MG tablet Take 1 tablet by mouth Daily. 90 tablet 5    colchicine 0.6 MG tablet Take 1 tablet by mouth 2 (Two) Times a Day. 20 tablet 0    losartan-hydrochlorothiazide (Hyzaar) 100-12.5 MG per tablet Take 1 tablet by mouth Daily. 90 tablet 2    meloxicam (MOBIC) 15 MG tablet Take 1 tablet by mouth Daily As Needed for Mild Pain.      mesalamine (APRISO) 0.375 g 24 hr capsule TAKE ONE CAPSULE BY MOUTH FOUR TIMES A  capsule 0     omeprazole (priLOSEC) 40 MG capsule Take 1 capsule by mouth Daily. 90 capsule 1     No current facility-administered medications on file prior to visit.       Family History   Problem Relation Age of Onset    Colon polyps Mother     Diabetes Father     Colon cancer Neg Hx        Past Medical History:   Diagnosis Date    Arthritis     right shoulder    Esophagitis     Gastritis     GERD (gastroesophageal reflux disease)     Gout     Hiatal hernia     Hypertension     Ulcerative colitis        Past Surgical History:   Procedure Laterality Date    COLONOSCOPY  12/18/2013    stool, mild inflammation, mild-moderate chronic colitis w/crypt abscess     COLONOSCOPY N/A 10/11/2017    Procedure: COLONOSCOPY  w/ biopsies; polypectomy;  Surgeon: Arlyn Cam MD;  Location: [facility] OR;  Service:     COLONOSCOPY N/A 10/30/2019    Procedure: COLONOSCOPY with biopsies;  Surgeon: Arlyn Cam MD;  Location: [facility] OR;  Service: Gastroenterology    COLONOSCOPY N/A 3/15/2023    Procedure: COLONOSCOPY WITH BIOPSY;  Surgeon: Michael Sinclair MD;  Location: [facility] OR;  Service: Gastroenterology;  Laterality: N/A;  ulcerative colitis  biopsies:   right colon  transverse colon  sigmoid colon  rectal colon    PILONIDAL CYST / SINUS EXCISION N/A     recurring     Sinus excision done twice   now coming back    UPPER GASTROINTESTINAL ENDOSCOPY  08/07/2013    z line irregular, 38 cm from incisors, HH, gastritis, active esophagitis w/changes suggestive of an adjacent erosion, no h-pylorino Curran's       Social History     Socioeconomic History    Marital status:    Tobacco Use    Smoking status: Never     Passive exposure: Never    Smokeless tobacco: Never    Tobacco comments:     Tried a couple of cigs as a youngster   No habit    Vaping Use    Vaping status: Never Used   Substance and Sexual Activity    Alcohol use: Yes     Alcohol/week: 6.0 standard drinks of alcohol     Types: 6 Cans of beer per week     Comment:  "weekly    Drug use: No    Sexual activity: Defer       Patient Active Problem List   Diagnosis    Ulcerative colitis without complications    Hypertension    Atheroma, skin    Mixed hyperlipidemia    Family history of polyps in the colon    Idiopathic chronic gout of right foot without tophus    GERD (gastroesophageal reflux disease)    Hiatal hernia       /82   Pulse 86   Temp 98.3 °F (36.8 °C)   Resp 20   Ht 177.8 cm (70\")   Wt 112 kg (246 lb)   SpO2 91%   BMI 35.30 kg/m²   Body mass index is 35.3 kg/m².    Objective   Physical Exam  Vitals and nursing note reviewed.   Constitutional:       Appearance: He is well-developed.   Eyes:      Pupils: Pupils are equal, round, and reactive to light.   Neck:      Thyroid: No thyromegaly.      Vascular: No JVD.      Trachea: No tracheal deviation.   Cardiovascular:      Rate and Rhythm: Normal rate and regular rhythm.      Heart sounds: Normal heart sounds. No murmur heard.     No friction rub. No gallop.   Pulmonary:      Effort: Pulmonary effort is normal. No respiratory distress.      Breath sounds: Normal breath sounds. No wheezing.   Abdominal:      General: Bowel sounds are normal. There is no distension.      Palpations: Abdomen is soft. There is no mass.      Tenderness: There is no abdominal tenderness. There is no guarding or rebound.      Hernia: No hernia is present.   Musculoskeletal:         General: Normal range of motion.      Cervical back: Normal range of motion and neck supple.   Lymphadenopathy:      Cervical: No cervical adenopathy.   Neurological:      General: No focal deficit present.      Mental Status: He is alert and oriented to person, place, and time.   Psychiatric:         Mood and Affect: Mood normal.         Behavior: Behavior normal.           Assessment & Plan   Diagnoses and all orders for this visit:    1. Primary hypertension (Primary)  -     CBC & Differential  -     CK  -     Comprehensive Metabolic Panel  -     Lipid " Panel With / Chol / HDL Ratio  -     Uric Acid    2. Mixed hyperlipidemia  -     CBC & Differential  -     CK  -     Comprehensive Metabolic Panel  -     Lipid Panel With / Chol / HDL Ratio  -     Uric Acid    3. Gastroesophageal reflux disease, unspecified whether esophagitis present  -     CBC & Differential  -     CK  -     Comprehensive Metabolic Panel  -     Lipid Panel With / Chol / HDL Ratio  -     Uric Acid    4. Idiopathic chronic gout of right foot without tophus  -     CBC & Differential  -     CK  -     Comprehensive Metabolic Panel  -     Lipid Panel With / Chol / HDL Ratio  -     Uric Acid    5. Ulcerative colitis without complications, unspecified location  -     CBC & Differential  -     CK  -     Comprehensive Metabolic Panel  -     Lipid Panel With / Chol / HDL Ratio  -     Uric Acid    Other orders  -     baclofen (LIORESAL) 10 MG tablet; Take 1 tablet by mouth At Night As Needed for Muscle Spasms.  Dispense: 15 tablet; Refill: 0    Continue diet and exercise.  Check blood pressure at home.  Continue Zyloprim, Norvasc, colchicine, losartan/hydrochlorothiazide, misalignment, Prilosec.  Prilosec helping with GERD symptoms.  Patient is getting moving by orthopedics.  Discussed with him side effects.  Patient wants.  Baclofen for his back.  He takes off-and-on.  All his problems are chronic and stable.  Awaiting labs.  EHR dragon/transcription disclaimer:  Part of this note are created by electronic transcription/translation of spoken language to printed text and thus may lead to erroneous, or at times, nonsensical words or phrases inadvertently transcribed.  Although I have reviewed for such errors, some may still exist.

## 2024-04-17 NOTE — PROGRESS NOTES
Venipuncture Blood Specimen Collection  Venipuncture performed in right arm by Carmela Olmedo MA with good hemostasis. Patient tolerated the procedure well without complications.   04/17/24   Carmela Olmedo MA

## 2024-04-18 LAB
ALBUMIN SERPL-MCNC: 4 G/DL (ref 3.5–5.2)
ALBUMIN/GLOB SERPL: 1.3 G/DL
ALP SERPL-CCNC: 98 U/L (ref 39–117)
ALT SERPL-CCNC: 21 U/L (ref 1–41)
AST SERPL-CCNC: 16 U/L (ref 1–40)
BASOPHILS # BLD AUTO: 0.04 10*3/MM3 (ref 0–0.2)
BASOPHILS NFR BLD AUTO: 0.6 % (ref 0–1.5)
BILIRUB SERPL-MCNC: 0.4 MG/DL (ref 0–1.2)
BUN SERPL-MCNC: 10 MG/DL (ref 6–20)
BUN/CREAT SERPL: 10.8 (ref 7–25)
CALCIUM SERPL-MCNC: 9.5 MG/DL (ref 8.6–10.5)
CHLORIDE SERPL-SCNC: 104 MMOL/L (ref 98–107)
CHOLEST SERPL-MCNC: 194 MG/DL (ref 0–200)
CHOLEST/HDLC SERPL: 5.54 {RATIO}
CK SERPL-CCNC: 74 U/L (ref 20–200)
CO2 SERPL-SCNC: 22.8 MMOL/L (ref 22–29)
CREAT SERPL-MCNC: 0.93 MG/DL (ref 0.76–1.27)
EGFRCR SERPLBLD CKD-EPI 2021: 100.7 ML/MIN/1.73
EOSINOPHIL # BLD AUTO: 0.17 10*3/MM3 (ref 0–0.4)
EOSINOPHIL NFR BLD AUTO: 2.4 % (ref 0.3–6.2)
ERYTHROCYTE [DISTWIDTH] IN BLOOD BY AUTOMATED COUNT: 12.4 % (ref 12.3–15.4)
GLOBULIN SER CALC-MCNC: 3 GM/DL
GLUCOSE SERPL-MCNC: 109 MG/DL (ref 65–99)
HCT VFR BLD AUTO: 40 % (ref 37.5–51)
HDLC SERPL-MCNC: 35 MG/DL (ref 40–60)
HGB BLD-MCNC: 13.4 G/DL (ref 13–17.7)
IMM GRANULOCYTES # BLD AUTO: 0.02 10*3/MM3 (ref 0–0.05)
IMM GRANULOCYTES NFR BLD AUTO: 0.3 % (ref 0–0.5)
LDLC SERPL CALC-MCNC: 132 MG/DL (ref 0–100)
LYMPHOCYTES # BLD AUTO: 2.45 10*3/MM3 (ref 0.7–3.1)
LYMPHOCYTES NFR BLD AUTO: 35.1 % (ref 19.6–45.3)
MCH RBC QN AUTO: 29.5 PG (ref 26.6–33)
MCHC RBC AUTO-ENTMCNC: 33.5 G/DL (ref 31.5–35.7)
MCV RBC AUTO: 87.9 FL (ref 79–97)
MONOCYTES # BLD AUTO: 0.53 10*3/MM3 (ref 0.1–0.9)
MONOCYTES NFR BLD AUTO: 7.6 % (ref 5–12)
NEUTROPHILS # BLD AUTO: 3.78 10*3/MM3 (ref 1.7–7)
NEUTROPHILS NFR BLD AUTO: 54 % (ref 42.7–76)
NRBC BLD AUTO-RTO: 0 /100 WBC (ref 0–0.2)
PLATELET # BLD AUTO: 265 10*3/MM3 (ref 140–450)
POTASSIUM SERPL-SCNC: 3.9 MMOL/L (ref 3.5–5.2)
PROT SERPL-MCNC: 7 G/DL (ref 6–8.5)
RBC # BLD AUTO: 4.55 10*6/MM3 (ref 4.14–5.8)
SODIUM SERPL-SCNC: 141 MMOL/L (ref 136–145)
TRIGL SERPL-MCNC: 152 MG/DL (ref 0–150)
URATE SERPL-MCNC: 6 MG/DL (ref 3.4–7)
VLDLC SERPL CALC-MCNC: 27 MG/DL (ref 5–40)
WBC # BLD AUTO: 6.99 10*3/MM3 (ref 3.4–10.8)

## 2024-05-23 RX ORDER — MESALAMINE 0.38 G/1
0.38 CAPSULE, EXTENDED RELEASE ORAL 4 TIMES DAILY
Qty: 240 CAPSULE | Refills: 0 | Status: SHIPPED | OUTPATIENT
Start: 2024-05-23

## 2024-05-23 NOTE — TELEPHONE ENCOUNTER
PT MADE AN APPT TO SEE NEELIMA JULY 18TH    HE NEEDS A REFILL UNTIL THEN    EXPLAINED HE HAD TO KEEP APPT TO GET HIS YR REFILL    UNDERSTOOD

## 2024-06-26 ENCOUNTER — OFFICE VISIT (OUTPATIENT)
Dept: FAMILY MEDICINE CLINIC | Facility: CLINIC | Age: 49
End: 2024-06-26
Payer: COMMERCIAL

## 2024-06-26 VITALS
HEART RATE: 97 BPM | OXYGEN SATURATION: 94 % | DIASTOLIC BLOOD PRESSURE: 82 MMHG | HEIGHT: 70 IN | SYSTOLIC BLOOD PRESSURE: 120 MMHG | WEIGHT: 243 LBS | RESPIRATION RATE: 20 BRPM | TEMPERATURE: 98.5 F | BODY MASS INDEX: 34.79 KG/M2

## 2024-06-26 DIAGNOSIS — G47.33 OBSTRUCTIVE SLEEP APNEA, ADULT: ICD-10-CM

## 2024-06-26 DIAGNOSIS — R07.0 THROAT PAIN: Primary | ICD-10-CM

## 2024-06-26 DIAGNOSIS — K21.9 GASTROESOPHAGEAL REFLUX DISEASE, UNSPECIFIED WHETHER ESOPHAGITIS PRESENT: ICD-10-CM

## 2024-06-26 DIAGNOSIS — R06.83 SNORING: ICD-10-CM

## 2024-06-26 LAB
EXPIRATION DATE: NORMAL
EXPIRATION DATE: NORMAL
INTERNAL CONTROL: NORMAL
INTERNAL CONTROL: NORMAL
Lab: NORMAL
Lab: NORMAL
S PYO AG THROAT QL: NORMAL
SARS-COV-2 AG UPPER RESP QL IA.RAPID: NOT DETECTED

## 2024-06-26 PROCEDURE — 87880 STREP A ASSAY W/OPTIC: CPT | Performed by: INTERNAL MEDICINE

## 2024-06-26 PROCEDURE — 99213 OFFICE O/P EST LOW 20 MIN: CPT | Performed by: INTERNAL MEDICINE

## 2024-06-26 PROCEDURE — 87426 SARSCOV CORONAVIRUS AG IA: CPT | Performed by: INTERNAL MEDICINE

## 2024-06-26 NOTE — PROGRESS NOTES
Subjective   Arun Painting is a 48 y.o. male.     Chief Complaint   Patient presents with    swollen throat       History of Present Illness   Patient complains of sore throat for more than a month.  Patient had similar episode few years ago has seen ENT at the time.  He is on Prilosec 40 once a day.  Reports he does not feel reflect symptoms.  May be just allergies.  Start taking Allegra.  Patient also complains of snoring wants to get sleep apnea evaluation.  The following portions of the patient's history were reviewed and updated as appropriate: allergies, current medications, past family history, past medical history, past social history, past surgical history and problem list.    Review of Systems   Constitutional:  Negative for activity change, appetite change, fatigue and fever.   HENT:  Positive for sore throat.    Eyes:  Negative for blurred vision and double vision.   Respiratory:  Negative for shortness of breath.    Cardiovascular:  Negative for chest pain, palpitations and leg swelling.   Neurological:  Negative for dizziness, syncope, light-headedness and headache.       Allergies   Allergen Reactions    Asacol [Mesalamine] Rash       Current Outpatient Medications on File Prior to Visit   Medication Sig Dispense Refill    allopurinol (Zyloprim) 100 MG tablet Take 2 tablets by mouth Daily. 180 tablet 3    amLODIPine (NORVASC) 5 MG tablet Take 1 tablet by mouth Daily. 90 tablet 5    baclofen (LIORESAL) 10 MG tablet Take 1 tablet by mouth At Night As Needed for Muscle Spasms. 15 tablet 0    colchicine 0.6 MG tablet Take 1 tablet by mouth 2 (Two) Times a Day. 20 tablet 0    losartan-hydrochlorothiazide (Hyzaar) 100-12.5 MG per tablet Take 1 tablet by mouth Daily. 90 tablet 2    meloxicam (MOBIC) 15 MG tablet Take 1 tablet by mouth Daily As Needed for Mild Pain.      mesalamine (APRISO) 0.375 g 24 hr capsule Take 1 capsule by mouth 4 (Four) Times a Day. 240 capsule 0    omeprazole (priLOSEC) 40 MG  capsule Take 1 capsule by mouth Daily. 90 capsule 1     No current facility-administered medications on file prior to visit.       Family History   Problem Relation Age of Onset    Colon polyps Mother     Diabetes Father     Colon cancer Neg Hx        Past Medical History:   Diagnosis Date    Arthritis     right shoulder    Esophagitis     Gastritis     GERD (gastroesophageal reflux disease)     Gout     Hiatal hernia     Hypertension     Ulcerative colitis        Past Surgical History:   Procedure Laterality Date    COLONOSCOPY  12/18/2013    stool, mild inflammation, mild-moderate chronic colitis w/crypt abscess     COLONOSCOPY N/A 10/11/2017    Procedure: COLONOSCOPY  w/ biopsies; polypectomy;  Surgeon: Arlyn Cam MD;  Location: [facility] OR;  Service:     COLONOSCOPY N/A 10/30/2019    Procedure: COLONOSCOPY with biopsies;  Surgeon: Arlyn Cam MD;  Location: [facility] OR;  Service: Gastroenterology    COLONOSCOPY N/A 3/15/2023    Procedure: COLONOSCOPY WITH BIOPSY;  Surgeon: Michael Sinclair MD;  Location: [facility] OR;  Service: Gastroenterology;  Laterality: N/A;  ulcerative colitis  biopsies:   right colon  transverse colon  sigmoid colon  rectal colon    PILONIDAL CYST / SINUS EXCISION N/A     recurring     Sinus excision done twice   now coming back    UPPER GASTROINTESTINAL ENDOSCOPY  08/07/2013    z line irregular, 38 cm from incisors, HH, gastritis, active esophagitis w/changes suggestive of an adjacent erosion, no h-pylorino Curran's       Social History     Socioeconomic History    Marital status:    Tobacco Use    Smoking status: Never     Passive exposure: Never    Smokeless tobacco: Never    Tobacco comments:     Tried a couple of cigs as a youngster   No habit    Vaping Use    Vaping status: Never Used   Substance and Sexual Activity    Alcohol use: Yes     Alcohol/week: 6.0 standard drinks of alcohol     Types: 6 Cans of beer per week     Comment: weekly    Drug use: No    Sexual  "activity: Defer       Patient Active Problem List   Diagnosis    Ulcerative colitis without complications    Hypertension    Atheroma, skin    Mixed hyperlipidemia    Family history of polyps in the colon    Idiopathic chronic gout of right foot without tophus    GERD (gastroesophageal reflux disease)    Hiatal hernia       /82   Pulse 97   Temp 98.5 °F (36.9 °C)   Resp 20   Ht 177.8 cm (70\")   Wt 110 kg (243 lb)   SpO2 94%   BMI 34.87 kg/m²   Body mass index is 34.87 kg/m².    Objective   Physical Exam  Vitals and nursing note reviewed.   Constitutional:       Appearance: He is well-developed.   HENT:      Right Ear: Tympanic membrane and ear canal normal.      Left Ear: Tympanic membrane and ear canal normal. There is impacted cerumen.      Mouth/Throat:      Mouth: Mucous membranes are moist.      Pharynx: Oropharynx is clear. No oropharyngeal exudate or posterior oropharyngeal erythema.   Eyes:      Extraocular Movements: Extraocular movements intact.      Pupils: Pupils are equal, round, and reactive to light.   Neck:      Thyroid: No thyromegaly.      Vascular: No JVD.      Trachea: No tracheal deviation.   Cardiovascular:      Rate and Rhythm: Normal rate and regular rhythm.      Heart sounds: Normal heart sounds. No murmur heard.     No friction rub. No gallop.   Pulmonary:      Effort: Pulmonary effort is normal. No respiratory distress.      Breath sounds: Normal breath sounds. No wheezing.   Abdominal:      General: Bowel sounds are normal. There is no distension.      Palpations: Abdomen is soft. There is no mass.      Tenderness: There is no abdominal tenderness. There is no guarding or rebound.      Hernia: No hernia is present.   Musculoskeletal:         General: Normal range of motion.      Cervical back: Normal range of motion and neck supple.   Lymphadenopathy:      Cervical: No cervical adenopathy.   Neurological:      Mental Status: He is alert and oriented to person, place, and " time.   Psychiatric:         Behavior: Behavior normal.           Assessment & Plan   Diagnoses and all orders for this visit:    1. Throat pain (Primary)  -     POCT rapid strep A  -     POCT SARS-CoV-2 Antigen JOSE ALEJANDRO    2. Gastroesophageal reflux disease, unspecified whether esophagitis present    3. Snoring    Continue diet and exercise.  Lose weight.  Continue Allegra, add Flonase or Nasonex.  Add Pepcid AC to Prilosec.  Patient has appointment with ENT next month he will keep that.  Most likely it could be from reflux.  Return if no better.  I will see him back at his regular appointment.  Refer for sleep apnea evaluation.  Over-the-counter medication for left ear wax  EHR dragon/transcription disclaimer:  Part of this note are created by electronic transcription/translation of spoken language to printed text and thus may lead to erroneous, or at times, nonsensical words or phrases inadvertently transcribed.  Although I have reviewed for such errors, some may still exist.

## 2024-06-26 NOTE — PROGRESS NOTES
Subjective   Arun Painting is a 48 y.o. male.     Chief Complaint   Patient presents with    swollen throat       History of Present Illness   Patient complains of sore throat for more than a month.  Patient had similar episode few years ago has seen ENT at the time.  He is on Prilosec 40 once a day.  Reports he does not feel reflect symptoms.  May be just allergies.  Start taking Allegra.  Patient also complains of snoring wants to get sleep apnea evaluation.  The following portions of the patient's history were reviewed and updated as appropriate: allergies, current medications, past family history, past medical history, past social history, past surgical history and problem list.    Review of Systems   Constitutional:  Negative for activity change, appetite change, fatigue and fever.   HENT:  Positive for sore throat.    Eyes:  Negative for blurred vision and double vision.   Respiratory:  Negative for shortness of breath.    Cardiovascular:  Negative for chest pain, palpitations and leg swelling.   Neurological:  Negative for dizziness, syncope, light-headedness and headache.       Allergies   Allergen Reactions    Asacol [Mesalamine] Rash       Current Outpatient Medications on File Prior to Visit   Medication Sig Dispense Refill    allopurinol (Zyloprim) 100 MG tablet Take 2 tablets by mouth Daily. 180 tablet 3    amLODIPine (NORVASC) 5 MG tablet Take 1 tablet by mouth Daily. 90 tablet 5    baclofen (LIORESAL) 10 MG tablet Take 1 tablet by mouth At Night As Needed for Muscle Spasms. 15 tablet 0    colchicine 0.6 MG tablet Take 1 tablet by mouth 2 (Two) Times a Day. 20 tablet 0    losartan-hydrochlorothiazide (Hyzaar) 100-12.5 MG per tablet Take 1 tablet by mouth Daily. 90 tablet 2    meloxicam (MOBIC) 15 MG tablet Take 1 tablet by mouth Daily As Needed for Mild Pain.      mesalamine (APRISO) 0.375 g 24 hr capsule Take 1 capsule by mouth 4 (Four) Times a Day. 240 capsule 0    omeprazole (priLOSEC) 40 MG  capsule Take 1 capsule by mouth Daily. 90 capsule 1     No current facility-administered medications on file prior to visit.       Family History   Problem Relation Age of Onset    Colon polyps Mother     Diabetes Father     Colon cancer Neg Hx        Past Medical History:   Diagnosis Date    Arthritis     right shoulder    Esophagitis     Gastritis     GERD (gastroesophageal reflux disease)     Gout     Hiatal hernia     Hypertension     Ulcerative colitis        Past Surgical History:   Procedure Laterality Date    COLONOSCOPY  12/18/2013    stool, mild inflammation, mild-moderate chronic colitis w/crypt abscess     COLONOSCOPY N/A 10/11/2017    Procedure: COLONOSCOPY  w/ biopsies; polypectomy;  Surgeon: Arlyn Cam MD;  Location: [facility] OR;  Service:     COLONOSCOPY N/A 10/30/2019    Procedure: COLONOSCOPY with biopsies;  Surgeon: Arlyn Cam MD;  Location: [facility] OR;  Service: Gastroenterology    COLONOSCOPY N/A 3/15/2023    Procedure: COLONOSCOPY WITH BIOPSY;  Surgeon: Michael Sinclair MD;  Location: [facility] OR;  Service: Gastroenterology;  Laterality: N/A;  ulcerative colitis  biopsies:   right colon  transverse colon  sigmoid colon  rectal colon    PILONIDAL CYST / SINUS EXCISION N/A     recurring     Sinus excision done twice   now coming back    UPPER GASTROINTESTINAL ENDOSCOPY  08/07/2013    z line irregular, 38 cm from incisors, HH, gastritis, active esophagitis w/changes suggestive of an adjacent erosion, no h-pylorino Curran's       Social History     Socioeconomic History    Marital status:    Tobacco Use    Smoking status: Never     Passive exposure: Never    Smokeless tobacco: Never    Tobacco comments:     Tried a couple of cigs as a youngster   No habit    Vaping Use    Vaping status: Never Used   Substance and Sexual Activity    Alcohol use: Yes     Alcohol/week: 6.0 standard drinks of alcohol     Types: 6 Cans of beer per week     Comment: weekly    Drug use: No    Sexual  "activity: Defer       Patient Active Problem List   Diagnosis    Ulcerative colitis without complications    Hypertension    Atheroma, skin    Mixed hyperlipidemia    Family history of polyps in the colon    Idiopathic chronic gout of right foot without tophus    GERD (gastroesophageal reflux disease)    Hiatal hernia       /82   Pulse 97   Temp 98.5 °F (36.9 °C)   Resp 20   Ht 177.8 cm (70\")   Wt 110 kg (243 lb)   SpO2 94%   BMI 34.87 kg/m²   Body mass index is 34.87 kg/m².    Objective   Physical Exam  Vitals and nursing note reviewed.   Constitutional:       Appearance: He is well-developed.   HENT:      Right Ear: Tympanic membrane and ear canal normal.      Left Ear: Tympanic membrane and ear canal normal. There is impacted cerumen.      Mouth/Throat:      Mouth: Mucous membranes are moist.      Pharynx: Oropharynx is clear. No oropharyngeal exudate or posterior oropharyngeal erythema.   Eyes:      Extraocular Movements: Extraocular movements intact.      Pupils: Pupils are equal, round, and reactive to light.   Neck:      Thyroid: No thyromegaly.      Vascular: No JVD.      Trachea: No tracheal deviation.   Cardiovascular:      Rate and Rhythm: Normal rate and regular rhythm.      Heart sounds: Normal heart sounds. No murmur heard.     No friction rub. No gallop.   Pulmonary:      Effort: Pulmonary effort is normal. No respiratory distress.      Breath sounds: Normal breath sounds. No wheezing.   Abdominal:      General: Bowel sounds are normal. There is no distension.      Palpations: Abdomen is soft. There is no mass.      Tenderness: There is no abdominal tenderness. There is no guarding or rebound.      Hernia: No hernia is present.   Musculoskeletal:         General: Normal range of motion.      Cervical back: Normal range of motion and neck supple.   Lymphadenopathy:      Cervical: No cervical adenopathy.   Neurological:      Mental Status: He is alert and oriented to person, place, and " time.   Psychiatric:         Behavior: Behavior normal.           Assessment & Plan   Diagnoses and all orders for this visit:    1. Throat pain (Primary)  -     POCT rapid strep A  -     POCT SARS-CoV-2 Antigen JOSE ALEJANDRO    2. Gastroesophageal reflux disease, unspecified whether esophagitis present    3. Snoring    Continue diet and exercise.  Lose weight.  Continue Allegra, add Flonase or Nasonex.  Add Pepcid AC to Prilosec.  Patient has appointment with ENT next month he will keep that.  Most likely it could be from reflux.  Return if no better.  I will see him back at his regular appointment.  Refer for sleep apnea evaluation.  Over-the-counter medication for left ear wax  EHR dragon/transcription disclaimer:  Part of this note are created by electronic transcription/translation of spoken language to printed text and thus may lead to erroneous, or at times, nonsensical words or phrases inadvertently transcribed.  Although I have reviewed for such errors, some may still exist.

## 2024-06-26 NOTE — PROGRESS NOTES
Subjective   Arun Painting is a 48 y.o. male.     Chief Complaint   Patient presents with    swollen throat       History of Present Illness   Patient complains of sore throat for more than a month.  Patient had similar episode few years ago has seen ENT at the time.  He is on Prilosec 40 once a day.  Reports he does not feel reflect symptoms.  May be just allergies.  Start taking Allegra.  Patient also complains of snoring wants to get sleep apnea evaluation.  The following portions of the patient's history were reviewed and updated as appropriate: allergies, current medications, past family history, past medical history, past social history, past surgical history and problem list.    Review of Systems   Constitutional:  Negative for activity change, appetite change, fatigue and fever.   HENT:  Positive for sore throat.    Eyes:  Negative for blurred vision and double vision.   Respiratory:  Negative for shortness of breath.    Cardiovascular:  Negative for chest pain, palpitations and leg swelling.   Neurological:  Negative for dizziness, syncope, light-headedness and headache.       Allergies   Allergen Reactions    Asacol [Mesalamine] Rash       Current Outpatient Medications on File Prior to Visit   Medication Sig Dispense Refill    allopurinol (Zyloprim) 100 MG tablet Take 2 tablets by mouth Daily. 180 tablet 3    amLODIPine (NORVASC) 5 MG tablet Take 1 tablet by mouth Daily. 90 tablet 5    baclofen (LIORESAL) 10 MG tablet Take 1 tablet by mouth At Night As Needed for Muscle Spasms. 15 tablet 0    colchicine 0.6 MG tablet Take 1 tablet by mouth 2 (Two) Times a Day. 20 tablet 0    losartan-hydrochlorothiazide (Hyzaar) 100-12.5 MG per tablet Take 1 tablet by mouth Daily. 90 tablet 2    meloxicam (MOBIC) 15 MG tablet Take 1 tablet by mouth Daily As Needed for Mild Pain.      mesalamine (APRISO) 0.375 g 24 hr capsule Take 1 capsule by mouth 4 (Four) Times a Day. 240 capsule 0    omeprazole (priLOSEC) 40 MG  capsule Take 1 capsule by mouth Daily. 90 capsule 1     No current facility-administered medications on file prior to visit.       Family History   Problem Relation Age of Onset    Colon polyps Mother     Diabetes Father     Colon cancer Neg Hx        Past Medical History:   Diagnosis Date    Arthritis     right shoulder    Esophagitis     Gastritis     GERD (gastroesophageal reflux disease)     Gout     Hiatal hernia     Hypertension     Ulcerative colitis        Past Surgical History:   Procedure Laterality Date    COLONOSCOPY  12/18/2013    stool, mild inflammation, mild-moderate chronic colitis w/crypt abscess     COLONOSCOPY N/A 10/11/2017    Procedure: COLONOSCOPY  w/ biopsies; polypectomy;  Surgeon: Arlyn Cam MD;  Location: [facility] OR;  Service:     COLONOSCOPY N/A 10/30/2019    Procedure: COLONOSCOPY with biopsies;  Surgeon: Arlyn Cam MD;  Location: [facility] OR;  Service: Gastroenterology    COLONOSCOPY N/A 3/15/2023    Procedure: COLONOSCOPY WITH BIOPSY;  Surgeon: Michael Sinclair MD;  Location: [facility] OR;  Service: Gastroenterology;  Laterality: N/A;  ulcerative colitis  biopsies:   right colon  transverse colon  sigmoid colon  rectal colon    PILONIDAL CYST / SINUS EXCISION N/A     recurring     Sinus excision done twice   now coming back    UPPER GASTROINTESTINAL ENDOSCOPY  08/07/2013    z line irregular, 38 cm from incisors, HH, gastritis, active esophagitis w/changes suggestive of an adjacent erosion, no h-pylorino Curran's       Social History     Socioeconomic History    Marital status:    Tobacco Use    Smoking status: Never     Passive exposure: Never    Smokeless tobacco: Never    Tobacco comments:     Tried a couple of cigs as a youngster   No habit    Vaping Use    Vaping status: Never Used   Substance and Sexual Activity    Alcohol use: Yes     Alcohol/week: 6.0 standard drinks of alcohol     Types: 6 Cans of beer per week     Comment: weekly    Drug use: No    Sexual  "activity: Defer       Patient Active Problem List   Diagnosis    Ulcerative colitis without complications    Hypertension    Atheroma, skin    Mixed hyperlipidemia    Family history of polyps in the colon    Idiopathic chronic gout of right foot without tophus    GERD (gastroesophageal reflux disease)    Hiatal hernia       /82   Pulse 97   Temp 98.5 °F (36.9 °C)   Resp 20   Ht 177.8 cm (70\")   Wt 110 kg (243 lb)   SpO2 94%   BMI 34.87 kg/m²   Body mass index is 34.87 kg/m².    Objective   Physical Exam  Vitals and nursing note reviewed.   Constitutional:       Appearance: He is well-developed.   HENT:      Right Ear: Tympanic membrane and ear canal normal.      Left Ear: Tympanic membrane and ear canal normal. There is impacted cerumen.      Mouth/Throat:      Mouth: Mucous membranes are moist.      Pharynx: Oropharynx is clear. No oropharyngeal exudate or posterior oropharyngeal erythema.   Eyes:      Extraocular Movements: Extraocular movements intact.      Pupils: Pupils are equal, round, and reactive to light.   Neck:      Thyroid: No thyromegaly.      Vascular: No JVD.      Trachea: No tracheal deviation.   Cardiovascular:      Rate and Rhythm: Normal rate and regular rhythm.      Heart sounds: Normal heart sounds. No murmur heard.     No friction rub. No gallop.   Pulmonary:      Effort: Pulmonary effort is normal. No respiratory distress.      Breath sounds: Normal breath sounds. No wheezing.   Abdominal:      General: Bowel sounds are normal. There is no distension.      Palpations: Abdomen is soft. There is no mass.      Tenderness: There is no abdominal tenderness. There is no guarding or rebound.      Hernia: No hernia is present.   Musculoskeletal:         General: Normal range of motion.      Cervical back: Normal range of motion and neck supple.   Lymphadenopathy:      Cervical: No cervical adenopathy.   Neurological:      Mental Status: He is alert and oriented to person, place, and " time.   Psychiatric:         Behavior: Behavior normal.           Assessment & Plan   Diagnoses and all orders for this visit:    1. Throat pain (Primary)  -     POCT rapid strep A  -     POCT SARS-CoV-2 Antigen JOSE ALEJANDRO    2. Gastroesophageal reflux disease, unspecified whether esophagitis present    3. Snoring  -     Ambulatory Referral to Sleep Medicine    4. Obstructive sleep apnea, adult    Continue diet and exercise.  Lose weight.  Continue Allegra, add Flonase or Nasonex.  Add Pepcid AC to Prilosec.  Patient has appointment with ENT next month he will keep that.  Most likely it could be from reflux.  Return if no better.  I will see him back at his regular appointment.  Refer for sleep apnea evaluation.  Over-the-counter medication for left ear wax  EHR dragon/transcription disclaimer:  Part of this note are created by electronic transcription/translation of spoken language to printed text and thus may lead to erroneous, or at times, nonsensical words or phrases inadvertently transcribed.  Although I have reviewed for such errors, some may still exist.

## 2024-07-18 ENCOUNTER — OFFICE VISIT (OUTPATIENT)
Dept: GASTROENTEROLOGY | Facility: CLINIC | Age: 49
End: 2024-07-18
Payer: COMMERCIAL

## 2024-07-18 VITALS
HEIGHT: 70 IN | DIASTOLIC BLOOD PRESSURE: 80 MMHG | WEIGHT: 248.8 LBS | BODY MASS INDEX: 35.62 KG/M2 | SYSTOLIC BLOOD PRESSURE: 124 MMHG

## 2024-07-18 DIAGNOSIS — K51.90 ULCERATIVE COLITIS WITHOUT COMPLICATIONS, UNSPECIFIED LOCATION: Primary | ICD-10-CM

## 2024-07-18 DIAGNOSIS — Z91.89 AT RISK FOR LOSS OF BONE DENSITY: ICD-10-CM

## 2024-07-18 DIAGNOSIS — K21.9 GASTROESOPHAGEAL REFLUX DISEASE, UNSPECIFIED WHETHER ESOPHAGITIS PRESENT: ICD-10-CM

## 2024-07-18 RX ORDER — MESALAMINE 0.38 G/1
1.5 CAPSULE, EXTENDED RELEASE ORAL DAILY
Qty: 360 CAPSULE | Refills: 3 | Status: SHIPPED | OUTPATIENT
Start: 2024-07-18

## 2024-07-18 NOTE — PROGRESS NOTES
PATIENT INFORMATION  Arun Painting       - 1975    CHIEF COMPLAINT  Chief Complaint   Patient presents with    Ulcerative Colitis    Constipation       HISTORY OF PRESENT ILLNESS    Here today for UC follow-up    UC diagnosed 20 yrs ago. Has not been seen since , during LOV was well maintained on 2 apriso a day, with 2-3 BM in AM. Reviewed fiber and probiotic.     TODAY: Still taking 2 apriso every day, bowels depend on the day, 4+ BM each day, stools are always loose, not runny, no bleeding or cramping. Has had a little more incomplete evacuation feeling, if drinks fiber it helps. Taking fiber PRN, encouraged to take regularly. More solid stools now than in past. Taking 2 pills in the morning.    3/15/2023 Colon 0 adenomas, no active UC    GERD: Managed on 40 mg omeprazole daily. PCP referring to allergist. No HB, indigestion. Neck was feeling swollen, was starting to improve with allergy medicine. Previous EGD yrs ago.      REVIEWED PERTINENT RESULTS/ LABS  Lab Results   Component Value Date    CASEREPORT  03/15/2023     Surgical Pathology Report                         Case: [id number]                                  Authorizing Provider:  Michael Sinclair        Collected:           03/15/2023 02:03 PM                                 MD Phil                                                                   Ordering Location:     [facility]   Received:            03/15/2023 05:58 PM                                 OR                                                                           Pathologist:           Lillian Patel MD                                                          Specimens:   1) - Large Intestine, Right / Ascending Colon, right colon biopsy                                   2) - Large Intestine, Rectum, rectal biopsy                                                         3) - Large Intestine, Transverse Colon, transverse colon biopsy                                      4) - Large Intestine, Sigmoid Colon, sigmoid colon biopsy                                  FINALDX  03/15/2023     1. Right Colon, Biopsy:  A. Chronic colitis without activity.   B. No histologic evidence of microscopic colitis.   C. Negative for granulomas, viral cytopathic effect, and dysplasia.    2. Rectum, Biopsy:   A. Chronic proctitis without activity.    B. No histologic evidence of microscopic colitis.   C. Negative for granulomas, viral cytopathic effect, and dysplasia.    3. Transverse Colon, Biopsy:  A. Chronic colitis without activity.   B. No histologic evidence of microscopic colitis.   C. Negative for granulomas, viral cytopathic effect, and dysplasia.    4. Sigmoid Colon, Biopsy:  A. Chronic colitis without activity.   B. No histologic evidence of microscopic colitis.   C. Negative for granulomas, viral cytopathic effect, and dysplasia.       Lab Results   Component Value Date    HGB 13.4 04/17/2024    MCV 87.9 04/17/2024     04/17/2024    ALT 21 04/17/2024    AST 16 04/17/2024    TRIG 152 (H) 04/17/2024      No results found.    REVIEW OF SYSTEMS  Review of Systems   Constitutional: Negative.    HENT: Negative.     Eyes: Negative.    Respiratory: Negative.     Cardiovascular: Negative.    Gastrointestinal:  Positive for constipation. Negative for abdominal pain, diarrhea, nausea and vomiting.        UC   Endocrine: Negative.    Genitourinary: Negative.    Musculoskeletal: Negative.    Skin: Negative.    Allergic/Immunologic: Negative.    Neurological: Negative.    Hematological: Negative.    Psychiatric/Behavioral: Negative.           ACTIVE PROBLEMS  Patient Active Problem List    Diagnosis     GERD (gastroesophageal reflux disease) [K21.9]     Hiatal hernia [K44.9]     Idiopathic chronic gout of right foot without tophus [M1A.0710]     Family history of polyps in the colon [Z83.719]     Hypertension [I10]     Atheroma, skin [L72.0]     Mixed hyperlipidemia [E78.2]      Ulcerative colitis without complications [K51.90]          PAST MEDICAL HISTORY  Past Medical History:   Diagnosis Date    Arthritis     right shoulder    Esophagitis     Gastritis     GERD (gastroesophageal reflux disease)     Gout     Hiatal hernia     Hypertension     Pancreatitis     Ulcerative colitis          SURGICAL HISTORY  Past Surgical History:   Procedure Laterality Date    COLONOSCOPY  12/18/2013    stool, mild inflammation, mild-moderate chronic colitis w/crypt abscess     COLONOSCOPY N/A 10/11/2017    Procedure: COLONOSCOPY  w/ biopsies; polypectomy;  Surgeon: Arlyn Cam MD;  Location: [facility] OR;  Service:     COLONOSCOPY N/A 10/30/2019    Procedure: COLONOSCOPY with biopsies;  Surgeon: Arlyn Cam MD;  Location: [facility] OR;  Service: Gastroenterology    COLONOSCOPY N/A 3/15/2023    Procedure: COLONOSCOPY WITH BIOPSY;  Surgeon: Michael Sinclair MD;  Location: [facility] OR;  Service: Gastroenterology;  Laterality: N/A;  ulcerative colitis  biopsies:   right colon  transverse colon  sigmoid colon  rectal colon    PILONIDAL CYST / SINUS EXCISION N/A     recurring     Sinus excision done twice   now coming back    UPPER GASTROINTESTINAL ENDOSCOPY  08/07/2013    z line irregular, 38 cm from incisors, HH, gastritis, active esophagitis w/changes suggestive of an adjacent erosion, no h-pylorino Curarn's         FAMILY HISTORY  Family History   Problem Relation Age of Onset    Colon polyps Mother     Diabetes Father     Colon cancer Neg Hx          SOCIAL HISTORY  Social History     Occupational History    Not on file   Tobacco Use    Smoking status: Never     Passive exposure: Never    Smokeless tobacco: Never    Tobacco comments:     Tried a couple of cigs as a youngster   No habit    Vaping Use    Vaping status: Never Used   Substance and Sexual Activity    Alcohol use: Yes     Alcohol/week: 6.0 standard drinks of alcohol     Types: 6 Cans of beer per week     Comment: weekly    Drug use: No  "   Sexual activity: Defer         CURRENT MEDICATIONS    Current Outpatient Medications:     allopurinol (Zyloprim) 100 MG tablet, Take 2 tablets by mouth Daily., Disp: 180 tablet, Rfl: 3    amLODIPine (NORVASC) 5 MG tablet, Take 1 tablet by mouth Daily., Disp: 90 tablet, Rfl: 5    baclofen (LIORESAL) 10 MG tablet, Take 1 tablet by mouth At Night As Needed for Muscle Spasms., Disp: 15 tablet, Rfl: 0    colchicine 0.6 MG tablet, Take 1 tablet by mouth 2 (Two) Times a Day., Disp: 20 tablet, Rfl: 0    losartan-hydrochlorothiazide (Hyzaar) 100-12.5 MG per tablet, Take 1 tablet by mouth Daily., Disp: 90 tablet, Rfl: 2    meloxicam (MOBIC) 15 MG tablet, Take 1 tablet by mouth Daily As Needed for Mild Pain., Disp: , Rfl:     mesalamine (APRISO) 0.375 g 24 hr capsule, Take 4 capsules by mouth Daily., Disp: 360 capsule, Rfl: 3    omeprazole (priLOSEC) 40 MG capsule, Take 1 capsule by mouth Daily., Disp: 90 capsule, Rfl: 1    ALLERGIES  Asacol [mesalamine]    VITALS  Vitals:    07/18/24 1306   BP: 124/80   BP Location: Left arm   Patient Position: Sitting   Cuff Size: Large Adult   Weight: 113 kg (248 lb 12.8 oz)   Height: 177.8 cm (70\")       PHYSICAL EXAM  Debilities/Disabilities Identified: None  Emotional Behavior: Appropriate  Wt Readings from Last 3 Encounters:   07/18/24 113 kg (248 lb 12.8 oz)   06/26/24 110 kg (243 lb)   04/17/24 112 kg (246 lb)     Ht Readings from Last 1 Encounters:   07/18/24 177.8 cm (70\")     Body mass index is 35.7 kg/m².  Physical Exam  Constitutional:       General: He is not in acute distress.     Appearance: Normal appearance. He is not ill-appearing.   HENT:      Head: Normocephalic and atraumatic.      Mouth/Throat:      Mouth: Mucous membranes are moist.      Pharynx: No posterior oropharyngeal erythema.   Eyes:      General: No scleral icterus.  Cardiovascular:      Rate and Rhythm: Normal rate and regular rhythm.      Heart sounds: Normal heart sounds.   Pulmonary:      Effort: " Pulmonary effort is normal.      Breath sounds: Normal breath sounds.   Abdominal:      General: Abdomen is flat. Bowel sounds are normal. There is no distension.      Palpations: Abdomen is soft. There is no mass.      Tenderness: There is no abdominal tenderness. There is no guarding or rebound. Negative signs include Shaikh's sign.      Hernia: No hernia is present.   Musculoskeletal:      Cervical back: Neck supple.   Skin:     General: Skin is warm.      Capillary Refill: Capillary refill takes less than 2 seconds.   Neurological:      General: No focal deficit present.      Mental Status: He is alert and oriented to person, place, and time.   Psychiatric:         Mood and Affect: Mood normal.         Behavior: Behavior normal.         Thought Content: Thought content normal.         Judgment: Judgment normal.         CLINICAL DATA REVIEWED   reviewed previous lab results and integrated with today's visit, reviewed notes from other physicians and/or last GI encounter, reviewed previous endoscopy results and available photos, reviewed surgical pathology results from previous biopsies    ASSESSMENT  Diagnoses and all orders for this visit:    Ulcerative colitis without complications, unspecified location  -     dexa bone density axial; Future  -     mesalamine (APRISO) 0.375 g 24 hr capsule; Take 4 capsules by mouth Daily.  -     Calprotectin, Fecal - Stool, Per Rectum    At risk for loss of bone density  -     dexa bone density axial; Future    Gastroesophageal reflux disease, unspecified whether esophagitis present          PLAN    Fecal Marino  Continue apriso  Bone density scan  Next colon due 2026    Return in about 6 months (around 1/18/2025).    I have discussed the above plan with the patient.  They verbalize understanding and are in agreement with the plan.  They have been advised to contact the office for any questions, concerns, or changes related to their health.

## 2024-08-07 ENCOUNTER — OFFICE VISIT (OUTPATIENT)
Dept: SLEEP MEDICINE | Facility: HOSPITAL | Age: 49
End: 2024-08-07
Payer: COMMERCIAL

## 2024-08-07 VITALS
OXYGEN SATURATION: 94 % | BODY MASS INDEX: 36.08 KG/M2 | HEIGHT: 70 IN | DIASTOLIC BLOOD PRESSURE: 72 MMHG | SYSTOLIC BLOOD PRESSURE: 116 MMHG | WEIGHT: 252 LBS | HEART RATE: 88 BPM

## 2024-08-07 DIAGNOSIS — E66.9 OBESITY (BMI 30-39.9): ICD-10-CM

## 2024-08-07 DIAGNOSIS — R06.83 SNORING: Primary | ICD-10-CM

## 2024-08-07 DIAGNOSIS — G47.9 SLEEP DISORDER, UNSPECIFIED: ICD-10-CM

## 2024-08-07 PROCEDURE — G0463 HOSPITAL OUTPT CLINIC VISIT: HCPCS

## 2024-08-07 NOTE — PROGRESS NOTES
[facility]  Telephone: [phone] / Fax: [phone] [place]  Telephone: [phone] / Fax: [phone] Aaliyah Oh    Referring Physician: Karen Jaffe MD  PCP: Karen Jaffe MD    Reason for consult:  sleep apnea    Arun Painting is a 49 y.o.male  was seen in the [facility] today for evaluation of sleep apnea. He reports loud snoring since approximately he was 5 years old. Snoring occurs in all positions except prone. He does not wake up choking or gasping for breath. He reports sore throat when he wakes up in the morning. He goes to bed at 5-6pm and up at 1-2am. He works 3rd shift. Wife reports witnessed apneas in the past 20 years. 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 He is due to renew it in November 2024. He does not feel sleepy during the day or while driving.    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     ROS-+neck pain, +SOA, rest is negative.    Arun Painting  has a past medical history of Arthritis, Esophagitis, Gastritis, GERD (gastroesophageal reflux disease), Gout, Hiatal hernia, Hypertension, Pancreatitis, and Ulcerative colitis.    Current Medications:    Current Outpatient Medications:     allopurinol (Zyloprim) 100 MG tablet, Take 2 tablets by mouth Daily., Disp: 180 tablet, Rfl: 3    amLODIPine (NORVASC) 5 MG tablet, Take 1 tablet by mouth Daily., Disp: 90 tablet, Rfl: 5    baclofen (LIORESAL) 10 MG tablet, Take 1 tablet by mouth At Night As Needed for Muscle Spasms., Disp: 15 tablet, Rfl: 0    colchicine 0.6 MG tablet, Take 1 tablet by mouth 2 (Two) Times a Day., Disp: 20 tablet, Rfl: 0    losartan-hydrochlorothiazide (Hyzaar) 100-12.5 MG per tablet, Take 1 tablet by mouth Daily., Disp: 90 tablet, Rfl: 2    meloxicam (MOBIC) 15 MG tablet, Take 1 tablet by mouth Daily As Needed for Mild Pain., Disp: , Rfl:     mesalamine (APRISO) 0.375 g 24 hr capsule, Take 4 capsules by mouth  "Daily., Disp: 360 capsule, Rfl: 3    omeprazole (priLOSEC) 40 MG capsule, Take 1 capsule by mouth Daily., Disp: 90 capsule, Rfl: 1    I have reviewed Past Medical History, Past Surgical History, Medication List, Social History and Family History as entered in Sleep Questionnaire and EPIC.    ESS  7   Vital Signs /72   Pulse 88   Ht 177.8 cm (70\")   Wt 114 kg (252 lb)   SpO2 94%   BMI 36.16 kg/m²  Body mass index is 36.16 kg/m².    General Alert and oriented. No acute distress noted   Pharynx/Throat Class  IV Mallampati airway, large tongue, no evidence of redundant lateral pharyngeal tissue. No oral lesions. No thrush. Moist mucous membranes.   Head Normocephalic. Symmetrical. Atraumatic.    Nose No septal deviation. No drainage   Chest Wall Normal shape. Symmetric expansion with respiration. No tenderness.   Neck Trachea midline, no thyromegaly or adenopathy    Lungs Clear to auscultation bilaterally. No wheezes. No rhonchi. No rales. Respirations regular, even and unlabored.   Heart Regular rhythm and normal rate. Normal S1 and S2. No murmur   Abdomen Soft, non-tender and non-distended. Normal bowel sounds. No masses.   Extremities Moves all extremities well. No edema   Psychiatric Normal mood and affect.        Impression:  1. Snoring    2. Sleep disorder, unspecified    3. Obesity (BMI 30-39.9)                                 Plan:  I discussed the pathophysiology of obstructive sleep apnea with the patient.  We discussed the adverse outcomes associated with untreated sleep-disordered breathing.      We discussed treatment modalities of obstructive sleep apnea including CPAP device, oral appliance and inspire.    Sleep study will be scheduled to establish a definitive diagnosis of sleep disorder breathing.      Weight loss will be strongly beneficial in order to reduce the severity of sleep-disordered breathing.        I appreciate the opportunity to participate in this patient's care.      Airam " MUSTAPHA Jose  [facility]  Phone: [phone]      Part of this note may be an electronic transcription/translation of spoken language to printed text using the Dragon Dictation System. Some errors may exist even though the document was edited.

## 2024-08-21 ENCOUNTER — OFFICE VISIT (OUTPATIENT)
Dept: FAMILY MEDICINE CLINIC | Facility: CLINIC | Age: 49
End: 2024-08-21
Payer: COMMERCIAL

## 2024-08-21 VITALS
HEART RATE: 91 BPM | SYSTOLIC BLOOD PRESSURE: 118 MMHG | TEMPERATURE: 98 F | WEIGHT: 247 LBS | DIASTOLIC BLOOD PRESSURE: 92 MMHG | BODY MASS INDEX: 35.36 KG/M2 | RESPIRATION RATE: 20 BRPM | HEIGHT: 70 IN | OXYGEN SATURATION: 94 %

## 2024-08-21 DIAGNOSIS — K21.9 GASTROESOPHAGEAL REFLUX DISEASE, UNSPECIFIED WHETHER ESOPHAGITIS PRESENT: ICD-10-CM

## 2024-08-21 DIAGNOSIS — I10 PRIMARY HYPERTENSION: Primary | ICD-10-CM

## 2024-08-21 DIAGNOSIS — Z12.5 PROSTATE CANCER SCREENING: ICD-10-CM

## 2024-08-21 DIAGNOSIS — E78.2 MIXED HYPERLIPIDEMIA: ICD-10-CM

## 2024-08-21 DIAGNOSIS — M1A.0710 IDIOPATHIC CHRONIC GOUT OF RIGHT FOOT WITHOUT TOPHUS: ICD-10-CM

## 2024-08-21 PROCEDURE — 90715 TDAP VACCINE 7 YRS/> IM: CPT | Performed by: INTERNAL MEDICINE

## 2024-08-21 PROCEDURE — 90471 IMMUNIZATION ADMIN: CPT | Performed by: INTERNAL MEDICINE

## 2024-08-21 PROCEDURE — 36415 COLL VENOUS BLD VENIPUNCTURE: CPT | Performed by: INTERNAL MEDICINE

## 2024-08-21 PROCEDURE — 99214 OFFICE O/P EST MOD 30 MIN: CPT | Performed by: INTERNAL MEDICINE

## 2024-08-21 NOTE — PROGRESS NOTES
Venipuncture Blood Specimen Collection  Venipuncture performed in left arm by Carmela Olmedo MA with good hemostasis. Patient tolerated the procedure well without complications.   08/21/24   Carmela Olmedo MA

## 2024-08-21 NOTE — PROGRESS NOTES
Subjective   Arun Painting is a 49 y.o. male.     Chief Complaint   Patient presents with    Hyperlipidemia    Hypertension   GERD, gout    Hyperlipidemia  Pertinent negatives include no chest pain or shortness of breath.   Hypertension  Pertinent negatives include no blurred vision, chest pain, palpitations or shortness of breath.      Patient here follow-up for hypertension, hyperlipidemia, GERD, gout.  Has ulcerative colitis followed by Dr. Sinclair.  Reports for 1 week at left upper quadrant and epigastric pain, relates to muscle strain.  Symptoms resolved.  Today has no further pain.  No nausea vomiting fever chills.  The following portions of the patient's history were reviewed and updated as appropriate: allergies, current medications, past family history, past medical history, past social history, past surgical history and problem list.    Review of Systems   Constitutional:  Negative for activity change, appetite change, fatigue and fever.   Eyes:  Negative for blurred vision and double vision.   Respiratory: Negative.  Negative for shortness of breath.    Cardiovascular:  Negative for chest pain, palpitations and leg swelling.   Gastrointestinal:  Positive for abdominal pain.   Neurological:  Negative for dizziness, syncope, light-headedness and headache.       Allergies   Allergen Reactions    Asacol [Mesalamine] Rash       Current Outpatient Medications on File Prior to Visit   Medication Sig Dispense Refill    allopurinol (Zyloprim) 100 MG tablet Take 2 tablets by mouth Daily. 180 tablet 3    amLODIPine (NORVASC) 5 MG tablet Take 1 tablet by mouth Daily. 90 tablet 5    baclofen (LIORESAL) 10 MG tablet Take 1 tablet by mouth At Night As Needed for Muscle Spasms. 15 tablet 0    colchicine 0.6 MG tablet Take 1 tablet by mouth 2 (Two) Times a Day. 20 tablet 0    losartan-hydrochlorothiazide (Hyzaar) 100-12.5 MG per tablet Take 1 tablet by mouth Daily. 90 tablet 2    mesalamine (APRISO) 0.375 g 24 hr  capsule Take 4 capsules by mouth Daily. 360 capsule 3    omeprazole (priLOSEC) 40 MG capsule Take 1 capsule by mouth Daily. 90 capsule 1    [DISCONTINUED] meloxicam (MOBIC) 15 MG tablet Take 1 tablet by mouth Daily As Needed for Mild Pain.       No current facility-administered medications on file prior to visit.       Family History   Problem Relation Age of Onset    Colon polyps Mother     Diabetes Father     Colon cancer Neg Hx        Past Medical History:   Diagnosis Date    Arthritis     right shoulder    Esophagitis     Gastritis     GERD (gastroesophageal reflux disease)     Gout     Hiatal hernia     Hypertension     Pancreatitis     Ulcerative colitis        Past Surgical History:   Procedure Laterality Date    COLONOSCOPY  12/18/2013    stool, mild inflammation, mild-moderate chronic colitis w/crypt abscess     COLONOSCOPY N/A 10/11/2017    Procedure: COLONOSCOPY  w/ biopsies; polypectomy;  Surgeon: Arlyn Cam MD;  Location: [facility] OR;  Service:     COLONOSCOPY N/A 10/30/2019    Procedure: COLONOSCOPY with biopsies;  Surgeon: Arlyn Cam MD;  Location: [facility] OR;  Service: Gastroenterology    COLONOSCOPY N/A 3/15/2023    Procedure: COLONOSCOPY WITH BIOPSY;  Surgeon: Michael Sinclair MD;  Location: [facility] OR;  Service: Gastroenterology;  Laterality: N/A;  ulcerative colitis  biopsies:   right colon  transverse colon  sigmoid colon  rectal colon    PILONIDAL CYST / SINUS EXCISION N/A     recurring     Sinus excision done twice   now coming back    UPPER GASTROINTESTINAL ENDOSCOPY  08/07/2013    z line irregular, 38 cm from incisors, HH, gastritis, active esophagitis w/changes suggestive of an adjacent erosion, no h-pylorino Curran's       Social History     Socioeconomic History    Marital status:    Tobacco Use    Smoking status: Never     Passive exposure: Never    Smokeless tobacco: Never    Tobacco comments:     Tried a couple of cigs as a youngster   No habit    Vaping Use     "Vaping status: Never Used   Substance and Sexual Activity    Alcohol use: Yes     Alcohol/week: 6.0 standard drinks of alcohol     Types: 6 Cans of beer per week     Comment: weekly    Drug use: No    Sexual activity: Defer       Patient Active Problem List   Diagnosis    Ulcerative colitis without complications    Hypertension    Atheroma, skin    Mixed hyperlipidemia    Family history of polyps in the colon    Idiopathic chronic gout of right foot without tophus    GERD (gastroesophageal reflux disease)    Hiatal hernia       /92   Pulse 91   Temp 98 °F (36.7 °C)   Resp 20   Ht 177.8 cm (70\")   Wt 112 kg (247 lb)   SpO2 94%   BMI 35.44 kg/m²   Body mass index is 35.44 kg/m².    Objective   Physical Exam  Vitals and nursing note reviewed.   Constitutional:       Appearance: He is well-developed.   Eyes:      Extraocular Movements: Extraocular movements intact.      Pupils: Pupils are equal, round, and reactive to light.   Neck:      Thyroid: No thyromegaly.      Vascular: No JVD.      Trachea: No tracheal deviation.   Cardiovascular:      Rate and Rhythm: Normal rate and regular rhythm.      Heart sounds: Normal heart sounds. No murmur heard.     No friction rub. No gallop.   Pulmonary:      Effort: Pulmonary effort is normal. No respiratory distress.      Breath sounds: Normal breath sounds. No wheezing.   Abdominal:      General: Bowel sounds are normal. There is no distension.      Palpations: Abdomen is soft. There is no mass.      Tenderness: There is no abdominal tenderness. There is no guarding or rebound.      Hernia: No hernia is present.   Musculoskeletal:         General: Normal range of motion.      Cervical back: Normal range of motion and neck supple.   Lymphadenopathy:      Cervical: No cervical adenopathy.   Neurological:      General: No focal deficit present.      Mental Status: He is alert and oriented to person, place, and time.   Psychiatric:         Behavior: Behavior normal. "           Assessment & Plan   Diagnoses and all orders for this visit:    1. Primary hypertension (Primary)  -     CBC & Differential  -     CK  -     Comprehensive Metabolic Panel  -     Lipid Panel With / Chol / HDL Ratio  -     TSH    2. Mixed hyperlipidemia  -     CBC & Differential  -     CK  -     Comprehensive Metabolic Panel  -     Lipid Panel With / Chol / HDL Ratio  -     TSH  -     Lipase    3. Gastroesophageal reflux disease, unspecified whether esophagitis present  -     CBC & Differential  -     CK  -     Comprehensive Metabolic Panel  -     Lipid Panel With / Chol / HDL Ratio  -     TSH  -     Lipase    4. Idiopathic chronic gout of right foot without tophus  -     CBC & Differential  -     CK  -     Comprehensive Metabolic Panel  -     Lipid Panel With / Chol / HDL Ratio  -     TSH  -     Uric Acid    5. Prostate cancer screening  -     PSA SCREENING    Other orders  -     Tdap Vaccine => 8yo IM (BOOSTRIX/ADACEL)    Continue diet and exercise.  Check blood pressure at home.  Continue taking mesalamine, allopurinol, amlodipine, colchicine, losartan, Prilosec.  Prilosec helping with GERD symptoms.  Discussed with patient if the epigastric, left upper quadrant abdominal pain recurs and she continues to be seen we will proceed with CT of the abdomen pelvis.  Several years ago he had acute pancreatitis.  All his problems are chronic and stable at this time.  Return in 3 months  EHR dragon/transcription disclaimer:  Part of this note are created by electronic transcription/translation of spoken language to printed text and thus may lead to erroneous, or at times, nonsensical words or phrases inadvertently transcribed.  Although I have reviewed for such errors, some may still exist.

## 2024-08-22 LAB
ALBUMIN SERPL-MCNC: 4.3 G/DL (ref 3.5–5.2)
ALBUMIN/GLOB SERPL: 1.3 G/DL
ALP SERPL-CCNC: 122 U/L (ref 39–117)
ALT SERPL-CCNC: 26 U/L (ref 1–41)
AST SERPL-CCNC: 18 U/L (ref 1–40)
BASOPHILS # BLD AUTO: 0.04 10*3/MM3 (ref 0–0.2)
BASOPHILS NFR BLD AUTO: 0.6 % (ref 0–1.5)
BILIRUB SERPL-MCNC: 0.4 MG/DL (ref 0–1.2)
BUN SERPL-MCNC: 11 MG/DL (ref 6–20)
BUN/CREAT SERPL: 14.3 (ref 7–25)
CALCIUM SERPL-MCNC: 9.7 MG/DL (ref 8.6–10.5)
CHLORIDE SERPL-SCNC: 101 MMOL/L (ref 98–107)
CHOLEST SERPL-MCNC: 193 MG/DL (ref 0–200)
CHOLEST/HDLC SERPL: 5.85 {RATIO}
CK SERPL-CCNC: 139 U/L (ref 20–200)
CO2 SERPL-SCNC: 25 MMOL/L (ref 22–29)
CREAT SERPL-MCNC: 0.77 MG/DL (ref 0.76–1.27)
EGFRCR SERPLBLD CKD-EPI 2021: 109.1 ML/MIN/1.73
EOSINOPHIL # BLD AUTO: 0.29 10*3/MM3 (ref 0–0.4)
EOSINOPHIL NFR BLD AUTO: 4 % (ref 0.3–6.2)
ERYTHROCYTE [DISTWIDTH] IN BLOOD BY AUTOMATED COUNT: 12.2 % (ref 12.3–15.4)
GLOBULIN SER CALC-MCNC: 3.2 GM/DL
GLUCOSE SERPL-MCNC: 93 MG/DL (ref 65–99)
HCT VFR BLD AUTO: 40.5 % (ref 37.5–51)
HDLC SERPL-MCNC: 33 MG/DL (ref 40–60)
HGB BLD-MCNC: 13.8 G/DL (ref 13–17.7)
IMM GRANULOCYTES # BLD AUTO: 0.02 10*3/MM3 (ref 0–0.05)
IMM GRANULOCYTES NFR BLD AUTO: 0.3 % (ref 0–0.5)
LDLC SERPL CALC-MCNC: 126 MG/DL (ref 0–100)
LIPASE SERPL-CCNC: 34 U/L (ref 13–60)
LYMPHOCYTES # BLD AUTO: 2.47 10*3/MM3 (ref 0.7–3.1)
LYMPHOCYTES NFR BLD AUTO: 34 % (ref 19.6–45.3)
MCH RBC QN AUTO: 29.5 PG (ref 26.6–33)
MCHC RBC AUTO-ENTMCNC: 34.1 G/DL (ref 31.5–35.7)
MCV RBC AUTO: 86.5 FL (ref 79–97)
MONOCYTES # BLD AUTO: 0.75 10*3/MM3 (ref 0.1–0.9)
MONOCYTES NFR BLD AUTO: 10.3 % (ref 5–12)
NEUTROPHILS # BLD AUTO: 3.69 10*3/MM3 (ref 1.7–7)
NEUTROPHILS NFR BLD AUTO: 50.8 % (ref 42.7–76)
NRBC BLD AUTO-RTO: 0 /100 WBC (ref 0–0.2)
PLATELET # BLD AUTO: 257 10*3/MM3 (ref 140–450)
POTASSIUM SERPL-SCNC: 3.8 MMOL/L (ref 3.5–5.2)
PROT SERPL-MCNC: 7.5 G/DL (ref 6–8.5)
PSA SERPL-MCNC: 0.58 NG/ML (ref 0–4)
RBC # BLD AUTO: 4.68 10*6/MM3 (ref 4.14–5.8)
SODIUM SERPL-SCNC: 138 MMOL/L (ref 136–145)
TRIGL SERPL-MCNC: 191 MG/DL (ref 0–150)
TSH SERPL DL<=0.005 MIU/L-ACNC: 0.86 UIU/ML (ref 0.27–4.2)
URATE SERPL-MCNC: 5.9 MG/DL (ref 3.4–7)
VLDLC SERPL CALC-MCNC: 34 MG/DL (ref 5–40)
WBC # BLD AUTO: 7.26 10*3/MM3 (ref 3.4–10.8)

## 2024-08-26 ENCOUNTER — DOCUMENTATION (OUTPATIENT)
Dept: SLEEP MEDICINE | Facility: HOSPITAL | Age: 49
End: 2024-08-26
Payer: COMMERCIAL

## 2024-08-26 DIAGNOSIS — E66.9 OBESITY (BMI 30-39.9): ICD-10-CM

## 2024-08-26 DIAGNOSIS — G47.9 SLEEP DISORDER, UNSPECIFIED: Primary | ICD-10-CM

## 2024-08-28 ENCOUNTER — OFFICE VISIT (OUTPATIENT)
Dept: SURGERY | Facility: CLINIC | Age: 49
End: 2024-08-28
Payer: COMMERCIAL

## 2024-08-28 VITALS — WEIGHT: 245.7 LBS | HEIGHT: 70 IN | BODY MASS INDEX: 35.18 KG/M2

## 2024-08-28 DIAGNOSIS — L02.215 PERINEAL ABSCESS: Primary | ICD-10-CM

## 2024-08-28 PROCEDURE — 99203 OFFICE O/P NEW LOW 30 MIN: CPT | Performed by: SURGERY

## 2024-08-28 PROCEDURE — 10060 I&D ABSCESS SIMPLE/SINGLE: CPT | Performed by: SURGERY

## 2024-08-28 RX ORDER — MELOXICAM 15 MG/1
15 TABLET ORAL AS NEEDED
COMMUNITY

## 2024-08-28 NOTE — PROGRESS NOTES
Colorectal & General Surgery  History & Physical    Patient: Arun Painting  YOB: 1975  MRN: [id number]      Assessment  Arun Painting is a 49 y.o. male with chronic left groin and perineal abscess cavity concerning for hidradenitis.  It is fluctuant and has overlying erythema concerning for active infection and abscess.  I discussed the role of incision and drainage with possible excision of a cyst cavity or sinus tract.  We discussed the risk, benefits, terms of the procedure.  Informed consent was obtained.    With source control obtained in the office today, no further indication for antibiotics.    Chief Complaint: Left groin abscess    History of Present Illness   Arun Painting is a 49 y.o. male who presents to the office with complaints of a left groin and perineal chronic draining sinus/abscess.  He says been present for 8 months.  It flares up and becomes painful and sometimes develops malodorous drainage.  He has had multiple similar lesions excised in the past.    Past Medical History   Past Medical History:   Diagnosis Date    Arthritis     right shoulder    Esophagitis     Gastritis     GERD (gastroesophageal reflux disease)     Gout     Hiatal hernia     Hypertension     Pancreatitis     Ulcerative colitis         Past Surgical History   Past Surgical History:   Procedure Laterality Date    COLONOSCOPY  12/18/2013    stool, mild inflammation, mild-moderate chronic colitis w/crypt abscess     COLONOSCOPY N/A 10/11/2017    Procedure: COLONOSCOPY  w/ biopsies; polypectomy;  Surgeon: Arlyn Cam MD;  Location: [facility] OR;  Service:     COLONOSCOPY N/A 10/30/2019    Procedure: COLONOSCOPY with biopsies;  Surgeon: Arlyn Cam MD;  Location: [facility] OR;  Service: Gastroenterology    COLONOSCOPY N/A 3/15/2023    Procedure: COLONOSCOPY WITH BIOPSY;  Surgeon: Michael Sinclair MD;  Location: [facility] OR;  Service: Gastroenterology;  Laterality: N/A;  ulcerative  colitis  biopsies:   right colon  transverse colon  sigmoid colon  rectal colon    PILONIDAL CYST / SINUS EXCISION N/A     recurring     Sinus excision done twice   now coming back    UPPER GASTROINTESTINAL ENDOSCOPY  08/07/2013    z line irregular, 38 cm from incisors, HH, gastritis, active esophagitis w/changes suggestive of an adjacent erosion, no h-pylorino Curran's       Social History  Social History     Socioeconomic History    Marital status:    Tobacco Use    Smoking status: Never     Passive exposure: Never    Smokeless tobacco: Never    Tobacco comments:     Tried a couple of cigs as a youngster   No habit    Vaping Use    Vaping status: Never Used   Substance and Sexual Activity    Alcohol use: Yes     Alcohol/week: 6.0 standard drinks of alcohol     Types: 6 Cans of beer per week     Comment: weekly    Drug use: No    Sexual activity: Defer       Family History  Family History   Problem Relation Age of Onset    Colon polyps Mother     Diabetes Father     Colon cancer Neg Hx        Colorectal cancer family history: Polyps in his mother    Review of Systems  Negative except as documented in the HPI.     Allergies  Allergies   Allergen Reactions    Asacol [Mesalamine] Rash       Medications    Current Outpatient Medications:     allopurinol (Zyloprim) 100 MG tablet, Take 2 tablets by mouth Daily., Disp: 180 tablet, Rfl: 3    amLODIPine (NORVASC) 5 MG tablet, Take 1 tablet by mouth Daily., Disp: 90 tablet, Rfl: 5    APPLE CIDER VINEGAR PO, Take 1 capsule by mouth As Needed., Disp: , Rfl:     baclofen (LIORESAL) 10 MG tablet, Take 1 tablet by mouth At Night As Needed for Muscle Spasms., Disp: 15 tablet, Rfl: 0    colchicine 0.6 MG tablet, Take 1 tablet by mouth 2 (Two) Times a Day., Disp: 20 tablet, Rfl: 0    losartan-hydrochlorothiazide (Hyzaar) 100-12.5 MG per tablet, Take 1 tablet by mouth Daily., Disp: 90 tablet, Rfl: 2    melatonin 5 MG tablet tablet, Take 1 tablet by mouth At Night As Needed.,  Disp: , Rfl:     meloxicam (MOBIC) 15 MG tablet, Take 1 tablet by mouth As Needed., Disp: , Rfl:     mesalamine (APRISO) 0.375 g 24 hr capsule, Take 4 capsules by mouth Daily., Disp: 360 capsule, Rfl: 3    omeprazole (priLOSEC) 40 MG capsule, Take 1 capsule by mouth Daily., Disp: 90 capsule, Rfl: 1    Vital Signs  There were no vitals filed for this visit.     Physical Exam  Constitutional: Resting comfortably, no acute distress  Neck: Supple, trachea midline  Respiratory: No increased work of breathing, Symmetric excursion  Cardiovascular: Well pefursed, no jugular venous distention evident   Abdominal:  Soft, non-tender, non-distended  Lymphatics: No cervical or suprascapular adenopathy  Skin: Warm, dry, left groin/perineal abscess cavity concerning for hidradenitis, 1 cm.  Musculoskeletal: Symmetric strength, no obvious gross abnormalities  Psychiatric: Alert and oriented ×3, normal affect      Laboratory Results  I have personally reviewed CBC with WBC 7, hemoglobin 13, platelets 257.  Lipase 34.  CMP with creatinine 0.77, albumin 4.3.    Radiology  None to review    Endoscopy  None to review      ____________________________________________________________    PROCEDURE NOTE  Patient: Arun Painting  YOB: 1975  MRN: [id number]  DATE OF PROCEDURE: 08/28/24     PREOPERATIVE DIAGNOSIS:  Left perineal abscess    POSTOPERATIVE DIAGNOSIS:  Same    PROCEDURE:  Incision and drainage of abscess of left groin, simple    FINDINGS:  2 x 1 cm abscess cavity appeared relatively chronic.  Minimal purulent drainage.  Packed with gauze.    SURGEON:  Nayan Manley MD    ANESTHESIA:  Local anesthetic    EBL:  5 mL    SPECIMEN:  Wound culture    OPERATIVE DESCRIPTION:  The patient was brought to the procedure room and positioned appropriately. The patient was prepped and draped in the usual sterile fashion. Timeout was performed.     Local anesthetic was infiltrated.  Stab incision was made overlying the entire  bullous area.  Minimal amount of purulent drainage was expressed.  Abscess cavity was debrided.  Packed with Nu Gauze and Band-Aid placed over top.    The patient tolerated the procedure well/          Nayan Manley MD  Colorectal & General Surgery  [facility]    [address], [address]  [place], [place], [address]  P: [phone]  F: [phone]

## 2024-09-04 RX ORDER — LOSARTAN POTASSIUM AND HYDROCHLOROTHIAZIDE 12.5; 1 MG/1; MG/1
1 TABLET ORAL DAILY
Qty: 90 TABLET | Refills: 2 | Status: SHIPPED | OUTPATIENT
Start: 2024-09-04

## 2024-11-13 ENCOUNTER — OFFICE VISIT (OUTPATIENT)
Dept: FAMILY MEDICINE CLINIC | Facility: CLINIC | Age: 49
End: 2024-11-13
Payer: COMMERCIAL

## 2024-11-13 VITALS
DIASTOLIC BLOOD PRESSURE: 92 MMHG | HEIGHT: 70 IN | WEIGHT: 251 LBS | BODY MASS INDEX: 35.93 KG/M2 | HEART RATE: 82 BPM | TEMPERATURE: 97.5 F | SYSTOLIC BLOOD PRESSURE: 128 MMHG | OXYGEN SATURATION: 97 % | RESPIRATION RATE: 18 BRPM

## 2024-11-13 DIAGNOSIS — I10 PRIMARY HYPERTENSION: Primary | ICD-10-CM

## 2024-11-13 DIAGNOSIS — E78.2 MIXED HYPERLIPIDEMIA: ICD-10-CM

## 2024-11-13 DIAGNOSIS — K21.9 GASTROESOPHAGEAL REFLUX DISEASE, UNSPECIFIED WHETHER ESOPHAGITIS PRESENT: ICD-10-CM

## 2024-11-13 DIAGNOSIS — M1A.0710 IDIOPATHIC CHRONIC GOUT OF RIGHT FOOT WITHOUT TOPHUS: ICD-10-CM

## 2024-11-13 PROCEDURE — 36415 COLL VENOUS BLD VENIPUNCTURE: CPT | Performed by: INTERNAL MEDICINE

## 2024-11-13 PROCEDURE — 99214 OFFICE O/P EST MOD 30 MIN: CPT | Performed by: INTERNAL MEDICINE

## 2024-11-13 NOTE — PROGRESS NOTES
Subjective   Arun Painting is a 49 y.o. male.     Chief Complaint   Patient presents with    Hypertension     3 month check up    Hyperlipidemia, GERD, gout.    Hypertension  Pertinent negatives include no blurred vision, chest pain, palpitations or shortness of breath.      Patient seen follow-up for hypertension, hyperlipidemia, gout, GERD.  No chest pain shortness of breath.  No nausea vomiting.  Taking current medication.  Taking blood pressure at home reports has been okay.  Is little high here.  Patient has gained weight.  The following portions of the patient's history were reviewed and updated as appropriate: allergies, current medications, past family history, past medical history, past social history, past surgical history and problem list.    Review of Systems   Constitutional:  Negative for activity change, appetite change, fatigue and fever.   Eyes:  Negative for blurred vision and double vision.   Respiratory: Negative.  Negative for shortness of breath.    Cardiovascular:  Negative for chest pain, palpitations and leg swelling.   Gastrointestinal: Negative.    Neurological:  Negative for dizziness, syncope, light-headedness and headache.       Allergies   Allergen Reactions    Asacol [Mesalamine] Rash       Current Outpatient Medications on File Prior to Visit   Medication Sig Dispense Refill    allopurinol (Zyloprim) 100 MG tablet Take 2 tablets by mouth Daily. 180 tablet 3    amLODIPine (NORVASC) 5 MG tablet Take 1 tablet by mouth Daily. 90 tablet 5    APPLE CIDER VINEGAR PO Take 1 capsule by mouth As Needed.      baclofen (LIORESAL) 10 MG tablet Take 1 tablet by mouth At Night As Needed for Muscle Spasms. 15 tablet 0    losartan-hydrochlorothiazide (Hyzaar) 100-12.5 MG per tablet Take 1 tablet by mouth Daily. 90 tablet 2    melatonin 5 MG tablet tablet Take 1 tablet by mouth At Night As Needed.      mesalamine (APRISO) 0.375 g 24 hr capsule Take 4 capsules by mouth Daily. 360 capsule 3     omeprazole (priLOSEC) 40 MG capsule Take 1 capsule by mouth Daily. 90 capsule 1    [DISCONTINUED] meloxicam (MOBIC) 15 MG tablet Take 1 tablet by mouth As Needed.      colchicine 0.6 MG tablet Take 1 tablet by mouth 2 (Two) Times a Day. (Patient not taking: Reported on 11/13/2024) 20 tablet 0     No current facility-administered medications on file prior to visit.       Family History   Problem Relation Age of Onset    Colon polyps Mother     Diabetes Father     Colon cancer Neg Hx        Past Medical History:   Diagnosis Date    Arthritis     right shoulder    Esophagitis     Gastritis     GERD (gastroesophageal reflux disease)     Gout     Hiatal hernia     Hypertension     Nasal polyps     Pancreatitis     Ulcerative colitis        Past Surgical History:   Procedure Laterality Date    COLONOSCOPY  12/18/2013    stool, mild inflammation, mild-moderate chronic colitis w/crypt abscess     COLONOSCOPY N/A 10/11/2017    Procedure: COLONOSCOPY  w/ biopsies; polypectomy;  Surgeon: Arlyn Cam MD;  Location: [facility] OR;  Service:     COLONOSCOPY N/A 10/30/2019    Procedure: COLONOSCOPY with biopsies;  Surgeon: Arlyn Cam MD;  Location: [facility] OR;  Service: Gastroenterology    COLONOSCOPY N/A 3/15/2023    Procedure: COLONOSCOPY WITH BIOPSY;  Surgeon: Michael Sinclair MD;  Location: [facility] OR;  Service: Gastroenterology;  Laterality: N/A;  ulcerative colitis  biopsies:   right colon  transverse colon  sigmoid colon  rectal colon    PILONIDAL CYST / SINUS EXCISION N/A     recurring     Sinus excision done twice   now coming back    UPPER GASTROINTESTINAL ENDOSCOPY  08/07/2013    z line irregular, 38 cm from incisors, HH, gastritis, active esophagitis w/changes suggestive of an adjacent erosion, no h-pylorino Curran's       Social History     Socioeconomic History    Marital status:    Tobacco Use    Smoking status: Never     Passive exposure: Never    Smokeless tobacco: Never    Tobacco comments:  "    Tried a couple of cigs as a youngster   No habit    Vaping Use    Vaping status: Never Used   Substance and Sexual Activity    Alcohol use: Yes     Alcohol/week: 6.0 standard drinks of alcohol     Types: 6 Cans of beer per week     Comment: weekly    Drug use: No    Sexual activity: Defer       Patient Active Problem List   Diagnosis    Ulcerative colitis without complications    Hypertension    Atheroma, skin    Mixed hyperlipidemia    Family history of polyps in the colon    Idiopathic chronic gout of right foot without tophus    GERD (gastroesophageal reflux disease)    Hiatal hernia       /92 (BP Location: Left arm, Patient Position: Sitting, Cuff Size: Adult)   Pulse 82   Temp 97.5 °F (36.4 °C) (Temporal)   Resp 18   Ht 177.8 cm (70\")   Wt 114 kg (251 lb)   SpO2 97%   BMI 36.01 kg/m²   Body mass index is 36.01 kg/m².    Objective   Physical Exam  Vitals and nursing note reviewed.   Constitutional:       Appearance: He is well-developed.   Eyes:      Pupils: Pupils are equal, round, and reactive to light.   Neck:      Thyroid: No thyromegaly.      Vascular: No JVD.      Trachea: No tracheal deviation.   Cardiovascular:      Rate and Rhythm: Normal rate and regular rhythm.      Heart sounds: Normal heart sounds. No murmur heard.     No friction rub. No gallop.   Pulmonary:      Effort: Pulmonary effort is normal. No respiratory distress.      Breath sounds: Normal breath sounds. No wheezing.   Abdominal:      General: Bowel sounds are normal. There is no distension.      Palpations: Abdomen is soft. There is no mass.      Tenderness: There is no abdominal tenderness. There is no guarding or rebound.      Hernia: No hernia is present.   Musculoskeletal:         General: Normal range of motion.      Cervical back: Normal range of motion and neck supple.   Lymphadenopathy:      Cervical: No cervical adenopathy.   Neurological:      Mental Status: He is alert and oriented to person, place, and time. "   Psychiatric:         Behavior: Behavior normal.           Assessment & Plan   Diagnoses and all orders for this visit:    1. Primary hypertension (Primary)  -     Comprehensive Metabolic Panel  -     Lipid Panel With / Chol / HDL Ratio  -     TSH  -     Uric Acid    2. Mixed hyperlipidemia  -     Comprehensive Metabolic Panel  -     Lipid Panel With / Chol / HDL Ratio  -     TSH  -     Uric Acid    3. Idiopathic chronic gout of right foot without tophus  -     Comprehensive Metabolic Panel  -     Lipid Panel With / Chol / HDL Ratio  -     TSH  -     Uric Acid    4. Gastroesophageal reflux disease, unspecified whether esophagitis present    Continue diet and exercise.  Check blood pressure.  Blood pressure high today.  Continue taking Hyzaar and Norvasc.  Continue Prilosec just helping his GERD symptoms.  Continue allopurinol.  All his problems are chronic and stable.  Lose weight.  I will see him back in 3 months time.  Keep follow-up with specialist.  Patient getting allergy testing done.  Patient aware risk for sleep apnea.  Per patient he has been snoring since he was a kid.  I will see him back in 3 months time.  EHR dragon/transcription disclaimer:  Part of this note are created by electronic transcription/translation of spoken language to printed text and thus may lead to erroneous, or at times, nonsensical words or phrases inadvertently transcribed.  Although I have reviewed for such errors, some may still exist.

## 2024-11-13 NOTE — PROGRESS NOTES
Venipuncture Blood Specimen Collection  Venipuncture performed in left arm by Carmela Olmedo MA with good hemostasis. Patient tolerated the procedure well without complications.   11/13/24   Carmela Olmedo MA

## 2024-11-14 LAB
ALBUMIN SERPL-MCNC: 4.4 G/DL (ref 4.1–5.1)
ALP SERPL-CCNC: 108 IU/L (ref 44–121)
ALT SERPL-CCNC: 27 IU/L (ref 0–44)
AST SERPL-CCNC: 26 IU/L (ref 0–40)
BILIRUB SERPL-MCNC: 0.7 MG/DL (ref 0–1.2)
BUN SERPL-MCNC: 9 MG/DL (ref 6–24)
BUN/CREAT SERPL: 9 (ref 9–20)
CALCIUM SERPL-MCNC: 9.5 MG/DL (ref 8.7–10.2)
CHLORIDE SERPL-SCNC: 102 MMOL/L (ref 96–106)
CHOLEST SERPL-MCNC: 204 MG/DL (ref 100–199)
CHOLEST/HDLC SERPL: 5.8 RATIO (ref 0–5)
CO2 SERPL-SCNC: 20 MMOL/L (ref 20–29)
CREAT SERPL-MCNC: 1 MG/DL (ref 0.76–1.27)
EGFRCR SERPLBLD CKD-EPI 2021: 92 ML/MIN/1.73
GLOBULIN SER CALC-MCNC: 3 G/DL (ref 1.5–4.5)
GLUCOSE SERPL-MCNC: 110 MG/DL (ref 70–99)
HDLC SERPL-MCNC: 35 MG/DL
LDLC SERPL CALC-MCNC: 128 MG/DL (ref 0–99)
POTASSIUM SERPL-SCNC: 3.4 MMOL/L (ref 3.5–5.2)
PROT SERPL-MCNC: 7.4 G/DL (ref 6–8.5)
SODIUM SERPL-SCNC: 139 MMOL/L (ref 134–144)
TRIGL SERPL-MCNC: 228 MG/DL (ref 0–149)
TSH SERPL DL<=0.005 MIU/L-ACNC: 0.91 UIU/ML (ref 0.45–4.5)
URATE SERPL-MCNC: 7 MG/DL (ref 3.8–8.4)
VLDLC SERPL CALC-MCNC: 41 MG/DL (ref 5–40)

## 2024-12-18 RX ORDER — AMLODIPINE BESYLATE 5 MG/1
5 TABLET ORAL DAILY
Qty: 90 TABLET | Refills: 5 | Status: SHIPPED | OUTPATIENT
Start: 2024-12-18

## 2025-01-02 RX ORDER — OMEPRAZOLE 40 MG/1
40 CAPSULE, DELAYED RELEASE ORAL DAILY
Qty: 90 CAPSULE | Refills: 3 | Status: SHIPPED | OUTPATIENT
Start: 2025-01-02

## 2025-01-20 RX ORDER — ALLOPURINOL 100 MG/1
200 TABLET ORAL DAILY
Qty: 180 TABLET | Refills: 3 | Status: SHIPPED | OUTPATIENT
Start: 2025-01-20

## 2025-01-20 NOTE — TELEPHONE ENCOUNTER
Rx Refill Note  Requested Prescriptions      No prescriptions requested or ordered in this encounter      Last office visit with prescribing clinician: 11/13/2024   Last telemedicine visit with prescribing clinician: Visit date not found   Next office visit with prescribing clinician: 2/19/2025

## 2025-02-05 ENCOUNTER — TELEPHONE (OUTPATIENT)
Dept: FAMILY MEDICINE CLINIC | Facility: CLINIC | Age: 50
End: 2025-02-05
Payer: COMMERCIAL

## 2025-03-12 ENCOUNTER — HOSPITAL ENCOUNTER (OUTPATIENT)
Dept: GENERAL RADIOLOGY | Facility: HOSPITAL | Age: 50
Discharge: HOME | End: 2025-03-12
Admitting: INTERNAL MEDICINE
Payer: COMMERCIAL

## 2025-03-12 ENCOUNTER — OFFICE VISIT (OUTPATIENT)
Dept: FAMILY MEDICINE CLINIC | Facility: CLINIC | Age: 50
End: 2025-03-12
Payer: COMMERCIAL

## 2025-03-12 VITALS
DIASTOLIC BLOOD PRESSURE: 90 MMHG | HEART RATE: 81 BPM | HEIGHT: 70 IN | TEMPERATURE: 97.9 F | OXYGEN SATURATION: 97 % | SYSTOLIC BLOOD PRESSURE: 148 MMHG | WEIGHT: 253.8 LBS | BODY MASS INDEX: 36.33 KG/M2

## 2025-03-12 DIAGNOSIS — J40 BRONCHITIS: ICD-10-CM

## 2025-03-12 DIAGNOSIS — E78.2 MIXED HYPERLIPIDEMIA: ICD-10-CM

## 2025-03-12 DIAGNOSIS — I10 PRIMARY HYPERTENSION: Primary | ICD-10-CM

## 2025-03-12 DIAGNOSIS — M1A.0710 IDIOPATHIC CHRONIC GOUT OF RIGHT FOOT WITHOUT TOPHUS: ICD-10-CM

## 2025-03-12 DIAGNOSIS — K21.9 GASTROESOPHAGEAL REFLUX DISEASE, UNSPECIFIED WHETHER ESOPHAGITIS PRESENT: ICD-10-CM

## 2025-03-12 PROCEDURE — 71046 X-RAY EXAM CHEST 2 VIEWS: CPT

## 2025-03-12 PROCEDURE — 99214 OFFICE O/P EST MOD 30 MIN: CPT | Performed by: INTERNAL MEDICINE

## 2025-03-12 RX ORDER — MULTIPLE VITAMINS W/ MINERALS TAB 9MG-400MCG
1 TAB ORAL DAILY
COMMUNITY

## 2025-03-12 RX ORDER — DEXTROMETHORPHAN HYDROBROMIDE AND PROMETHAZINE HYDROCHLORIDE 15; 6.25 MG/5ML; MG/5ML
5 SYRUP ORAL NIGHTLY PRN
Qty: 200 ML | Refills: 0 | Status: SHIPPED | OUTPATIENT
Start: 2025-03-12

## 2025-03-12 RX ORDER — ALBUTEROL SULFATE 90 UG/1
2 INHALANT RESPIRATORY (INHALATION) EVERY 4 HOURS PRN
Qty: 90 G | Refills: 0 | Status: SHIPPED | OUTPATIENT
Start: 2025-03-12

## 2025-03-12 RX ORDER — AZITHROMYCIN 250 MG/1
TABLET, FILM COATED ORAL
Qty: 6 TABLET | Refills: 0 | Status: SHIPPED | OUTPATIENT
Start: 2025-03-12

## 2025-03-12 NOTE — PROGRESS NOTES
Subjective   Arun Painting is a 49 y.o. male.     Chief Complaint   Patient presents with    Hypertension     Follow up   Gout, hyperlipidemia, GERD, cough    Hypertension  Pertinent negatives include no blurred vision, chest pain, palpitations or shortness of breath.      Patient here follow-up for hypertension, gout, hyperlipidemia, GERD, cough.  He has ulcerative colitis followed by Dr. Sinclair.  No chest pain shortness of breath.  Complains of cough for 3 weeks, is productive.  No fever or chills or shortness of breath.  No chest pain.  Wife was also sick with similar symptom is better now.  Taking all current medications.    The following portions of the patient's history were reviewed and updated as appropriate: allergies, current medications, past family history, past medical history, past social history, past surgical history and problem list.    Review of Systems   Constitutional:  Negative for activity change, appetite change, fatigue and fever.   Eyes:  Negative for blurred vision and double vision.   Respiratory: Negative.  Positive for cough. Negative for apnea, choking, chest tightness, shortness of breath, wheezing and stridor.    Cardiovascular:  Negative for chest pain, palpitations and leg swelling.   Gastrointestinal: Negative.    Neurological:  Negative for dizziness, syncope, light-headedness and headache.       Allergies   Allergen Reactions    Asacol [Mesalamine] Rash       Current Outpatient Medications on File Prior to Visit   Medication Sig Dispense Refill    allopurinol (Zyloprim) 100 MG tablet Take 2 tablets by mouth Daily. 180 tablet 3    amLODIPine (NORVASC) 5 MG tablet Take 1 tablet by mouth Daily. 90 tablet 5    APPLE CIDER VINEGAR PO Take 1 capsule by mouth As Needed.      baclofen (LIORESAL) 10 MG tablet Take 1 tablet by mouth At Night As Needed for Muscle Spasms. 15 tablet 0    colchicine 0.6 MG tablet Take 1 tablet by mouth 2 (Two) Times a Day. 20 tablet 0     losartan-hydrochlorothiazide (Hyzaar) 100-12.5 MG per tablet Take 1 tablet by mouth Daily. 90 tablet 2    melatonin 5 MG tablet tablet Take 1 tablet by mouth At Night As Needed.      mesalamine (APRISO) 0.375 g 24 hr capsule Take 4 capsules by mouth Daily. 360 capsule 3    multivitamin with minerals tablet tablet Take 1 tablet by mouth Daily.      omeprazole (priLOSEC) 40 MG capsule Take 1 capsule by mouth Daily. 90 capsule 3     No current facility-administered medications on file prior to visit.       Family History   Problem Relation Age of Onset    Colon polyps Mother     Diabetes Father     Colon cancer Neg Hx        Past Medical History:   Diagnosis Date    Arthritis     right shoulder    Esophagitis     Gastritis     GERD (gastroesophageal reflux disease)     Gout     Hiatal hernia     Hypertension     Nasal polyps     Pancreatitis     Ulcerative colitis        Past Surgical History:   Procedure Laterality Date    COLONOSCOPY  12/18/2013    stool, mild inflammation, mild-moderate chronic colitis w/crypt abscess     COLONOSCOPY N/A 10/11/2017    Procedure: COLONOSCOPY  w/ biopsies; polypectomy;  Surgeon: Arlyn Cam MD;  Location: [facility] OR;  Service:     COLONOSCOPY N/A 10/30/2019    Procedure: COLONOSCOPY with biopsies;  Surgeon: Arlyn Cam MD;  Location: [facility] OR;  Service: Gastroenterology    COLONOSCOPY N/A 3/15/2023    Procedure: COLONOSCOPY WITH BIOPSY;  Surgeon: Michael Sinclair MD;  Location: [facility] OR;  Service: Gastroenterology;  Laterality: N/A;  ulcerative colitis  biopsies:   right colon  transverse colon  sigmoid colon  rectal colon    PILONIDAL CYST / SINUS EXCISION N/A     recurring     Sinus excision done twice   now coming back    UPPER GASTROINTESTINAL ENDOSCOPY  08/07/2013    z line irregular, 38 cm from incisors, HH, gastritis, active esophagitis w/changes suggestive of an adjacent erosion, no h-pylorino Curran's       Social History     Socioeconomic History     "Marital status:    Tobacco Use    Smoking status: Never     Passive exposure: Never    Smokeless tobacco: Never    Tobacco comments:     Tried a couple of cigs as a youngster   No habit    Vaping Use    Vaping status: Never Used   Substance and Sexual Activity    Alcohol use: Yes     Alcohol/week: 6.0 standard drinks of alcohol     Types: 6 Cans of beer per week     Comment: weekly    Drug use: No    Sexual activity: Defer       Patient Active Problem List   Diagnosis    Ulcerative colitis without complications    Hypertension    Atheroma, skin    Mixed hyperlipidemia    Family history of polyps in the colon    Idiopathic chronic gout of right foot without tophus    GERD (gastroesophageal reflux disease)    Hiatal hernia       /90 (BP Location: Left arm, Patient Position: Sitting, Cuff Size: Large Adult)   Pulse 81   Temp 97.9 °F (36.6 °C) (Oral)   Ht 177.8 cm (70\")   Wt 115 kg (253 lb 12.8 oz)   SpO2 97%   BMI 36.42 kg/m²   Body mass index is 36.42 kg/m².    Objective   Physical Exam  Vitals and nursing note reviewed.   Constitutional:       Appearance: He is well-developed.   Eyes:      Pupils: Pupils are equal, round, and reactive to light.   Neck:      Thyroid: No thyromegaly.      Vascular: No JVD.      Trachea: No tracheal deviation.   Cardiovascular:      Rate and Rhythm: Normal rate and regular rhythm.      Heart sounds: Normal heart sounds. No murmur heard.     No friction rub. No gallop.   Pulmonary:      Effort: Pulmonary effort is normal. No respiratory distress.      Breath sounds: No stridor. Wheezing present. No rhonchi.   Abdominal:      General: Bowel sounds are normal. There is no distension.      Palpations: Abdomen is soft. There is no mass.      Tenderness: There is no abdominal tenderness. There is no guarding or rebound.      Hernia: No hernia is present.   Musculoskeletal:         General: Normal range of motion.      Cervical back: Normal range of motion and neck supple. "   Lymphadenopathy:      Cervical: No cervical adenopathy.   Neurological:      Mental Status: He is alert and oriented to person, place, and time.   Psychiatric:         Behavior: Behavior normal.           Assessment & Plan   Diagnoses and all orders for this visit:    1. Primary hypertension (Primary)  -     CBC & Differential  -     Comprehensive Metabolic Panel  -     Lipid Panel With / Chol / HDL Ratio  -     Uric acid    2. Mixed hyperlipidemia  -     CBC & Differential  -     Comprehensive Metabolic Panel  -     Lipid Panel With / Chol / HDL Ratio  -     Uric acid    3. Idiopathic chronic gout of right foot without tophus  -     CBC & Differential  -     Comprehensive Metabolic Panel  -     Lipid Panel With / Chol / HDL Ratio  -     Uric acid    4. Gastroesophageal reflux disease, unspecified whether esophagitis present  -     CBC & Differential  -     Comprehensive Metabolic Panel  -     Lipid Panel With / Chol / HDL Ratio  -     Uric acid    5. Bronchitis  -     XR Chest 2 View; Future  -     CBC & Differential  -     Comprehensive Metabolic Panel  -     Lipid Panel With / Chol / HDL Ratio  -     Uric acid    Other orders  -     azithromycin (ZITHROMAX) 250 MG tablet; Take 2 tablets the first day, then 1 tablet daily for 4 days.  Dispense: 6 tablet; Refill: 0  -     albuterol sulfate  (90 Base) MCG/ACT inhaler; Inhale 2 puffs Every 4 (Four) Hours As Needed for Wheezing.  Dispense: 90 g; Refill: 0  -     promethazine-dextromethorphan (PROMETHAZINE-DM) 6.25-15 MG/5ML syrup; Take 5 mL by mouth At Night As Needed for Cough.  Dispense: 200 mL; Refill: 0    Continue diet and exercise.  Check blood pressure at home.  Continue misalignment, allopurinol, amlodipine, colchicine, Hyzaar, Prilosec.  Prilosec is helping with GERD symptoms.  Patient does not want steroids as make him hyper.  Z-Bryce given.  Chest x-ray ordered to rule out pneumonia.  I will see him back in 3 months time.  Patient to come back if no  better.  EHR dragon/transcription disclaimer:  Part of this note may be an electronic transcription/translation of spoken language to printed text using the Dragon Dictation System.

## 2025-03-14 ENCOUNTER — CLINICAL SUPPORT (OUTPATIENT)
Dept: FAMILY MEDICINE CLINIC | Facility: CLINIC | Age: 50
End: 2025-03-14
Payer: COMMERCIAL

## 2025-03-14 DIAGNOSIS — E78.2 MIXED HYPERLIPIDEMIA: Primary | ICD-10-CM

## 2025-03-14 DIAGNOSIS — I10 PRIMARY HYPERTENSION: ICD-10-CM

## 2025-03-14 LAB
ALBUMIN SERPL-MCNC: 4.2 G/DL (ref 3.5–5.2)
ALBUMIN/GLOB SERPL: 1.3 G/DL
ALP SERPL-CCNC: 101 U/L (ref 39–117)
ALT SERPL-CCNC: 23 U/L (ref 1–41)
AST SERPL-CCNC: 21 U/L (ref 1–40)
BASOPHILS # BLD AUTO: 0.03 10*3/MM3 (ref 0–0.2)
BASOPHILS NFR BLD AUTO: 0.4 % (ref 0–1.5)
BILIRUB SERPL-MCNC: 0.6 MG/DL (ref 0–1.2)
BUN SERPL-MCNC: 8 MG/DL (ref 6–20)
BUN/CREAT SERPL: 9.4 (ref 7–25)
CALCIUM SERPL-MCNC: 10 MG/DL (ref 8.6–10.5)
CHLORIDE SERPL-SCNC: 103 MMOL/L (ref 98–107)
CHOLEST SERPL-MCNC: 197 MG/DL (ref 0–200)
CHOLEST/HDLC SERPL: 6.16 {RATIO}
CO2 SERPL-SCNC: 24.9 MMOL/L (ref 22–29)
CREAT SERPL-MCNC: 0.85 MG/DL (ref 0.76–1.27)
EGFRCR SERPLBLD CKD-EPI 2021: 105.9 ML/MIN/1.73
EOSINOPHIL # BLD AUTO: 0.14 10*3/MM3 (ref 0–0.4)
EOSINOPHIL NFR BLD AUTO: 1.9 % (ref 0.3–6.2)
ERYTHROCYTE [DISTWIDTH] IN BLOOD BY AUTOMATED COUNT: 13 % (ref 12.3–15.4)
GLOBULIN SER CALC-MCNC: 3.2 GM/DL
GLUCOSE SERPL-MCNC: 84 MG/DL (ref 65–99)
HCT VFR BLD AUTO: 41.9 % (ref 37.5–51)
HDLC SERPL-MCNC: 32 MG/DL (ref 40–60)
HGB BLD-MCNC: 13.5 G/DL (ref 13–17.7)
IMM GRANULOCYTES # BLD AUTO: 0.02 10*3/MM3 (ref 0–0.05)
IMM GRANULOCYTES NFR BLD AUTO: 0.3 % (ref 0–0.5)
LDLC SERPL CALC-MCNC: 124 MG/DL (ref 0–100)
LYMPHOCYTES # BLD AUTO: 2.53 10*3/MM3 (ref 0.7–3.1)
LYMPHOCYTES NFR BLD AUTO: 34.5 % (ref 19.6–45.3)
MCH RBC QN AUTO: 29.3 PG (ref 26.6–33)
MCHC RBC AUTO-ENTMCNC: 32.2 G/DL (ref 31.5–35.7)
MCV RBC AUTO: 91.1 FL (ref 79–97)
MONOCYTES # BLD AUTO: 0.53 10*3/MM3 (ref 0.1–0.9)
MONOCYTES NFR BLD AUTO: 7.2 % (ref 5–12)
NEUTROPHILS # BLD AUTO: 4.09 10*3/MM3 (ref 1.7–7)
NEUTROPHILS NFR BLD AUTO: 55.7 % (ref 42.7–76)
NRBC BLD AUTO-RTO: 0 /100 WBC (ref 0–0.2)
PLATELET # BLD AUTO: 258 10*3/MM3 (ref 140–450)
POTASSIUM SERPL-SCNC: 3.7 MMOL/L (ref 3.5–5.2)
PROT SERPL-MCNC: 7.4 G/DL (ref 6–8.5)
RBC # BLD AUTO: 4.6 10*6/MM3 (ref 4.14–5.8)
SODIUM SERPL-SCNC: 139 MMOL/L (ref 136–145)
TRIGL SERPL-MCNC: 232 MG/DL (ref 0–150)
VLDLC SERPL CALC-MCNC: 41 MG/DL (ref 5–40)
WBC # BLD AUTO: 7.34 10*3/MM3 (ref 3.4–10.8)

## 2025-03-14 PROCEDURE — 36415 COLL VENOUS BLD VENIPUNCTURE: CPT | Performed by: INTERNAL MEDICINE

## 2025-03-17 NOTE — PROGRESS NOTES
Venipuncture Blood Specimen Collection  Venipuncture performed in left arm by Carmela Olmedo MA with good hemostasis. Patient tolerated the procedure well without complications.   3/14/2025   Carmela Olmedo MA

## 2025-05-21 ENCOUNTER — OFFICE VISIT (OUTPATIENT)
Dept: FAMILY MEDICINE CLINIC | Facility: CLINIC | Age: 50
End: 2025-05-21
Payer: COMMERCIAL

## 2025-05-21 VITALS
HEIGHT: 70 IN | SYSTOLIC BLOOD PRESSURE: 132 MMHG | WEIGHT: 246 LBS | TEMPERATURE: 97.3 F | BODY MASS INDEX: 35.22 KG/M2 | HEART RATE: 63 BPM | RESPIRATION RATE: 16 BRPM | OXYGEN SATURATION: 97 % | DIASTOLIC BLOOD PRESSURE: 88 MMHG

## 2025-05-21 DIAGNOSIS — K51.90 ULCERATIVE COLITIS WITHOUT COMPLICATIONS, UNSPECIFIED LOCATION: ICD-10-CM

## 2025-05-21 DIAGNOSIS — M1A.0710 IDIOPATHIC CHRONIC GOUT OF RIGHT FOOT WITHOUT TOPHUS: ICD-10-CM

## 2025-05-21 DIAGNOSIS — E78.2 MIXED HYPERLIPIDEMIA: ICD-10-CM

## 2025-05-21 DIAGNOSIS — I10 PRIMARY HYPERTENSION: Primary | ICD-10-CM

## 2025-05-21 DIAGNOSIS — K21.9 GASTROESOPHAGEAL REFLUX DISEASE, UNSPECIFIED WHETHER ESOPHAGITIS PRESENT: ICD-10-CM

## 2025-05-21 PROCEDURE — 99214 OFFICE O/P EST MOD 30 MIN: CPT | Performed by: INTERNAL MEDICINE

## 2025-05-21 RX ORDER — ALLOPURINOL 100 MG/1
200 TABLET ORAL DAILY
Qty: 180 TABLET | Refills: 3 | Status: SHIPPED | OUTPATIENT
Start: 2025-05-21

## 2025-05-21 RX ORDER — LOSARTAN POTASSIUM AND HYDROCHLOROTHIAZIDE 12.5; 1 MG/1; MG/1
1 TABLET ORAL DAILY
Qty: 90 TABLET | Refills: 3 | Status: SHIPPED | OUTPATIENT
Start: 2025-05-21

## 2025-05-21 RX ORDER — AMLODIPINE BESYLATE 5 MG/1
5 TABLET ORAL DAILY
Qty: 90 TABLET | Refills: 5 | Status: SHIPPED | OUTPATIENT
Start: 2025-05-21

## 2025-05-21 RX ORDER — OMEPRAZOLE 40 MG/1
40 CAPSULE, DELAYED RELEASE ORAL DAILY
Qty: 90 CAPSULE | Refills: 3 | Status: SHIPPED | OUTPATIENT
Start: 2025-05-21

## 2025-05-21 NOTE — PROGRESS NOTES
Subjective   Arun Painting is a 49 y.o. male.     Chief Complaint   Patient presents with    Hypertension     Check up    Ulcerative colitis, gout, hyperlipidemia    Hypertension  Associated symptoms: no blurred vision, no chest pain, no palpitations, no shortness of breath and no dizziness       Patient seen follow-up for hypertension, hyperlipidemia, ulcerative colitis, gout.  History of Present Illness  The patient presents for evaluation of blood pressure, gout, and right ear discomfort.  Right ear felt full yesterday but nothing since then.  No hearing loss.    He reports a general sense of well-being, with the exception of some ear discomfort. He has been engaging in physical activities such as yard work, which have contributed to his weight loss. He is on amlodipine 5 mg and Hyzaar for blood pressure management.    His gout condition has remained stable without any exacerbations. He is on allopurinol 200 mg and takes colchicine as needed.    He has been experiencing significant allergy symptoms and uses an inhaler intermittently for relief.    He reports some ear discomfort and swelling, which he suspects may be due to wax accumulation.    He is on Prilosec for stomach issues.    He is on mesalamine for his condition. He had a colonoscopy in 2023 and is scheduled for another one in 2026. He is under the care of a specialist, whom he sees annually.    He was supposed to get a bone density scan but has not done it yet.    The following portions of the patient's history were reviewed and updated as appropriate: allergies, current medications, past family history, past medical history, past social history, past surgical history and problem list.    Review of Systems   Constitutional:  Negative for activity change, appetite change, fatigue and fever.   Eyes:  Negative for blurred vision and double vision.   Respiratory: Negative.  Negative for shortness of breath.    Cardiovascular:  Negative for chest pain,  palpitations and leg swelling.   Gastrointestinal: Negative.    Neurological:  Negative for dizziness, syncope, light-headedness and headache.       Allergies   Allergen Reactions    Asacol [Mesalamine] Rash       Current Outpatient Medications on File Prior to Visit   Medication Sig Dispense Refill    albuterol sulfate  (90 Base) MCG/ACT inhaler Inhale 2 puffs Every 4 (Four) Hours As Needed for Wheezing. 90 g 0    APPLE CIDER VINEGAR PO Take 1 capsule by mouth As Needed.      melatonin 5 MG tablet tablet Take 1 tablet by mouth At Night As Needed.      mesalamine (APRISO) 0.375 g 24 hr capsule Take 4 capsules by mouth Daily. 360 capsule 3    multivitamin with minerals tablet tablet Take 1 tablet by mouth Daily.      [DISCONTINUED] allopurinol (Zyloprim) 100 MG tablet Take 2 tablets by mouth Daily. 180 tablet 3    [DISCONTINUED] amLODIPine (NORVASC) 5 MG tablet Take 1 tablet by mouth Daily. 90 tablet 5    [DISCONTINUED] baclofen (LIORESAL) 10 MG tablet Take 1 tablet by mouth At Night As Needed for Muscle Spasms. 15 tablet 0    [DISCONTINUED] losartan-hydrochlorothiazide (Hyzaar) 100-12.5 MG per tablet Take 1 tablet by mouth Daily. 90 tablet 2    [DISCONTINUED] omeprazole (priLOSEC) 40 MG capsule Take 1 capsule by mouth Daily. 90 capsule 3    colchicine 0.6 MG tablet Take 1 tablet by mouth 2 (Two) Times a Day. (Patient not taking: Reported on 5/21/2025) 20 tablet 0    [DISCONTINUED] azithromycin (ZITHROMAX) 250 MG tablet Take 2 tablets the first day, then 1 tablet daily for 4 days. 6 tablet 0    [DISCONTINUED] promethazine-dextromethorphan (PROMETHAZINE-DM) 6.25-15 MG/5ML syrup Take 5 mL by mouth At Night As Needed for Cough. 200 mL 0     No current facility-administered medications on file prior to visit.       Family History   Problem Relation Age of Onset    Colon polyps Mother     Diabetes Mother     Diabetes Father     Colon cancer Neg Hx        Past Medical History:   Diagnosis Date    Arthritis     right  shoulder    Esophagitis     Gastritis     GERD (gastroesophageal reflux disease)     Gout     Hiatal hernia     Hypertension     Nasal polyps     Pancreatitis     Peptic ulceration 2010    Ulcerative colitis        Past Surgical History:   Procedure Laterality Date    COLONOSCOPY  12/18/2013    stool, mild inflammation, mild-moderate chronic colitis w/crypt abscess     COLONOSCOPY N/A 10/11/2017    Procedure: COLONOSCOPY  w/ biopsies; polypectomy;  Surgeon: Arlyn Cam MD;  Location: [facility] OR;  Service:     COLONOSCOPY N/A 10/30/2019    Procedure: COLONOSCOPY with biopsies;  Surgeon: Arlyn Cam MD;  Location: [facility] OR;  Service: Gastroenterology    COLONOSCOPY N/A 3/15/2023    Procedure: COLONOSCOPY WITH BIOPSY;  Surgeon: Michael Sinclair MD;  Location: [facility] OR;  Service: Gastroenterology;  Laterality: N/A;  ulcerative colitis  biopsies:   right colon  transverse colon  sigmoid colon  rectal colon    PILONIDAL CYST / SINUS EXCISION N/A     recurring     Sinus excision done twice   now coming back    UPPER GASTROINTESTINAL ENDOSCOPY  08/07/2013    z line irregular, 38 cm from incisors, HH, gastritis, active esophagitis w/changes suggestive of an adjacent erosion, no h-pylorino Curran's       Social History     Socioeconomic History    Marital status:    Tobacco Use    Smoking status: Never     Passive exposure: Never    Smokeless tobacco: Never    Tobacco comments:     Tried a couple of cigs as a youngster   No habit    Vaping Use    Vaping status: Never Used   Substance and Sexual Activity    Alcohol use: Yes     Alcohol/week: 6.0 standard drinks of alcohol     Types: 6 Cans of beer per week     Comment: weekly    Drug use: No    Sexual activity: Defer       Patient Active Problem List   Diagnosis    Ulcerative colitis without complications    Hypertension    Atheroma, skin    Mixed hyperlipidemia    Family history of polyps in the colon    Idiopathic chronic gout of right foot without  "tophus    GERD (gastroesophageal reflux disease)    Hiatal hernia       /88 (BP Location: Left arm, Patient Position: Sitting, Cuff Size: Adult)   Pulse 63   Temp 97.3 °F (36.3 °C) (Temporal)   Resp 16   Ht 177.8 cm (70\")   Wt 112 kg (246 lb)   SpO2 97%   BMI 35.30 kg/m²   Body mass index is 35.3 kg/m².    Objective   Physical Exam  Vitals and nursing note reviewed.   Constitutional:       Appearance: He is well-developed.   HENT:      Right Ear: Tympanic membrane, ear canal and external ear normal. There is no impacted cerumen.      Left Ear: Tympanic membrane, ear canal and external ear normal. There is no impacted cerumen.   Eyes:      Extraocular Movements: Extraocular movements intact.      Pupils: Pupils are equal, round, and reactive to light.   Neck:      Thyroid: No thyromegaly.      Vascular: No JVD.      Trachea: No tracheal deviation.   Cardiovascular:      Rate and Rhythm: Normal rate and regular rhythm.      Heart sounds: Normal heart sounds. No murmur heard.     No friction rub. No gallop.   Pulmonary:      Effort: Pulmonary effort is normal. No respiratory distress.      Breath sounds: Normal breath sounds. No wheezing.   Abdominal:      General: Bowel sounds are normal. There is no distension.      Palpations: Abdomen is soft. There is no mass.      Tenderness: There is no abdominal tenderness. There is no guarding or rebound.      Hernia: No hernia is present.   Musculoskeletal:         General: Normal range of motion.      Cervical back: Normal range of motion and neck supple.   Lymphadenopathy:      Cervical: No cervical adenopathy.   Neurological:      Mental Status: He is alert and oriented to person, place, and time.   Psychiatric:         Behavior: Behavior normal.           Assessment & Plan   Diagnoses and all orders for this visit:    1. Primary hypertension (Primary)  -     CBC & Differential  -     Comprehensive Metabolic Panel  -     Lipid Panel With / Chol / HDL Ratio  -   "   TSH    2. Mixed hyperlipidemia  -     CBC & Differential  -     Comprehensive Metabolic Panel  -     Lipid Panel With / Chol / HDL Ratio  -     TSH    3. Ulcerative colitis without complications, unspecified location  -     CBC & Differential  -     Comprehensive Metabolic Panel  -     Lipid Panel With / Chol / HDL Ratio  -     TSH    4. Gastroesophageal reflux disease, unspecified whether esophagitis present  -     CBC & Differential  -     Comprehensive Metabolic Panel  -     Lipid Panel With / Chol / HDL Ratio  -     TSH    5. Idiopathic chronic gout of right foot without tophus  -     Uric Acid    Other orders  -     allopurinol (Zyloprim) 100 MG tablet; Take 2 tablets by mouth Daily.  Dispense: 180 tablet; Refill: 3  -     amLODIPine (NORVASC) 5 MG tablet; Take 1 tablet by mouth Daily.  Dispense: 90 tablet; Refill: 5  -     losartan-hydrochlorothiazide (Hyzaar) 100-12.5 MG per tablet; Take 1 tablet by mouth Daily.  Dispense: 90 tablet; Refill: 3  -     omeprazole (priLOSEC) 40 MG capsule; Take 1 capsule by mouth Daily.  Dispense: 90 capsule; Refill: 3             Assessment & Plan  1. Blood pressure management.  - Blood pressure readings have shown improvement, although there is still room for further enhancement.  - Currently taking amlodipine 5 mg and Hyzaar.  - Refills for these medications have been sent to the pharmacy for the whole year.  - Monitoring weight loss as part of overall health improvement.    2. Gout.  - Gout condition is currently stable without any flare-ups.  - Taking allopurinol 200 mg and colchicine as needed.  - Refills for allopurinol have been sent to the pharmacy for the whole year.  - Advised to continue monitoring for any signs of flare-ups.    3. Allergies.  - Reports having a lot of issues with allergies.  - Uses an inhaler as needed.  - No signs of infection or redness in the ear upon examination.  - Advised to monitor ear discomfort and report any changes.    4.  Gastroesophageal reflux disease.  - Taking Prilosec for stomach issues.  - Refills for Prilosec have been sent to the pharmacy for the whole year.  - Advised to continue current medication regimen.    5. Ulcerative colitis.  - Taking mesalamine (Apriso) for ulcerative colitis.  - Will continue to follow up with his specialist annually for this condition.  - No signs of flare-ups reported.  - Advised to maintain regular follow-up appointments.    6. Health maintenance.  - Had a colonoscopy in 2023 and is scheduled for another one in 2026.  - Not due for pneumonia or shingles vaccines at this time.  - Advised to undergo fasting blood work either tomorrow or next week.  - Encouraged to maintain regular health screenings and follow-up visits.    Follow-up  - Follow-up appointment scheduled in 3 months.  Patient or patient representative verbalized consent for the use of Ambient Listening during the visit with  Karen Jaffe MD for chart documentation. 5/21/2025  11:59 EDT  Right ear exam was normal.  Could be allergies.  He can observe at this time.  If worse I will see him back.  Continue his current medications.  I will see him back in 3 months time.  EHR dragon/transcription disclaimer:  Part of this note may be an electronic transcription/translation of spoken language to printed text using the Dragon Dictation System.

## 2025-08-05 DIAGNOSIS — E66.9 OBESITY (BMI 30-39.9): ICD-10-CM

## 2025-08-05 DIAGNOSIS — G47.9 SLEEP DISORDER, UNSPECIFIED: Primary | ICD-10-CM

## 2025-08-19 ENCOUNTER — HOSPITAL ENCOUNTER (OUTPATIENT)
Dept: SLEEP MEDICINE | Facility: HOSPITAL | Age: 50
Discharge: HOME | End: 2025-08-19
Admitting: NURSE PRACTITIONER
Payer: COMMERCIAL

## 2025-08-19 DIAGNOSIS — E66.9 OBESITY (BMI 30-39.9): ICD-10-CM

## 2025-08-19 DIAGNOSIS — G47.9 SLEEP DISORDER, UNSPECIFIED: ICD-10-CM

## 2025-08-19 PROCEDURE — 95811 POLYSOM 6/>YRS CPAP 4/> PARM: CPT

## 2025-08-21 DIAGNOSIS — G47.33 OBSTRUCTIVE SLEEP APNEA, ADULT: Primary | ICD-10-CM

## 2025-08-22 ENCOUNTER — TELEPHONE (OUTPATIENT)
Dept: SLEEP MEDICINE | Facility: HOSPITAL | Age: 50
End: 2025-08-22
Payer: COMMERCIAL

## (undated) DEVICE — SYR LL 3CC

## (undated) DEVICE — SOL IRR H2O BTL 1000ML STRL

## (undated) DEVICE — SUCTION CANISTER, 3000CC,SAFELINER: Brand: DEROYAL

## (undated) DEVICE — GLV SURG SENSICARE MICRO PF LF 6 STRL

## (undated) DEVICE — MASK,FACE,FLUID RESIST,SHLD,EARLOOP: Brand: MEDLINE

## (undated) DEVICE — VIAL FORMALIN CAP 10P 40ML

## (undated) DEVICE — FRCP BX RADJAW4 NDL 2.8 240CM LG OG BX40

## (undated) DEVICE — JACKT LAB F/R KNIT CUFF/COLR XLG BLU

## (undated) DEVICE — GLV SURG SENSICARE PI MIC PF SZ7.5 LF STRL

## (undated) DEVICE — JACKT LAB KNIT COLR LG BLU

## (undated) DEVICE — SAFELINER SUCTION CANISTER 1000CC: Brand: DEROYAL

## (undated) DEVICE — GOWN ISOL W/THUMB UNIV BLU BX/15

## (undated) DEVICE — SPNG GZ WOVN 4X4IN 12PLY 10/BX STRL

## (undated) DEVICE — Device

## (undated) DEVICE — BW-412T DISP COMBO CLEANING BRUSH: Brand: SINGLE USE COMBINATION CLEANING BRUSH

## (undated) DEVICE — ADAPT CLN BIOGUARD AIR/H2O DISP

## (undated) DEVICE — Device: Brand: DEFENDO AIR/WATER/SUCTION AND BIOPSY VALVE

## (undated) DEVICE — KT ORCA ORCAPOD DISP STRL

## (undated) DEVICE — MASK,FACE,SHIELD,BLUE,ANTI FOG,TIES: Brand: MEDLINE

## (undated) DEVICE — SYR LUER SLPTP 50ML